# Patient Record
Sex: MALE | Race: WHITE | NOT HISPANIC OR LATINO | Employment: OTHER | URBAN - NONMETROPOLITAN AREA
[De-identification: names, ages, dates, MRNs, and addresses within clinical notes are randomized per-mention and may not be internally consistent; named-entity substitution may affect disease eponyms.]

---

## 2020-02-26 ENCOUNTER — CONSULT (OUTPATIENT)
Dept: ONCOLOGY | Facility: CLINIC | Age: 65
End: 2020-02-26

## 2020-02-26 ENCOUNTER — LAB (OUTPATIENT)
Dept: ONCOLOGY | Facility: HOSPITAL | Age: 65
End: 2020-02-26

## 2020-02-26 VITALS
SYSTOLIC BLOOD PRESSURE: 190 MMHG | TEMPERATURE: 98.8 F | HEIGHT: 72 IN | BODY MASS INDEX: 26.84 KG/M2 | HEART RATE: 105 BPM | WEIGHT: 198.2 LBS | RESPIRATION RATE: 18 BRPM | DIASTOLIC BLOOD PRESSURE: 88 MMHG

## 2020-02-26 DIAGNOSIS — D72.829 LEUKOCYTOSIS, UNSPECIFIED TYPE: ICD-10-CM

## 2020-02-26 DIAGNOSIS — D75.839 THROMBOCYTOSIS: ICD-10-CM

## 2020-02-26 DIAGNOSIS — R76.8 HEPATITIS C ANTIBODY POSITIVE IN BLOOD: ICD-10-CM

## 2020-02-26 DIAGNOSIS — R53.83 OTHER FATIGUE: ICD-10-CM

## 2020-02-26 DIAGNOSIS — D72.828 OTHER ELEVATED WHITE BLOOD CELL (WBC) COUNT: Primary | ICD-10-CM

## 2020-02-26 LAB
ANISOCYTOSIS BLD QL: ABNORMAL
BASOPHILS # BLD MANUAL: 0.18 10*3/MM3 (ref 0–0.2)
BASOPHILS NFR BLD AUTO: 1 % (ref 0–1.5)
CRP SERPL-MCNC: <0.03 MG/DL (ref 0–0.5)
DEPRECATED RDW RBC AUTO: 91.6 FL (ref 37–54)
EOSINOPHIL # BLD MANUAL: 0.53 10*3/MM3 (ref 0–0.4)
EOSINOPHIL NFR BLD MANUAL: 3 % (ref 0.3–6.2)
ERYTHROCYTE [DISTWIDTH] IN BLOOD BY AUTOMATED COUNT: 29.9 % (ref 12.3–15.4)
ERYTHROCYTE [SEDIMENTATION RATE] IN BLOOD: 3 MM/HR (ref 0–15)
FERRITIN SERPL-MCNC: 776 NG/ML (ref 30–400)
HBV SURFACE AB SER RIA-ACNC: NORMAL
HBV SURFACE AG SERPL QL IA: NORMAL
HCT VFR BLD AUTO: 39.7 % (ref 37.5–51)
HCV AB SER DONR QL: REACTIVE
HGB BLD-MCNC: 13.2 G/DL (ref 13–17.7)
HYPOCHROMIA BLD QL: ABNORMAL
IRON 24H UR-MRATE: 84 MCG/DL (ref 59–158)
IRON SATN MFR SERPL: 22 % (ref 20–50)
LDH SERPL-CCNC: 349 U/L (ref 135–225)
LYMPHOCYTES # BLD MANUAL: 1.07 10*3/MM3 (ref 0.7–3.1)
LYMPHOCYTES NFR BLD MANUAL: 2 % (ref 5–12)
LYMPHOCYTES NFR BLD MANUAL: 6 % (ref 19.6–45.3)
MCH RBC QN AUTO: 28.3 PG (ref 26.6–33)
MCHC RBC AUTO-ENTMCNC: 33.2 G/DL (ref 31.5–35.7)
MCV RBC AUTO: 85 FL (ref 79–97)
MONOCYTES # BLD AUTO: 0.36 10*3/MM3 (ref 0.1–0.9)
NEUTROPHILS # BLD AUTO: 15.29 10*3/MM3 (ref 1.7–7)
NEUTROPHILS NFR BLD MANUAL: 81 % (ref 42.7–76)
NEUTS BAND NFR BLD MANUAL: 5 % (ref 0–5)
OVALOCYTES BLD QL SMEAR: ABNORMAL
PLATELET # BLD AUTO: 1547 10*3/MM3 (ref 140–450)
PMV BLD AUTO: 9.8 FL (ref 6–12)
RBC # BLD AUTO: 4.67 10*6/MM3 (ref 4.14–5.8)
SMALL PLATELETS BLD QL SMEAR: ABNORMAL
TARGETS BLD QL SMEAR: ABNORMAL
TIBC SERPL-MCNC: 375 MCG/DL (ref 298–536)
TRANSFERRIN SERPL-MCNC: 252 MG/DL (ref 200–360)
VARIANT LYMPHS NFR BLD MANUAL: 2 % (ref 0–5)
WBC MORPH BLD: NORMAL
WBC NRBC COR # BLD: 17.78 10*3/MM3 (ref 3.4–10.8)

## 2020-02-26 PROCEDURE — 99204 OFFICE O/P NEW MOD 45 MIN: CPT | Performed by: INTERNAL MEDICINE

## 2020-02-26 PROCEDURE — 83615 LACTATE (LD) (LDH) ENZYME: CPT | Performed by: INTERNAL MEDICINE

## 2020-02-26 PROCEDURE — 84466 ASSAY OF TRANSFERRIN: CPT | Performed by: INTERNAL MEDICINE

## 2020-02-26 PROCEDURE — G0463 HOSPITAL OUTPT CLINIC VISIT: HCPCS | Performed by: INTERNAL MEDICINE

## 2020-02-26 PROCEDURE — 86706 HEP B SURFACE ANTIBODY: CPT | Performed by: INTERNAL MEDICINE

## 2020-02-26 PROCEDURE — 86803 HEPATITIS C AB TEST: CPT | Performed by: INTERNAL MEDICINE

## 2020-02-26 PROCEDURE — 86704 HEP B CORE ANTIBODY TOTAL: CPT | Performed by: INTERNAL MEDICINE

## 2020-02-26 PROCEDURE — 83540 ASSAY OF IRON: CPT | Performed by: INTERNAL MEDICINE

## 2020-02-26 PROCEDURE — 85651 RBC SED RATE NONAUTOMATED: CPT | Performed by: INTERNAL MEDICINE

## 2020-02-26 PROCEDURE — 85007 BL SMEAR W/DIFF WBC COUNT: CPT | Performed by: INTERNAL MEDICINE

## 2020-02-26 PROCEDURE — 85025 COMPLETE CBC W/AUTO DIFF WBC: CPT | Performed by: INTERNAL MEDICINE

## 2020-02-26 PROCEDURE — 87340 HEPATITIS B SURFACE AG IA: CPT | Performed by: INTERNAL MEDICINE

## 2020-02-26 PROCEDURE — 86140 C-REACTIVE PROTEIN: CPT | Performed by: INTERNAL MEDICINE

## 2020-02-26 PROCEDURE — G9903 PT SCRN TBCO ID AS NON USER: HCPCS | Performed by: INTERNAL MEDICINE

## 2020-02-26 PROCEDURE — 1123F ACP DISCUSS/DSCN MKR DOCD: CPT | Performed by: INTERNAL MEDICINE

## 2020-02-26 PROCEDURE — 82728 ASSAY OF FERRITIN: CPT | Performed by: INTERNAL MEDICINE

## 2020-02-26 PROCEDURE — G8731 PAIN NEG NO PLAN: HCPCS | Performed by: INTERNAL MEDICINE

## 2020-02-26 PROCEDURE — 87522 HEPATITIS C REVRS TRNSCRPJ: CPT | Performed by: INTERNAL MEDICINE

## 2020-02-26 RX ORDER — DOCUSATE SODIUM 100 MG/1
100 CAPSULE, LIQUID FILLED ORAL 2 TIMES DAILY PRN
COMMUNITY

## 2020-02-26 NOTE — PROGRESS NOTES
DATE OF CONSULT: 2/26/2020      REQUESTING SOURCE:  Keshav St MD       REASON FOR CONSULTATION: Leukocytosis, thrombocytosis, fatigue, hepatitis C antibody positivity      HISTORY OF PRESENT ILLNESS:    64-year-old male with history of hepatitis probably hepatitis B diagnosed around 2000 after colonoscopy, recurrent sinus infection, recent diagnosis of high blood pressure was seen in urgent care center on February 21, 2020 with complaint of fatigue and not feeling well.  Patient was diagnosed with sinusitis and received intravenous antibiotic with Rocephin.  CBC done on February 21, 2020 showed elevated blood cell count of 20,000 with platelet count of  1292,000.  She had a repeat CBC done on February 24, 2020 that showed persistent thrombocytosis with platelet count of 1317,000 and leukocytosis with white blood cell count of 18,000.  Patient has been referred to [facility] [place] for further evaluation and recommendation regarding abnormal CBC.  Patient states he is having on and off night sweats for last 1 year.  Complains of swelling in hand and feet intermittently.  Denies any association of swelling with hot water.  Complains of skin rash affecting back.  Complains of hives affecting bilateral upper extremity which was helped with Benadryl intermittently.  Denies any major weight loss.  Denies any fevers.  Denies any early satiety.        PAST MEDICAL HISTORY:    Past Medical History:   Diagnosis Date   • Hepatitis B    • Hypertension    • Sinus infection        PAST SURGICAL HISTORY:  Past Surgical History:   Procedure Laterality Date   • COLONOSCOPY     • HEMORRHOID BANDING         ALLERGIES:    Allergies   Allergen Reactions   • Other Rash     Pt was working with walnut wood and broke out in a rash   • Tetracycline Rash       SOCIAL HISTORY:   Social History     Tobacco Use   • Smoking status: Former Smoker   • Smokeless tobacco: Never Used   • Tobacco comment: quit in early 21 years old    Substance Use Topics   • Alcohol use: Yes     Alcohol/week: 1.0 standard drinks     Types: 1 Cans of beer per week     Comment: occasionally   • Drug use: Never       CURRENT MEDICATIONS:    Current Outpatient Medications   Medication Sig Dispense Refill   • docusate sodium (COLACE) 100 MG capsule Take 100 mg by mouth 2 (Two) Times a Day.     • NON FORMULARY Daily. kiolic garlic     • NON FORMULARY Daily. reconostat       No current facility-administered medications for this visit.         HOME MEDICATIONS:   Current Outpatient Medications on File Prior to Visit   Medication Sig Dispense Refill   • docusate sodium (COLACE) 100 MG capsule Take 100 mg by mouth 2 (Two) Times a Day.     • NON FORMULARY Daily. kiolic garlic     • NON FORMULARY Daily. reconostat       No current facility-administered medications on file prior to visit.        FAMILY HISTORY:    Family History   Problem Relation Age of Onset   • Diabetes Father    • Cirrhosis Father    • Cancer Sister    • Cancer Maternal Uncle        REVIEW OF SYSTEMS:      CONSTITUTIONAL:  Complains of fatigue.  Complains of swelling of hand but denies any associated with hot water.  Planes of night sweats intermittently that has started 1 year ago.  Denies any fever, chills or weight loss.     EYES: No visual disturbances. No discharge. No new lesions    ENMT:  No epistaxis, mouth sores or difficulty swallowing.    RESPIRATORY:  No new shortness of breath. No new cough or hemoptysis.    CARDIOVASCULAR:  No chest pain or palpitations.    GASTROINTESTINAL:  No abdominal pain nausea, vomiting or blood in the stool.    GENITOURINARY: No Dysuria or Hematuria.    MUSCULOSKELETAL: Complains of arthritis pain affecting elbow and right shoulder.    LYMPHATICS:  Denies any abnormal swollen glands anywhere in the body.    NEUROLOGICAL : No tingling or numbness. No headache or dizziness. No seizures or balance problems.    SKIN: Complains of skin lesion affecting  "back.    ENDOCRINE : No new hot or cold intolerance. No new polyuria . No polydipsia.        PHYSICAL EXAMINATION:      VITAL SIGNS:  BP (!) 190/88 Comment: manually  Pulse 105   Temp 98.8 °F (37.1 °C)   Resp 18   Ht 182.9 cm (72\")   Wt 89.9 kg (198 lb 3.2 oz)   BMI 26.88 kg/m²       02/26/20  0847   Weight: 89.9 kg (198 lb 3.2 oz)       ECOG performance status: 2    CONSTITUTIONAL:  Not in any distress.  Appears anxious.    EYES: Mild conjunctival Pallor. No Icterus. No Pterygium. Extraocular Movements intact.No ptosis.    ENMT:  Normocephalic, Atraumatic.No Facial Asymmetry noted.    NECK:  No adenopathy.Trachea midline. NO JVD.    RESPIRATORY:  Fair air entry bilateral. No rhonchi or wheezing.Fair respiratory effort.    CARDIOVASCULAR:  S1, S2. Regular rate and rhythm. No murmur or gallop appreciated.    ABDOMEN:  Soft, obese, nontender. Bowel sounds present in all four quadrants.  No Hepatosplenomegaly appreciated.    MUSCULOSKELETAL:  No edema.No Calf Tenderness.Decreased range of motion.    NEUROLOGIC:    No  Motor or sensory deficit appreciated. Cranial Nerves 2-12 grossly intact.    SKIN : Skin area measuring about 4 x 3 cm present in lower right back which appears to be dry skin without any evidence of excessive erythema or discharge. Skin is warm and dry to touch.    LYMPHATICS: No new enlarged lymph nodes in neck or supraclavicular area.    PSYCHIATRY: Alert, awake and oriented ×3.  Appears anxious.  Normal judgment.  Makes good eye contact.          DIAGNOSTIC DATA:    No results found for: WBC, RBC, HGB, HCT, MCV, MCH, MCHC, RDW, RDWSD, MPV, PLT, NEUTRORELPCT, LYMPHORELPCT, MONORELPCT, EOSRELPCT, BASORELPCT, AUTOIGPER, NEUTROABS, LYMPHSABS, MONOSABS, EOSABS, BASOSABS, AUTOIGNUM, NRBC  No results found for: GLUCOSE, NA, K, CO2, CL, ANIONGAP, CREATININE, BUN, BCR, CALCIUM, EGFRIFNONA, ALKPHOS, PROTEINTOT, ALT, AST, BILITOT, ALBUMIN, GLOB, LABIL2  No results found for: IRON, TIBC, LABIRON, FERRITIN, " [id number], FOLATE  No results found for: , LABCA2, AFPTM, HCGQUANT, , CHROMGRNA, [id number], CEA, REFLABREPO]      Care Everywhere Result Report  CBC With DifferentialResulted: 2/21/2020 7:56 PM  [facility]  Component Name Value Ref Range   White Blood Cells 20.4 (H) 4.2 - 10.2    Red Blood Cells 4.58 4.1 - 5.7    Hemoglobin 13.1 12.9 - 17.4 g/dL   Hematocrit 40.3 37.6 - 49.5 %   MCV 88.0 80 - 97 fL   MCH 28.6 27 - 33.2 pg   MCHC 32.5 (L) 32.7 - 35.6 g/dL   RDW 30.7 (H) 12.7 - 15.7 %   Platelet Count 1,292 (HH)   Comment: CONFIRMED BY REPEAT GAVE TO MARCIE @ 1940 BY  - 375    Percent Neutrophils 72.7 (H) 51.2 - 72 %   Percent Lymphs 14.7 (L) 21.7 - 43.3 %   Percent Monocytes 5.5 0 - 12 %   Percent Eosinophils 2.4 0 - 5 %   Percent basophil 3.4 (H) 0 - 2 %   IG% - mch 1.3 (H) 0 - 0.2 %   Absolute Neutrophils 14.8 (H) 2 - 7.8    Absolute Lymphocytes 3.0 0.9 - 4.4    Absolute Monocytes 1.1 (H) 0 - 1    Absolute Eosiniphils 0.5 0 - 0.5    Absolute Basophils 0.7 (H) 0 - 0.2    ABS IG 0.27 (H) 0 - 0.2 10*9/L   Segmented Neutrophils 73 (H) 36 - 66 %   Lymphocytes 10 (L) 24 - 44 %   Monocytes 6 2 - 10 %   Eosinophils 2 2 - 4 %   Basophils 2 0 - 2 %   Atypical Lymphocytes 7 0 - 10 %   Nucleated Red Blood Cells 3     RBC MORPH. NORM     Platelet Estimate MOD   Comment:  INCREASED  W/ LARGE AND GIANT PLTS     Specimen Collected on   Whole Blood - Whole blood sample (specimen) 2/21/2020 7:28 PM           Radiology Data :  Chest x-ray PA/lateral done at [facility] on February 22, 2020 showed:  Weakness .    PA and lateral chest: The bones are intact . There is a granuloma in the upper right midlung field . No infiltrate or mass . The heart is upper limits of normal . No heart failure or pleural fluid . No hilar or mediastinal adenopathy          ASSESSMENT AND PLAN:      1.  Thrombocytosis:  - Patient is found to have thrombocytosis with platelet count of 1292,000 on February 21, 2020.  Repeat CBC done  on February 24, 2020 shows platelet count is 1318,000  -Patient did have a sinus infection at that point without any fevers.  -Differential diagnosis for extreme thrombocytosis includes primary bone marrow pathology like myeloproliferative neoplasm or leukemia/lymphoma or CML versus reactive causes like infection or severe iron deficiency.  - We will do blood work today consisting of CBC with differential, iron studies, ferritin, ESR, C-reactive protein, LDH, Kye 2 mutation including reflex to exon 12-15, DEEDEE R and MPL, peripheral blood flow cytometry as well as BCR/ABL.  -We will get an ultrasound of abdomen to look at liver and spleen size and architecture.  - We will ask patient to return to clinic in 1 week after blood work and ultrasound to go over the results and further recommendation.  -Recommend starting aspirin 81 mg p.o. daily to prevent any thrombotic complication in the interim.    2.  Leukocytosis:  - Patient was found to have leukocytosis with white blood cell count staying between 18,000 and 20,000 on February 21, 2020 and February 24, 2020.  -Differential is predominantly neutrophil.  - Again differential remains reactive like infection versus bone marrow pathology.  -We will follow-up on result of above-mentioned blood work.    3.  Fatigue and night sweats:  -Worrisome for underlying bone marrow pathology.    4.  Hypertension:  -Patient's blood pressure is elevated at 190/88.  Patient is very anxious and nervous about appointment today.  Recommend rechecking blood pressure later today and if it remains persistently high to be started on any antihypertensive medication.  Recommend following up with primary medical provider.    5.  Hepatitis C antibody positivity:  -Patient had questionable history of hepatitis B in early 2000.  Blood work done today for hepatitis profile shows positivity for hepatitis C antibody.  -We will send out hepatitis C quantitative RNA PCR for further evaluation of hep C  antibody being positive.    6.  Health maintenance: Patient does not smoke.  Had a colonoscopy around 2001 at [place].    7. Advance Care Planning: For now patient remains full code and is able to make decisions.  Patient has health care surrogate mentioned on chart.      PHQ-9 Total Score:         Marv Messina reports a pain score of 0.  Given his pain assessment as noted, treatment options were discussed and the following options were decided upon as a follow-up plan to address the patient's pain: No intervention required.             Thank you for this consultation.    Vadim Song MD  2/26/2020  10:21 AM          EMR Dragon/Transcription disclaimer:   Much of this encounter note is an electronic transcription/translation of spoken language to printed text. The electronic translation of spoken language may permit erroneous, or at times, nonsensical words or phrases to be inadvertently transcribed; Although I have reviewed the note for such errors, some may still exist.

## 2020-02-27 LAB — HBV CORE AB SER DONR QL IA: NEGATIVE

## 2020-02-28 LAB
HCV RNA SERPL NAA+PROBE-ACNC: NORMAL IU/ML
TEST INFORMATION: NORMAL

## 2020-03-02 LAB
REF LAB TEST METHOD: NORMAL
WHOLE BLOOD SORT: NORMAL

## 2020-03-03 ENCOUNTER — HOSPITAL ENCOUNTER (OUTPATIENT)
Dept: ULTRASOUND IMAGING | Facility: HOSPITAL | Age: 65
Discharge: HOME | End: 2020-03-03
Admitting: INTERNAL MEDICINE

## 2020-03-03 DIAGNOSIS — D72.828 OTHER ELEVATED WHITE BLOOD CELL (WBC) COUNT: ICD-10-CM

## 2020-03-03 DIAGNOSIS — D75.839 THROMBOCYTOSIS: ICD-10-CM

## 2020-03-03 PROCEDURE — 76700 US EXAM ABDOM COMPLETE: CPT

## 2020-03-06 ENCOUNTER — OFFICE VISIT (OUTPATIENT)
Dept: ONCOLOGY | Facility: CLINIC | Age: 65
End: 2020-03-06

## 2020-03-06 VITALS
OXYGEN SATURATION: 97 % | SYSTOLIC BLOOD PRESSURE: 149 MMHG | WEIGHT: 197.2 LBS | HEART RATE: 96 BPM | DIASTOLIC BLOOD PRESSURE: 89 MMHG | BODY MASS INDEX: 26.75 KG/M2 | TEMPERATURE: 98.5 F

## 2020-03-06 DIAGNOSIS — D72.829 LEUKOCYTOSIS, UNSPECIFIED TYPE: ICD-10-CM

## 2020-03-06 DIAGNOSIS — R76.8 HEPATITIS C ANTIBODY POSITIVE IN BLOOD: ICD-10-CM

## 2020-03-06 DIAGNOSIS — D75.839 THROMBOCYTOSIS: Primary | ICD-10-CM

## 2020-03-06 DIAGNOSIS — D47.1 MPN (MYELOPROLIFERATIVE NEOPLASM) (HCC): ICD-10-CM

## 2020-03-06 PROCEDURE — G0463 HOSPITAL OUTPT CLINIC VISIT: HCPCS | Performed by: INTERNAL MEDICINE

## 2020-03-06 PROCEDURE — G8731 PAIN NEG NO PLAN: HCPCS | Performed by: INTERNAL MEDICINE

## 2020-03-06 PROCEDURE — G9903 PT SCRN TBCO ID AS NON USER: HCPCS | Performed by: INTERNAL MEDICINE

## 2020-03-06 PROCEDURE — 99214 OFFICE O/P EST MOD 30 MIN: CPT | Performed by: INTERNAL MEDICINE

## 2020-03-06 PROCEDURE — 1123F ACP DISCUSS/DSCN MKR DOCD: CPT | Performed by: INTERNAL MEDICINE

## 2020-03-06 NOTE — PROGRESS NOTES
DATE OF VISIT: 3/6/2020      REASON FOR VISIT: Kye 2+ myeloproliferative neoplasm, hep C antibody positive, thrombocytosis, leukocytosis      HISTORY OF PRESENT ILLNESS:    64-year-old male with history of hepatitis probably hepatitis B diagnosed around 2000 after colonoscopy, recurrent sinus infection, recent diagnosis of high blood pressure was initially seen in consultation on February 26, 2020 for evaluation of abnormal CBC showing leukocytosis and thrombocytosis.  Patient had a blood work done as well as ultrasound of abdomen.  Patient is here to discuss the results and further recommendation.  States he feels better.  Denies any bleeding.  Denies any new lymph node enlargement.    PAST MEDICAL HISTORY:    Past Medical History:   Diagnosis Date   • Hepatitis B    • Hypertension    • Sinus infection        SOCIAL HISTORY:    Social History     Tobacco Use   • Smoking status: Former Smoker   • Smokeless tobacco: Never Used   • Tobacco comment: quit in early 21 years old   Substance Use Topics   • Alcohol use: Yes     Alcohol/week: 1.0 standard drinks     Types: 1 Cans of beer per week     Comment: occasionally   • Drug use: Never       Surgical History :  Past Surgical History:   Procedure Laterality Date   • COLONOSCOPY     • HEMORRHOID BANDING         ALLERGIES:    Allergies   Allergen Reactions   • Other Rash     Pt was working with walnut wood and broke out in a rash   • Tetracycline Rash         FAMILY HISTORY:  Family History   Problem Relation Age of Onset   • Diabetes Father    • Cirrhosis Father    • Cancer Sister    • Cancer Maternal Uncle            REVIEW OF SYSTEMS:      CONSTITUTIONAL:  Complains of fatigue.  Complains of swelling of hand but denies any associated with hot water.  Complains of night sweats intermittently that has started 1 year ago.  Denies any fever, chills or weight loss.      EYES: No visual disturbances. No discharge. No new lesions     ENMT:  No epistaxis, mouth sores or  difficulty swallowing.     RESPIRATORY:  No new shortness of breath. No new cough or hemoptysis.     CARDIOVASCULAR:  No chest pain or palpitations.     GASTROINTESTINAL:  No abdominal pain nausea, vomiting or blood in the stool.     GENITOURINARY: No Dysuria or Hematuria.     MUSCULOSKELETAL: Complains of arthritis pain affecting elbow and right shoulder.     LYMPHATICS:  Denies any abnormal swollen glands anywhere in the body.     NEUROLOGICAL : No tingling or numbness. No headache or dizziness. No seizures or balance problems.     SKIN: Complains of skin lesion affecting back.     ENDOCRINE : No new hot or cold intolerance. No new polyuria . No polydipsia.          PHYSICAL EXAMINATION:      VITAL SIGNS:  /89   Pulse 96   Temp 98.5 °F (36.9 °C) (Temporal)   Wt 89.4 kg (197 lb 3.2 oz)   SpO2 97%   BMI 26.75 kg/m²       03/06/20  1122   Weight: 89.4 kg (197 lb 3.2 oz)           ECOG performance status: 2     CONSTITUTIONAL:  Not in any distress.  Appears anxious.     EYES: Mild conjunctival Pallor. No Icterus. No Pterygium. Extraocular Movements intact.No ptosis.     ENMT:  Normocephalic, Atraumatic.No Facial Asymmetry noted.     NECK:  No adenopathy.Trachea midline. NO JVD.     RESPIRATORY:  Fair air entry bilateral. No rhonchi or wheezing.Fair respiratory effort.     CARDIOVASCULAR:  S1, S2. Regular rate and rhythm. No murmur or gallop appreciated.     ABDOMEN:  Soft, obese, nontender. Bowel sounds present in all four quadrants.  No Hepatosplenomegaly appreciated.     MUSCULOSKELETAL:  No edema.No Calf Tenderness.Decreased range of motion.     NEUROLOGIC:    No  Motor or sensory deficit appreciated. Cranial Nerves 2-12 grossly intact.     SKIN : Skin area measuring about 4 x 3 cm present in lower right back which appears to be dry skin without any evidence of excessive erythema or discharge. Skin is warm and dry to touch.     LYMPHATICS: No new enlarged lymph nodes in neck or supraclavicular  area.     PSYCHIATRY: Alert, awake and oriented ×3.  Appears anxious.  Normal judgment.  Makes good eye contact.             DIAGNOSTIC DATA:    No results found for: GLUCOSE, NA, K, CO2, CL, ANIONGAP, CREATININE, BUN, BCR, CALCIUM, EGFRIFNONA, ALKPHOS, PROTEINTOT, ALT, AST, BILITOT, ALBUMIN, GLOB, LABIL2  Lab Results   Component Value Date    WBC 17.78 (H) 02/26/2020    HGB 13.2 02/26/2020    HCT 39.7 02/26/2020    MCV 85.0 02/26/2020    PLT 1,547 (C) 02/26/2020     Lab Results   Component Value Date    NEUTROABS 15.29 (H) 02/26/2020    IRON 84 02/26/2020    TIBC 375 02/26/2020    LABIRON 22 02/26/2020    FERRITIN 776.00 (H) 02/26/2020     No results found for: , LABCA2, AFPTM, HCGQUANT, , CHROMGRNA, [id number], CEA, REFLABREPO        Hepatitis B testing was negative on February 26, 2020.     Hepatitis C Antibody   Specimen Information: Blood        Component  Ref Range & Units 10d ago   Hepatitis C Ab  Non-Reactive ReactiveAbnormal     Resulting Agency [facility]      Narrative   Performed by: [facility]   Results may be falsely decreased if patient taking Biotin.      Specimen Collected: 02/26/20 10:01 Last Resulted: 02/26/20 11:05               Hepatitis C RNA, Quantitative, PCR (graph)   Specimen Information: Blood        Component  Ref Range & Units 10d ago   Hepatitis C Quantitation  IU/mL HCV Not Detected    Test Information Comment    Comment: The quantitative range of this assay is 15 IU/mL to 100 million IU/mL.   Resulting Agency [facility]      Narrative   Performed by: [facility]   Performed at:  [facility]  [id number]  : Arik Mo MD, Phone:  [phone]      Specimen Collected: 02/26/20 09:57 Last Resulted: 02/28/20 10:08                 Kye 2 testing done on February 26, 2020 showed:            Peripheral blood flow cytometry done on February 26, 2020 showed:                BCR/ABL on peripheral blood flow cytometry done on February  26, 2020 showed:              RADIOLOGY DATA :  Us Abdomen Complete    Result Date: 3/3/2020  CONCLUSION: Mild hepatosplenomegaly. Nonspecific mild heterogeneity of the liver. Multiple tiny gallbladder polyps. Electronically signed by:  Sanjiv Freeman MD  3/3/2020 2:02 PM [facility] Workstation: [id number]          ASSESSMENT AND PLAN:      1.  Leukocytosis and thrombocytosis due to myeloproliferative neoplasm:  - CBC done on February 26, 2020 showed white blood cell count is elevated 17,000 with platelet count of 1547,000.   -Work-up showed Kye 2 mutation was positive consistent with myeloproliferative neoplasm.  - BCR/ABL and peripheral blood flow cytometry is negative.  - Ultrasound of abdomen done on March 3, 2020 shows mild hepatosplenomegaly.  -Result of blood work and ultrasound of abdomen were discussed with patient and his wife.  - Due to Kye 2 mutation being positive, recommend doing bone marrow biopsy to differentiate between polycythemia vera, essential thrombocytosis and myelofibrosis.  - It was also discussed with patient due to extreme thrombocytosis, he will be at high risk of thrombotic complication.  - Patient was recommended to have a bone marrow biopsy done next week.  - Patient and his family wants to get a second opinion from [place] before doing any procedure or starting chemo pill consisting of hydroxyurea.  - Referral for [place] clinic has been placed today on March 6, 2020.  -We will ask patient to return to clinic in 4 weeks after his appointment at [place] for further treatment recommendation.  -In the interim recommend continue with aspirin 81 mg p.o. daily.    2.  Hepatitis C antibody positivity:  - Hepatitis testing done on February 26, 2020 shows hepatitis C antibody positivity in the blood.  -Quantitative RNA for hepatitis C is undetectable.    3.  Hypertension    4.  Health maintenance: Patient does not smoke.  Had a colonoscopy around 2001 at [place].     5. Advance Care Planning:  For now patient remains full code and is able to make decisions.  Patient has health care surrogate mentioned on chart.           PHQ-9 Total Score:       Marv Messina reports a pain score of 0.  Given his pain assessment as noted, treatment options were discussed and the following options were decided upon as a follow-up plan to address the patient's pain: No acute intervention required.         Vadim Song MD  3/6/2020  11:47 AM        EMR Dragon/Transcription disclaimer:   Much of this encounter note is an electronic transcription/translation of spoken language to printed text. The electronic translation of spoken language may permit erroneous, or at times, nonsensical words or phrases to be inadvertently transcribed; Although I have reviewed the note for such errors, some may still exist.

## 2020-04-02 ENCOUNTER — TELEPHONE (OUTPATIENT)
Dept: ONCOLOGY | Facility: HOSPITAL | Age: 65
End: 2020-04-02

## 2020-04-02 NOTE — TELEPHONE ENCOUNTER
----- Message from Vadim Song MD sent at 4/2/2020  8:53 AM CDT -----  Regarding: RE: Pt Question  He needs to come and see me after getting recommendation and starting chemotherapy from [facility].  I have not been contacted by [facility] so far.  Thank you  ----- Message -----  From: Moncho Sanchez RN  Sent: 4/2/2020   8:36 AM CDT  To: Vadim Song MD  Subject: Pt Question                                      Pt wants to know if he can just get blood test done at clinic tomorrow and you give him results over the phone - he does states he does not want to come into hospital.

## 2020-04-03 ENCOUNTER — APPOINTMENT (OUTPATIENT)
Dept: ONCOLOGY | Facility: HOSPITAL | Age: 65
End: 2020-04-03

## 2020-04-03 ENCOUNTER — OFFICE VISIT (OUTPATIENT)
Dept: ONCOLOGY | Facility: CLINIC | Age: 65
End: 2020-04-03

## 2020-04-03 ENCOUNTER — APPOINTMENT (OUTPATIENT)
Dept: ONCOLOGY | Facility: CLINIC | Age: 65
End: 2020-04-03

## 2020-04-03 ENCOUNTER — LAB (OUTPATIENT)
Dept: ONCOLOGY | Facility: HOSPITAL | Age: 65
End: 2020-04-03

## 2020-04-03 VITALS
TEMPERATURE: 98.6 F | BODY MASS INDEX: 27.19 KG/M2 | SYSTOLIC BLOOD PRESSURE: 166 MMHG | HEART RATE: 93 BPM | WEIGHT: 200.7 LBS | DIASTOLIC BLOOD PRESSURE: 86 MMHG | RESPIRATION RATE: 18 BRPM | HEIGHT: 72 IN

## 2020-04-03 DIAGNOSIS — D47.1 MPN (MYELOPROLIFERATIVE NEOPLASM) (HCC): Primary | ICD-10-CM

## 2020-04-03 DIAGNOSIS — D75.839 THROMBOCYTOSIS: Primary | ICD-10-CM

## 2020-04-03 DIAGNOSIS — R76.8 HEPATITIS C ANTIBODY POSITIVE IN BLOOD: ICD-10-CM

## 2020-04-03 DIAGNOSIS — D75.839 THROMBOCYTOSIS: ICD-10-CM

## 2020-04-03 DIAGNOSIS — R53.83 OTHER FATIGUE: ICD-10-CM

## 2020-04-03 DIAGNOSIS — D47.1 MPN (MYELOPROLIFERATIVE NEOPLASM) (HCC): ICD-10-CM

## 2020-04-03 DIAGNOSIS — D72.829 LEUKOCYTOSIS, UNSPECIFIED TYPE: ICD-10-CM

## 2020-04-03 LAB
ALBUMIN SERPL-MCNC: 4.8 G/DL (ref 3.5–5.2)
ALBUMIN/GLOB SERPL: 1.8 G/DL
ALP SERPL-CCNC: 70 U/L (ref 39–117)
ALT SERPL W P-5'-P-CCNC: 30 U/L (ref 1–41)
ANION GAP SERPL CALCULATED.3IONS-SCNC: 12 MMOL/L (ref 5–15)
AST SERPL-CCNC: 27 U/L (ref 1–40)
BASOPHILS # BLD MANUAL: 0.18 10*3/MM3 (ref 0–0.2)
BASOPHILS NFR BLD AUTO: 1 % (ref 0–1.5)
BILIRUB SERPL-MCNC: 0.6 MG/DL (ref 0.2–1.2)
BUN BLD-MCNC: 11 MG/DL (ref 8–23)
BUN/CREAT SERPL: 12.6 (ref 7–25)
CALCIUM SPEC-SCNC: 9.6 MG/DL (ref 8.6–10.5)
CHLORIDE SERPL-SCNC: 104 MMOL/L (ref 98–107)
CO2 SERPL-SCNC: 26 MMOL/L (ref 22–29)
CREAT BLD-MCNC: 0.87 MG/DL (ref 0.76–1.27)
DEPRECATED RDW RBC AUTO: 91.8 FL (ref 37–54)
EOSINOPHIL # BLD MANUAL: 0.18 10*3/MM3 (ref 0–0.4)
EOSINOPHIL NFR BLD MANUAL: 1 % (ref 0.3–6.2)
ERYTHROCYTE [DISTWIDTH] IN BLOOD BY AUTOMATED COUNT: 29.6 % (ref 12.3–15.4)
GFR SERPL CREATININE-BSD FRML MDRD: 89 ML/MIN/1.73
GLOBULIN UR ELPH-MCNC: 2.6 GM/DL
GLUCOSE BLD-MCNC: 106 MG/DL (ref 65–99)
HCT VFR BLD AUTO: 39.8 % (ref 37.5–51)
HGB BLD-MCNC: 13 G/DL (ref 13–17.7)
LDH SERPL-CCNC: 332 U/L (ref 135–225)
LYMPHOCYTES # BLD MANUAL: 3.64 10*3/MM3 (ref 0.7–3.1)
LYMPHOCYTES NFR BLD MANUAL: 20 % (ref 19.6–45.3)
LYMPHOCYTES NFR BLD MANUAL: 9 % (ref 5–12)
MCH RBC QN AUTO: 28.5 PG (ref 26.6–33)
MCHC RBC AUTO-ENTMCNC: 32.7 G/DL (ref 31.5–35.7)
MCV RBC AUTO: 87.3 FL (ref 79–97)
MONOCYTES # BLD AUTO: 1.64 10*3/MM3 (ref 0.1–0.9)
MYELOCYTES NFR BLD MANUAL: 1 % (ref 0–0)
NEUTROPHILS # BLD AUTO: 12.02 10*3/MM3 (ref 1.7–7)
NEUTROPHILS NFR BLD MANUAL: 66 % (ref 42.7–76)
PLATELET # BLD AUTO: 1831 10*3/MM3 (ref 140–450)
PMV BLD AUTO: 9.5 FL (ref 6–12)
POTASSIUM BLD-SCNC: 4.8 MMOL/L (ref 3.5–5.2)
PROT SERPL-MCNC: 7.4 G/DL (ref 6–8.5)
RBC # BLD AUTO: 4.56 10*6/MM3 (ref 4.14–5.8)
RBC MORPH BLD: NORMAL
SMALL PLATELETS BLD QL SMEAR: ABNORMAL
SODIUM BLD-SCNC: 142 MMOL/L (ref 136–145)
VARIANT LYMPHS NFR BLD MANUAL: 2 % (ref 0–5)
WBC MORPH BLD: NORMAL
WBC NRBC COR # BLD: 18.21 10*3/MM3 (ref 3.4–10.8)

## 2020-04-03 PROCEDURE — G0463 HOSPITAL OUTPT CLINIC VISIT: HCPCS | Performed by: INTERNAL MEDICINE

## 2020-04-03 PROCEDURE — 85007 BL SMEAR W/DIFF WBC COUNT: CPT

## 2020-04-03 PROCEDURE — 83615 LACTATE (LD) (LDH) ENZYME: CPT

## 2020-04-03 PROCEDURE — G9903 PT SCRN TBCO ID AS NON USER: HCPCS | Performed by: INTERNAL MEDICINE

## 2020-04-03 PROCEDURE — 99214 OFFICE O/P EST MOD 30 MIN: CPT | Performed by: INTERNAL MEDICINE

## 2020-04-03 PROCEDURE — 1123F ACP DISCUSS/DSCN MKR DOCD: CPT | Performed by: INTERNAL MEDICINE

## 2020-04-03 PROCEDURE — 85025 COMPLETE CBC W/AUTO DIFF WBC: CPT

## 2020-04-03 PROCEDURE — G8731 PAIN NEG NO PLAN: HCPCS | Performed by: INTERNAL MEDICINE

## 2020-04-03 PROCEDURE — 80053 COMPREHEN METABOLIC PANEL: CPT

## 2020-04-03 RX ORDER — HYDROXYUREA 500 MG/1
CAPSULE ORAL
COMMUNITY
Start: 2020-03-23 | End: 2020-04-03 | Stop reason: DRUGHIGH

## 2020-04-03 RX ORDER — HYDROXYUREA 500 MG/1
500 CAPSULE ORAL 2 TIMES DAILY
Qty: 60 CAPSULE | Refills: 1 | Status: SHIPPED | OUTPATIENT
Start: 2020-04-03 | End: 2020-06-02 | Stop reason: SDUPTHER

## 2020-04-03 NOTE — PROGRESS NOTES
DATE OF VISIT: 4/3/2020      REASON FOR VISIT: Kye 2+ myeloproliferative neoplasm, hep C antibody positive, thrombocytosis, leukocytosis      HISTORY OF PRESENT ILLNESS:    64-year-old male with history of hepatitis probably hepatitis B diagnosed around 2000 after colonoscopy, recurrent sinus infection, recent diagnosis of high blood pressure was initially seen in consultation on February 26, 2020 for evaluation of abnormal CBC showing leukocytosis and thrombocytosis.  Patient was last seen here at clinic on March 6, 2020 and upon patient request he was referred to [facility].  Patient underwent bone marrow biopsy on March 23, 2020 at [facility] and was subsequently started on hydroxyurea 500 mg p.o. daily on March 28, 2020.  Patient is here to discuss further recommendation.  Denies any nausea or vomiting or diarrhea since starting hydroxyurea.  Denies any bleeding.  Denies any new lymph node enlargement.      PAST MEDICAL HISTORY:    Past Medical History:   Diagnosis Date   • Hepatitis B    • Hypertension    • Sinus infection        SOCIAL HISTORY:    Social History     Tobacco Use   • Smoking status: Former Smoker   • Smokeless tobacco: Never Used   • Tobacco comment: quit in early 21 years old   Substance Use Topics   • Alcohol use: Yes     Alcohol/week: 1.0 standard drinks     Types: 1 Cans of beer per week     Comment: occasionally   • Drug use: Never       Surgical History :  Past Surgical History:   Procedure Laterality Date   • COLONOSCOPY     • HEMORRHOID BANDING         ALLERGIES:    Allergies   Allergen Reactions   • Other Rash     Pt was working with walnut wood and broke out in a rash   • Tetracycline Rash         FAMILY HISTORY:  Family History   Problem Relation Age of Onset   • Diabetes Father    • Cirrhosis Father    • Cancer Sister    • Cancer Maternal Uncle            REVIEW OF SYSTEMS:      CONSTITUTIONAL:  Complains of fatigue.   Complains of night sweats intermittently that has  "started 1 year ago.    Has gained 3 pounds since last clinic visit.  Denies any fever, chills or weight loss.      EYES: No visual disturbances. No discharge. No new lesions     ENMT:  No epistaxis, mouth sores or difficulty swallowing.     RESPIRATORY:  No new shortness of breath. No new cough or hemoptysis.     CARDIOVASCULAR:  No chest pain or palpitations.     GASTROINTESTINAL:  No abdominal pain nausea, vomiting or blood in the stool.     GENITOURINARY: No Dysuria or Hematuria.     MUSCULOSKELETAL: Complains of arthritis pain affecting elbow and right shoulder.     LYMPHATICS:  Denies any abnormal swollen glands anywhere in the body.     NEUROLOGICAL : No tingling or numbness. No headache or dizziness. No seizures or balance problems.     SKIN: Complains of skin lesion affecting back.     ENDOCRINE : No new hot or cold intolerance. No new polyuria . No polydipsia.          PHYSICAL EXAMINATION:      VITAL SIGNS:  /86   Pulse 93   Temp 98.6 °F (37 °C) (Temporal)   Resp 18   Ht 182.9 cm (72.01\")   Wt 91 kg (200 lb 11.2 oz)   BMI 27.21 kg/m²       04/03/20  1045   Weight: 91 kg (200 lb 11.2 oz)           ECOG performance status: 2     CONSTITUTIONAL:  Not in any distress.  Appears anxious.     EYES: Mild conjunctival Pallor. No Icterus. No Pterygium. Extraocular Movements intact.No ptosis.     ENMT:  Normocephalic, Atraumatic.No Facial Asymmetry noted.     NECK:  No adenopathy.Trachea midline. NO JVD.     RESPIRATORY:  Fair air entry bilateral. No rhonchi or wheezing.Fair respiratory effort.     CARDIOVASCULAR:  S1, S2. Regular rate and rhythm. No murmur or gallop appreciated.     ABDOMEN:  Soft, obese, nontender. Bowel sounds present in all four quadrants.  No Hepatosplenomegaly appreciated.     MUSCULOSKELETAL:  No edema.No Calf Tenderness.Decreased range of motion.     NEUROLOGIC:    No  Motor or sensory deficit appreciated. Cranial Nerves 2-12 grossly intact.     SKIN : Skin area measuring about 4 " x 3 cm present in lower right back which appears to be dry skin without any evidence of excessive erythema or discharge. Skin is warm and dry to touch.     LYMPHATICS: No new enlarged lymph nodes in neck or supraclavicular area.     PSYCHIATRY: Alert, awake and oriented ×3.  Appears anxious.  Normal judgment.  Makes good eye contact.             DIAGNOSTIC DATA:    Glucose   Date Value Ref Range Status   04/03/2020 106 (H) 65 - 99 mg/dL Final     Sodium   Date Value Ref Range Status   04/03/2020 142 136 - 145 mmol/L Final     Potassium   Date Value Ref Range Status   04/03/2020 4.8 3.5 - 5.2 mmol/L Final     CO2   Date Value Ref Range Status   04/03/2020 26.0 22.0 - 29.0 mmol/L Final     Chloride   Date Value Ref Range Status   04/03/2020 104 98 - 107 mmol/L Final     Anion Gap   Date Value Ref Range Status   04/03/2020 12.0 5.0 - 15.0 mmol/L Final     Creatinine   Date Value Ref Range Status   04/03/2020 0.87 0.76 - 1.27 mg/dL Final     BUN   Date Value Ref Range Status   04/03/2020 11 8 - 23 mg/dL Final     BUN/Creatinine Ratio   Date Value Ref Range Status   04/03/2020 12.6 7.0 - 25.0 Final     Calcium   Date Value Ref Range Status   04/03/2020 9.6 8.6 - 10.5 mg/dL Final     eGFR Non  Amer   Date Value Ref Range Status   04/03/2020 89 >60 mL/min/1.73 Final     Alkaline Phosphatase   Date Value Ref Range Status   04/03/2020 70 39 - 117 U/L Final     Total Protein   Date Value Ref Range Status   04/03/2020 7.4 6.0 - 8.5 g/dL Final     ALT (SGPT)   Date Value Ref Range Status   04/03/2020 30 1 - 41 U/L Final     AST (SGOT)   Date Value Ref Range Status   04/03/2020 27 1 - 40 U/L Final     Total Bilirubin   Date Value Ref Range Status   04/03/2020 0.6 0.2 - 1.2 mg/dL Final     Albumin   Date Value Ref Range Status   04/03/2020 4.80 3.50 - 5.20 g/dL Final     Globulin   Date Value Ref Range Status   04/03/2020 2.6 gm/dL Final     Lab Results   Component Value Date    WBC 18.21 (H) 04/03/2020    HGB 13.0  04/03/2020    HCT 39.8 04/03/2020    MCV 87.3 04/03/2020    PLT 1,831 (C) 04/03/2020     Lab Results   Component Value Date    NEUTROABS 15.29 (H) 02/26/2020    IRON 84 02/26/2020    TIBC 375 02/26/2020    LABIRON 22 02/26/2020    FERRITIN 684 (H) 03/23/2020     No results found for: , LABCA2, AFPTM, HCGQUANT, , CHROMGRNA, [id number], CEA, REFLABREPO        Hepatitis B testing was negative on February 26, 2020.     Hepatitis C Antibody   Specimen Information: Blood        Component  Ref Range & Units 10d ago   Hepatitis C Ab  Non-Reactive ReactiveAbnormal     Resulting Agency [facility]      Narrative   Performed by: [facility]   Results may be falsely decreased if patient taking Biotin.      Specimen Collected: 02/26/20 10:01 Last Resulted: 02/26/20 11:05               Hepatitis C RNA, Quantitative, PCR (graph)   Specimen Information: Blood        Component  Ref Range & Units 10d ago   Hepatitis C Quantitation  IU/mL HCV Not Detected    Test Information Comment    Comment: The quantitative range of this assay is 15 IU/mL to 100 million IU/mL.   Resulting Agency [facility]      Narrative   Performed by: [facility]   Performed at:  [facility]  [id number]  : Arik Mo MD, Phone:  [phone]      Specimen Collected: 02/26/20 09:57 Last Resulted: 02/28/20 10:08                 Kye 2 testing done on February 26, 2020 showed:            Peripheral blood flow cytometry done on February 26, 2020 showed:                BCR/ABL on peripheral blood flow cytometry done on February 26, 2020 showed:              RADIOLOGY DATA :  Ultrasound of abdomen done on March 3, 2020 was reviewed, discussed with patient, it showed:    FINDINGS: Realtime grayscale and color-flow imaging is obtained  of the abdomen.     The gallbladder contains multiple tiny echogenic nodular foci in  the walls with the largest measuring approximately 2 mm, probably  representing polyps..No  shadowing stones, pericholecystic fluid  or wall thickening.   The liver is slightly enlarged measuring 17.7 cm. The liver  parenchyma is slightly heterogeneous. This is nonspecific. No  focal lesions or intrahepatic biliary ductal dilatation.   The visualized common duct is within normal limits, measuring 4  mm in diameter.   The visualized kidneys are normal in echogenicity, without  hydronephrosis.  The visualized pancreatic head and body  are within normal  limits, the tail is obscured by bowel gas and cannot be  evaluated.    Spleen is mildly enlarged measuring 15 cm in length.  The aorta and inferior vena cava are grossly unremarkable.        IMPRESSION:  CONCLUSION:  Mild hepatosplenomegaly.  Nonspecific mild heterogeneity of the liver.  Multiple tiny gallbladder polyps.          PATHOLOGY DATA:    Pathology report from March 23, 2020 at [place] showed:    Diagnosis:  BONE MARROW - PERIPHERAL BLOOD SMEAR, ASPIRATE SMEAR, PARTICLE PREPARATION, BIOPSY AND FLOW CYTOMETRY: MYELOID NEOPLASM WITH ABNORMAL MEGAKARYOCYTES AND RING SIDEROBLASTS; NO INCREASE IN BLASTS; SEE IMPRESSION     IMPRESSION: This is a markedly hypercellular marrow (>90%) with hyperplastic trilineage hematopoiesis and increased abnormal megakaryocytes with clustering. There is mild erythroid atypia and increased ring sideroblasts (15-20%) although this number may not be representative given the non-particulate and hemodiluted aspirate. There is no increase in blasts by flow cytometry on the hemodiluted aspirate and by immunohistochemistry performed on the biopsy. The marked peripheral thrombocytosis and mild neutrophilia is noted. There is no peripheral eosinophilia but there is mild increase in marrow eosinophils based on the biopsy sections. Overall, this constellation of findings is consistent with myeloid neoplasm with ring sideroblasts but no increase in myeloblasts. The features raise a possibility of myelodysplastic/myeloproliferative  neoplasm with ring sideroblasts and thrombocytosis (MDS/MPN-RS-T). However the features may also be consistent with one of the specific subtypes of MPN if   the presence of ring sideroblasts can be attributed to a secondary cause such as toxic or certain drug exposures. Additional studies may help further characterize this process therefore final diagnosis depends on correlation with pending additional testing, as documented below, which will be included in the comprehensive hematopathology report.          ASSESSMENT AND PLAN:      1.  Leukocytosis and thrombocytosis due to myeloproliferative neoplasm:  - CBC done on February 26, 2020 showed white blood cell count is elevated 17,000 with platelet count of 1547,000.   -Work-up showed Kye 2 mutation was positive consistent with myeloproliferative neoplasm.  - BCR/ABL and peripheral blood flow cytometry is negative.  - Ultrasound of abdomen done on March 3, 2020 shows mild hepatosplenomegaly.  -Result of blood work and ultrasound of abdomen were discussed with patient and his wife.  - Due to Kye 2 mutation being positive, recommend doing bone marrow biopsy to differentiate between polycythemia vera, essential thrombocytosis and myelofibrosis.  - It was also discussed with patient due to extreme thrombocytosis, he will be at high risk of thrombotic complication.  - Patient and his family wants to get a second opinion from [place] before doing any procedure or starting chemo pill consisting of hydroxyurea.  - Referral for [facility] has been placed today on March 6, 2020.  He was seen at [facility] on March 23, 2020 by Dr. Arredondo and had a bone marrow biopsy done.  -Records were obtained and reviewed from [facility].  -Patient has been started on hydroxyurea 500 mg p.o. daily on March 28, 2020.  Patient is tolerating hydroxyurea well without any side effects.  -CBC done today on April 3, 2020 shows platelet count is 1831,000, hemoglobin is  13, white blood cell count is 18,000.  -We will increase hydroxyurea to 500 mg twice daily from today on April 3, 2020.  -We will ask patient to return to clinic in 3 weeks with repeat CBC and CMP on that day.  We will continue titrating hydroxyurea dose based on his CBC next clinic visit.  -In the interim recommend continue with aspirin 81 mg p.o. daily.    2.  Hepatitis C antibody positivity:  - Hepatitis testing done on February 26, 2020 shows hepatitis C antibody positivity in the blood.  -Quantitative RNA for hepatitis C is undetectable.    3.  Hypertension    4.  Health maintenance: Patient does not smoke.  Had a colonoscopy around 2001 at [facility].     5. Advance Care Planning: For now patient remains full code and is able to make decisions.  Patient has health care surrogate mentioned on chart.    6.  Prescriptions:  -Prescription for hydroxyurea 500 mg twice daily 1 month supply with 1 refill has been sent to his pharmacy today on April 3, 2020.           PHQ-9 Total Score: 0   -Patient is not homicidal or suicidal.  No acute intervention required.    Marv Messina reports a pain score of 0.  Given his pain assessment as noted, treatment options were discussed and the following options were decided upon as a follow-up plan to address the patient's pain: No acute intervention required.         Vadim Song MD  4/3/2020  11:07        EMR Dragon/Transcription disclaimer:   Much of this encounter note is an electronic transcription/translation of spoken language to printed text. The electronic translation of spoken language may permit erroneous, or at times, nonsensical words or phrases to be inadvertently transcribed; Although I have reviewed the note for such errors, some may still exist.

## 2020-04-22 ENCOUNTER — TELEPHONE (OUTPATIENT)
Dept: ONCOLOGY | Facility: CLINIC | Age: 65
End: 2020-04-22

## 2020-04-22 NOTE — TELEPHONE ENCOUNTER
Pt called requesting to update Dr. Song.     Pt stated he had some documentation to give Dr. Song and information on why he rescheduled 4/24/20 appt.    Pt also stated he had a blood test on 4/17/20 if Dr. Song would like to look at that.    Call pt at [phone] in regards to this.

## 2020-04-22 NOTE — TELEPHONE ENCOUNTER
Platelets are improving.  Recommend same dose of hydroxyurea for now.  Will recheck labs upon next clinic visit and adjust hydroxyurea dose.  Thank you

## 2020-04-24 ENCOUNTER — APPOINTMENT (OUTPATIENT)
Dept: ONCOLOGY | Facility: HOSPITAL | Age: 65
End: 2020-04-24

## 2020-04-30 ENCOUNTER — LAB (OUTPATIENT)
Dept: ONCOLOGY | Facility: HOSPITAL | Age: 65
End: 2020-04-30

## 2020-04-30 DIAGNOSIS — D75.839 THROMBOCYTOSIS: ICD-10-CM

## 2020-04-30 DIAGNOSIS — D72.829 LEUKOCYTOSIS, UNSPECIFIED TYPE: ICD-10-CM

## 2020-04-30 DIAGNOSIS — D47.1 MPN (MYELOPROLIFERATIVE NEOPLASM) (HCC): ICD-10-CM

## 2020-04-30 LAB
ALBUMIN SERPL-MCNC: 4 G/DL (ref 3.5–5.2)
ALBUMIN/GLOB SERPL: 1.4 G/DL
ALP SERPL-CCNC: 350 U/L (ref 39–117)
ALT SERPL W P-5'-P-CCNC: 90 U/L (ref 1–41)
ANION GAP SERPL CALCULATED.3IONS-SCNC: 13 MMOL/L (ref 5–15)
ANISOCYTOSIS BLD QL: ABNORMAL
AST SERPL-CCNC: 39 U/L (ref 1–40)
BASOPHILS # BLD MANUAL: 0.22 10*3/MM3 (ref 0–0.2)
BASOPHILS NFR BLD AUTO: 2 % (ref 0–1.5)
BILIRUB SERPL-MCNC: 0.7 MG/DL (ref 0.2–1.2)
BUN BLD-MCNC: 15 MG/DL (ref 8–23)
BUN/CREAT SERPL: 17.9 (ref 7–25)
CALCIUM SPEC-SCNC: 9.5 MG/DL (ref 8.6–10.5)
CHLORIDE SERPL-SCNC: 100 MMOL/L (ref 98–107)
CO2 SERPL-SCNC: 25 MMOL/L (ref 22–29)
CREAT BLD-MCNC: 0.84 MG/DL (ref 0.76–1.27)
DEPRECATED RDW RBC AUTO: 91 FL (ref 37–54)
EOSINOPHIL # BLD MANUAL: 0.22 10*3/MM3 (ref 0–0.4)
EOSINOPHIL NFR BLD MANUAL: 2 % (ref 0.3–6.2)
ERYTHROCYTE [DISTWIDTH] IN BLOOD BY AUTOMATED COUNT: 29.5 % (ref 12.3–15.4)
GFR SERPL CREATININE-BSD FRML MDRD: 92 ML/MIN/1.73
GLOBULIN UR ELPH-MCNC: 2.9 GM/DL
GLUCOSE BLD-MCNC: 94 MG/DL (ref 65–99)
HCT VFR BLD AUTO: 32.3 % (ref 37.5–51)
HGB BLD-MCNC: 10.6 G/DL (ref 13–17.7)
HYPOCHROMIA BLD QL: ABNORMAL
LYMPHOCYTES # BLD MANUAL: 2.78 10*3/MM3 (ref 0.7–3.1)
LYMPHOCYTES NFR BLD MANUAL: 11 % (ref 5–12)
LYMPHOCYTES NFR BLD MANUAL: 25 % (ref 19.6–45.3)
MCH RBC QN AUTO: 28.6 PG (ref 26.6–33)
MCHC RBC AUTO-ENTMCNC: 32.8 G/DL (ref 31.5–35.7)
MCV RBC AUTO: 87.1 FL (ref 79–97)
MONOCYTES # BLD AUTO: 1.22 10*3/MM3 (ref 0.1–0.9)
MYELOCYTES NFR BLD MANUAL: 1 % (ref 0–0)
NEUTROPHILS # BLD AUTO: 6.55 10*3/MM3 (ref 1.7–7)
NEUTROPHILS NFR BLD MANUAL: 58 % (ref 42.7–76)
NEUTS BAND NFR BLD MANUAL: 1 % (ref 0–5)
PLATELET # BLD AUTO: 900 10*3/MM3 (ref 140–450)
PMV BLD AUTO: 10.3 FL (ref 6–12)
POIKILOCYTOSIS BLD QL SMEAR: ABNORMAL
POTASSIUM BLD-SCNC: 4.2 MMOL/L (ref 3.5–5.2)
PROT SERPL-MCNC: 6.9 G/DL (ref 6–8.5)
RBC # BLD AUTO: 3.71 10*6/MM3 (ref 4.14–5.8)
SMALL PLATELETS BLD QL SMEAR: ABNORMAL
SODIUM BLD-SCNC: 138 MMOL/L (ref 136–145)
WBC MORPH BLD: NORMAL
WBC NRBC COR # BLD: 11.11 10*3/MM3 (ref 3.4–10.8)

## 2020-04-30 PROCEDURE — 80053 COMPREHEN METABOLIC PANEL: CPT

## 2020-04-30 PROCEDURE — 36415 COLL VENOUS BLD VENIPUNCTURE: CPT | Performed by: INTERNAL MEDICINE

## 2020-04-30 PROCEDURE — 85007 BL SMEAR W/DIFF WBC COUNT: CPT

## 2020-04-30 PROCEDURE — 85025 COMPLETE CBC W/AUTO DIFF WBC: CPT

## 2020-05-04 ENCOUNTER — APPOINTMENT (OUTPATIENT)
Dept: ONCOLOGY | Facility: HOSPITAL | Age: 65
End: 2020-05-04

## 2020-05-04 ENCOUNTER — TELEMEDICINE (OUTPATIENT)
Dept: ONCOLOGY | Facility: CLINIC | Age: 65
End: 2020-05-04

## 2020-05-04 DIAGNOSIS — R79.89 ELEVATED LFTS: ICD-10-CM

## 2020-05-04 DIAGNOSIS — D47.1 MPN (MYELOPROLIFERATIVE NEOPLASM) (HCC): Primary | ICD-10-CM

## 2020-05-04 DIAGNOSIS — R53.83 OTHER FATIGUE: ICD-10-CM

## 2020-05-04 DIAGNOSIS — D72.829 LEUKOCYTOSIS, UNSPECIFIED TYPE: ICD-10-CM

## 2020-05-04 DIAGNOSIS — D75.839 THROMBOCYTOSIS: ICD-10-CM

## 2020-05-04 DIAGNOSIS — D64.9 ANEMIA, UNSPECIFIED TYPE: ICD-10-CM

## 2020-05-04 PROCEDURE — 99214 OFFICE O/P EST MOD 30 MIN: CPT | Performed by: INTERNAL MEDICINE

## 2020-05-04 NOTE — PROGRESS NOTES
DATE OF VISIT: 5/4/2020    You have chosen to receive care through a telehealth visit.  Do you consent to use a video/audio connection for your medical care today? Yes       REASON FOR VISIT: Myeloproliferative neoplasm on hydroxyurea, anemia, thrombocytosis, leukocytosis      HISTORY OF PRESENT ILLNESS:    64-year-old male with history of hepatitis  diagnosed around 2000 after colonoscopy, recurrent sinus infection, recent diagnosis of high blood pressure was initially seen in consultation on February 26, 2020 for evaluation of abnormal CBC showing leukocytosis and thrombocytosis.Patient underwent bone marrow biopsy on March 23, 2020 at [facility] and was subsequently started on hydroxyurea 500 mg p.o. daily on March 28, 2020.  Dose of hydroxyurea was increased to 500 mg twice daily on April 3, 2020.  Patient had fever and dark urine on April 17, 2020 and was seen at urgent care at [facility].  Patient is here for follow-up with your visit today to discuss recently done blood work and further recommendation.  Denies any more fever.  Denies any bleeding.  Denies any new lymph node enlargement.  Complains of fatigue.        PAST MEDICAL HISTORY:    Past Medical History:   Diagnosis Date   • Hepatitis B    • Hypertension    • Sinus infection        SOCIAL HISTORY:    Social History     Tobacco Use   • Smoking status: Former Smoker   • Smokeless tobacco: Never Used   • Tobacco comment: quit in early 21 years old   Substance Use Topics   • Alcohol use: Yes     Alcohol/week: 1.0 standard drinks     Types: 1 Cans of beer per week     Comment: occasionally   • Drug use: Never       Surgical History :  Past Surgical History:   Procedure Laterality Date   • COLONOSCOPY     • HEMORRHOID BANDING         ALLERGIES:    Allergies   Allergen Reactions   • Other Rash     Pt was working with walnut wood and broke out in a rash   • Tetracycline Rash         FAMILY HISTORY:  Family History   Problem Relation Age of Onset    • Diabetes Father    • Cirrhosis Father    • Cancer Sister    • Cancer Maternal Uncle            REVIEW OF SYSTEMS:      CONSTITUTIONAL:  Complains of fatigue.   Complains of night sweats intermittently that has started 1 year ago.    Denies any fever, chills or weight loss.      EYES: No visual disturbances. No discharge. No new lesions     ENMT:  No epistaxis, mouth sores or difficulty swallowing.     RESPIRATORY:  No new shortness of breath. No new cough or hemoptysis.     CARDIOVASCULAR:  No chest pain or palpitations.     GASTROINTESTINAL:  No abdominal pain nausea, vomiting or blood in the stool.     GENITOURINARY: No Dysuria or Hematuria.     MUSCULOSKELETAL: Complains of arthritis pain affecting elbow and right shoulder.     LYMPHATICS:  Denies any abnormal swollen glands anywhere in the body.     NEUROLOGICAL : No tingling or numbness. No headache or dizziness. No seizures or balance problems.     SKIN: Complains of skin lesion affecting back.     ENDOCRINE : No new hot or cold intolerance. No new polyuria . No polydipsia.          PHYSICAL EXAMINATION:      VITAL SIGNS:  Afebrile(per patient), RR-16      ECOG performance status:     CONSTITUTIONAL:  Not in any distress.    EYES:  Extraocular Movements intact.No ptosis.    ENMT:  Normocephalic, Atraumatic.No Facial Asymmetry noted.    NECK:  No  visible adenopathy.    RESPIRATORY:  Fair respiratory effort.    MUSCULOSKELETAL:  No edema..Normal range of motion.    NEUROLOGIC:    No  Motor  deficit appreciated.  Moving all 4 extremities appropriately.    SKIN : No new skin lesion lesions.    LYMPHATICS: No visible enlarged lymph nodes in neck or supraclavicular area.    PSYCHIATRY: Alert, awake and oriented ×3.        DIAGNOSTIC DATA:    Glucose   Date Value Ref Range Status   04/30/2020 94 65 - 99 mg/dL Final     Sodium   Date Value Ref Range Status   04/30/2020 138 136 - 145 mmol/L Final     Potassium   Date Value Ref Range Status   04/30/2020 4.2 3.5 -  5.2 mmol/L Final     CO2   Date Value Ref Range Status   04/30/2020 25.0 22.0 - 29.0 mmol/L Final     Chloride   Date Value Ref Range Status   04/30/2020 100 98 - 107 mmol/L Final     Anion Gap   Date Value Ref Range Status   04/30/2020 13.0 5.0 - 15.0 mmol/L Final     Creatinine   Date Value Ref Range Status   04/30/2020 0.84 0.76 - 1.27 mg/dL Final     BUN   Date Value Ref Range Status   04/30/2020 15 8 - 23 mg/dL Final     BUN/Creatinine Ratio   Date Value Ref Range Status   04/30/2020 17.9 7.0 - 25.0 Final     Calcium   Date Value Ref Range Status   04/30/2020 9.5 8.6 - 10.5 mg/dL Final     eGFR Non  Amer   Date Value Ref Range Status   04/30/2020 92 >60 mL/min/1.73 Final     Alkaline Phosphatase   Date Value Ref Range Status   04/30/2020 350 (H) 39 - 117 U/L Final     Total Protein   Date Value Ref Range Status   04/30/2020 6.9 6.0 - 8.5 g/dL Final     ALT (SGPT)   Date Value Ref Range Status   04/30/2020 90 (H) 1 - 41 U/L Final     AST (SGOT)   Date Value Ref Range Status   04/30/2020 39 1 - 40 U/L Final     Total Bilirubin   Date Value Ref Range Status   04/30/2020 0.7 0.2 - 1.2 mg/dL Final     Albumin   Date Value Ref Range Status   04/30/2020 4.00 3.50 - 5.20 g/dL Final     Globulin   Date Value Ref Range Status   04/30/2020 2.9 gm/dL Final     Lab Results   Component Value Date    WBC 11.11 (H) 04/30/2020    HGB 10.6 (L) 04/30/2020    HCT 32.3 (L) 04/30/2020    MCV 87.1 04/30/2020     (H) 04/30/2020     Lab Results   Component Value Date    NEUTROABS 6.55 04/30/2020    IRON 84 02/26/2020    TIBC 375 02/26/2020    LABIRON 22 02/26/2020    FERRITIN 684 (H) 03/23/2020     No results found for: , LABCA2, AFPTM, HCGQUANT, , CHROMGRNA, [id number], CEA, REFLABREPO        PATHOLOGY DATA:     Pathology report from March 23, 2020 at [place] showed:     Diagnosis:  BONE MARROW - PERIPHERAL BLOOD SMEAR, ASPIRATE SMEAR, PARTICLE PREPARATION, BIOPSY AND FLOW CYTOMETRY: MYELOID NEOPLASM WITH  ABNORMAL MEGAKARYOCYTES AND RING SIDEROBLASTS; NO INCREASE IN BLASTS; SEE IMPRESSION     IMPRESSION: This is a markedly hypercellular marrow (>90%) with hyperplastic trilineage hematopoiesis and increased abnormal megakaryocytes with clustering. There is mild erythroid atypia and increased ring sideroblasts (15-20%) although this number may not be representative given the non-particulate and hemodiluted aspirate. There is no increase in blasts by flow cytometry on the hemodiluted aspirate and by immunohistochemistry performed on the biopsy. The marked peripheral thrombocytosis and mild neutrophilia is noted. There is no peripheral eosinophilia but there is mild increase in marrow eosinophils based on the biopsy sections. Overall, this constellation of findings is consistent with myeloid neoplasm with ring sideroblasts but no increase in myeloblasts. The features raise a possibility of myelodysplastic/myeloproliferative neoplasm with ring sideroblasts and thrombocytosis (MDS/MPN-RS-T). However the features may also be consistent with one of the specific subtypes of MPN if   the presence of ring sideroblasts can be attributed to a secondary cause such as toxic or certain drug exposures. Additional studies may help further characterize this process therefore final diagnosis depends on correlation with pending additional testing, as documented below, which will be included in the comprehensive hematopathology report.              ASSESSMENT AND PLAN:       1.  Leukocytosis and thrombocytosis due to myeloproliferative neoplasm:  - CBC done on February 26, 2020 showed white blood cell count is elevated 17,000 with platelet count of 1547,000.           -Work-up showed Kye 2 mutation was positive consistent with myeloproliferative neoplasm.  - BCR/ABL and peripheral blood flow cytometry is negative.  - Ultrasound of abdomen done on March 3, 2020 shows mild hepatosplenomegaly.  -Result of blood work and ultrasound of abdomen  were discussed with patient and his wife.  - Due to Kye 2 mutation being positive, recommend doing bone marrow biopsy to differentiate between polycythemia vera, essential thrombocytosis and myelofibrosis.  - It was also discussed with patient due to extreme thrombocytosis, he will be at high risk of thrombotic complication.  - Patient and his family wants to get a second opinion from [place] before doing any procedure or starting chemo pill consisting of hydroxyurea.  - Referral for [facility] has been placed today on March 6, 2020.  He was seen at [facility] on March 23, 2020 by Dr. Arredondo and had a bone marrow biopsy done.  -Records were obtained and reviewed from [facility].  -Patient was started on hydroxyurea 500 mg p.o. daily on March 28, 2020.   -Dose of hydroxyurea was increased to 500 mg twice daily on April 3, 2020.  - CBC done at [facility] on April 17, 2020 showed white blood cell count is 8.5, hemoglobin is 13.4, platelet count was 670,000.  -CBC done on April 30, 2020 showed white blood cell count is 11,000, hemoglobin is decreased to 10.6, platelet count is 900,000.  Recommend continuing hydroxyurea dose at 500 mg twice daily.  We will recheck CBC in about 10 days from now on May 14, 2020 and adjust her dose of hydroxyurea based on the next CBC results.  -We will do video visit on May 15, 2020 for further adjustment of hydroxyurea dose.  -In the interim recommend continue with aspirin 81 mg p.o. Daily.    2.  Anemia:  - Secondary to hydroxyurea plus or minus bone marrow suppression from recent antibiotic use.  Will recheck CBC next week.    3.  Elevated liver function test:  -Secondary to recent use of Tylenol plus antibiotics plus or minus Hydrea.  -We will recheck CMP next clinic visit.     4.  Hepatitis C antibody positivity:  - Hepatitis testing done on February 26, 2020 shows hepatitis C antibody positivity in the blood.  -Quantitative RNA for hepatitis C is  undetectable.     5.  Hypertension     6.  Health maintenance: Patient does not smoke.  Had a colonoscopy around 2001 at [place].     7. Advance Care Planning: For now patient remains full code and is able to make decisions.  Patient has health care surrogate mentioned on chart.      8.  Prescriptions: Patient has enough prescription for hydroxyurea      PHQ-9 Total Score: 0   -Patient is not homicidal or suicidal.  No acute intervention required.    Marv Messina reports a pain score of 0.  Given his pain assessment as noted, treatment options were discussed and the following options were decided upon as a follow-up plan to address the patient's pain: No acute intervention required.               This visit was completed as a video visit due to ongoing pandemic with coronavirus.    This video visit was completed through my chart using Zoom application.      Vadim Song MD  5/4/2020  16:13          Part of this note may be an electronic transcription/translation of spoken language to printed text using the Dragon Dictation System.

## 2020-05-14 ENCOUNTER — LAB (OUTPATIENT)
Dept: ONCOLOGY | Facility: HOSPITAL | Age: 65
End: 2020-05-14

## 2020-05-14 DIAGNOSIS — D75.839 THROMBOCYTOSIS: ICD-10-CM

## 2020-05-14 DIAGNOSIS — R79.89 ELEVATED LFTS: ICD-10-CM

## 2020-05-14 DIAGNOSIS — D47.1 MPN (MYELOPROLIFERATIVE NEOPLASM) (HCC): ICD-10-CM

## 2020-05-14 DIAGNOSIS — D64.9 ANEMIA, UNSPECIFIED TYPE: ICD-10-CM

## 2020-05-14 DIAGNOSIS — D72.829 LEUKOCYTOSIS, UNSPECIFIED TYPE: ICD-10-CM

## 2020-05-14 DIAGNOSIS — R53.83 OTHER FATIGUE: ICD-10-CM

## 2020-05-14 LAB
ALBUMIN SERPL-MCNC: 4.6 G/DL (ref 3.5–5.2)
ALBUMIN/GLOB SERPL: 1.8 G/DL
ALP SERPL-CCNC: 134 U/L (ref 39–117)
ALT SERPL W P-5'-P-CCNC: 36 U/L (ref 1–41)
ANION GAP SERPL CALCULATED.3IONS-SCNC: 13 MMOL/L (ref 5–15)
ANISOCYTOSIS BLD QL: ABNORMAL
AST SERPL-CCNC: 31 U/L (ref 1–40)
BASO STIPL COARSE BLD QL SMEAR: ABNORMAL
BASOPHILS # BLD AUTO: 0.15 10*3/MM3 (ref 0–0.2)
BASOPHILS # BLD MANUAL: 0.18 10*3/MM3 (ref 0–0.2)
BASOPHILS NFR BLD AUTO: 1.7 % (ref 0–1.5)
BASOPHILS NFR BLD AUTO: 2 % (ref 0–1.5)
BILIRUB SERPL-MCNC: 0.7 MG/DL (ref 0.2–1.2)
BUN BLD-MCNC: 14 MG/DL (ref 8–23)
BUN/CREAT SERPL: 13.1 (ref 7–25)
CALCIUM SPEC-SCNC: 9.8 MG/DL (ref 8.6–10.5)
CHLORIDE SERPL-SCNC: 101 MMOL/L (ref 98–107)
CO2 SERPL-SCNC: 26 MMOL/L (ref 22–29)
CREAT BLD-MCNC: 1.07 MG/DL (ref 0.76–1.27)
DEPRECATED RDW RBC AUTO: 99.5 FL (ref 37–54)
ELLIPTOCYTES BLD QL SMEAR: ABNORMAL
EOSINOPHIL # BLD AUTO: 0.26 10*3/MM3 (ref 0–0.4)
EOSINOPHIL # BLD MANUAL: 0.27 10*3/MM3 (ref 0–0.4)
EOSINOPHIL NFR BLD AUTO: 2.9 % (ref 0.3–6.2)
EOSINOPHIL NFR BLD MANUAL: 3 % (ref 0.3–6.2)
ERYTHROCYTE [DISTWIDTH] IN BLOOD BY AUTOMATED COUNT: 30 % (ref 12.3–15.4)
GFR SERPL CREATININE-BSD FRML MDRD: 70 ML/MIN/1.73
GLOBULIN UR ELPH-MCNC: 2.5 GM/DL
GLUCOSE BLD-MCNC: 112 MG/DL (ref 65–99)
HCT VFR BLD AUTO: 35.1 % (ref 37.5–51)
HGB BLD-MCNC: 11.4 G/DL (ref 13–17.7)
HOWELL-JOLLY BOD BLD QL SMEAR: PRESENT
HYPOCHROMIA BLD QL: ABNORMAL
IMM GRANULOCYTES # BLD AUTO: 0.07 10*3/MM3 (ref 0–0.05)
IMM GRANULOCYTES NFR BLD AUTO: 0.8 % (ref 0–0.5)
LYMPHOCYTES # BLD AUTO: 2.45 10*3/MM3 (ref 0.7–3.1)
LYMPHOCYTES # BLD MANUAL: 2.16 10*3/MM3 (ref 0.7–3.1)
LYMPHOCYTES NFR BLD AUTO: 27.2 % (ref 19.6–45.3)
LYMPHOCYTES NFR BLD MANUAL: 24 % (ref 19.6–45.3)
LYMPHOCYTES NFR BLD MANUAL: 4 % (ref 5–12)
MCH RBC QN AUTO: 29.4 PG (ref 26.6–33)
MCHC RBC AUTO-ENTMCNC: 32.5 G/DL (ref 31.5–35.7)
MCV RBC AUTO: 90.5 FL (ref 79–97)
MONOCYTES # BLD AUTO: 0.36 10*3/MM3 (ref 0.1–0.9)
MONOCYTES # BLD AUTO: 0.53 10*3/MM3 (ref 0.1–0.9)
MONOCYTES NFR BLD AUTO: 5.9 % (ref 5–12)
MYELOCYTES NFR BLD MANUAL: 1 % (ref 0–0)
NEUTROPHILS # BLD AUTO: 5.55 10*3/MM3 (ref 1.7–7)
NEUTROPHILS # BLD AUTO: 5.95 10*3/MM3 (ref 1.7–7)
NEUTROPHILS NFR BLD AUTO: 61.5 % (ref 42.7–76)
NEUTROPHILS NFR BLD MANUAL: 65 % (ref 42.7–76)
NEUTS BAND NFR BLD MANUAL: 1 % (ref 0–5)
NRBC BLD AUTO-RTO: 0.8 /100 WBC (ref 0–0.2)
PLATELET # BLD AUTO: 1215 10*3/MM3 (ref 140–450)
PMV BLD AUTO: 10.2 FL (ref 6–12)
POIKILOCYTOSIS BLD QL SMEAR: ABNORMAL
POTASSIUM BLD-SCNC: 4.1 MMOL/L (ref 3.5–5.2)
PROT SERPL-MCNC: 7.1 G/DL (ref 6–8.5)
RBC # BLD AUTO: 3.88 10*6/MM3 (ref 4.14–5.8)
SMALL PLATELETS BLD QL SMEAR: ABNORMAL
SODIUM BLD-SCNC: 140 MMOL/L (ref 136–145)
WBC MORPH BLD: NORMAL
WBC NRBC COR # BLD: 9.01 10*3/MM3 (ref 3.4–10.8)

## 2020-05-14 PROCEDURE — 85007 BL SMEAR W/DIFF WBC COUNT: CPT | Performed by: INTERNAL MEDICINE

## 2020-05-14 PROCEDURE — 85025 COMPLETE CBC W/AUTO DIFF WBC: CPT | Performed by: INTERNAL MEDICINE

## 2020-05-14 PROCEDURE — 36415 COLL VENOUS BLD VENIPUNCTURE: CPT | Performed by: INTERNAL MEDICINE

## 2020-05-14 PROCEDURE — 80053 COMPREHEN METABOLIC PANEL: CPT | Performed by: INTERNAL MEDICINE

## 2020-05-15 ENCOUNTER — TELEMEDICINE (OUTPATIENT)
Dept: ONCOLOGY | Facility: CLINIC | Age: 65
End: 2020-05-15

## 2020-05-15 DIAGNOSIS — D47.1 MPN (MYELOPROLIFERATIVE NEOPLASM) (HCC): Primary | ICD-10-CM

## 2020-05-15 DIAGNOSIS — D75.839 THROMBOCYTOSIS: ICD-10-CM

## 2020-05-15 DIAGNOSIS — R79.89 ELEVATED LFTS: ICD-10-CM

## 2020-05-15 DIAGNOSIS — D72.829 LEUKOCYTOSIS, UNSPECIFIED TYPE: ICD-10-CM

## 2020-05-15 DIAGNOSIS — D64.9 ANEMIA, UNSPECIFIED TYPE: ICD-10-CM

## 2020-05-15 PROCEDURE — 99214 OFFICE O/P EST MOD 30 MIN: CPT | Performed by: INTERNAL MEDICINE

## 2020-05-15 NOTE — PROGRESS NOTES
DATE OF VISIT: 5/15/2020    You have chosen to receive care through a telehealth visit.  Do you consent to use a video/audio connection for your medical care today? Yes       REASON FOR VISIT: Myeloproliferative neoplasm on hydroxyurea, anemia, thrombocytosis, leukocytosis      HISTORY OF PRESENT ILLNESS:    65-year-old male with history of hepatitis  diagnosed around 2000 after colonoscopy, recurrent sinus infection, recent diagnosis of high blood pressure was initially seen in consultation on February 26, 2020 for evaluation of abnormal CBC showing leukocytosis and thrombocytosis.Patient underwent bone marrow biopsy on March 23, 2020 at [facility] and was subsequently started on hydroxyurea 500 mg p.o. daily on March 28, 2020.  Dose of hydroxyurea was increased to 500 mg twice daily on April 3, 2020.  Patient had a blood work done on May 14, 2020, patient is here to discuss the results and further recommendation.  Denies any more fever.  Denies any bleeding.  Denies any new lymph node enlargement.  Complains of fatigue.        PAST MEDICAL HISTORY:    Past Medical History:   Diagnosis Date   • Hepatitis B    • Hypertension    • Sinus infection        SOCIAL HISTORY:    Social History     Tobacco Use   • Smoking status: Former Smoker   • Smokeless tobacco: Never Used   • Tobacco comment: quit in early 21 years old   Substance Use Topics   • Alcohol use: Yes     Alcohol/week: 1.0 standard drinks     Types: 1 Cans of beer per week     Comment: occasionally   • Drug use: Never       Surgical History :  Past Surgical History:   Procedure Laterality Date   • COLONOSCOPY     • HEMORRHOID BANDING         ALLERGIES:    Allergies   Allergen Reactions   • Other Rash     Pt was working with walnut wood and broke out in a rash   • Tetracycline Rash         FAMILY HISTORY:  Family History   Problem Relation Age of Onset   • Diabetes Father    • Cirrhosis Father    • Cancer Sister    • Cancer Maternal Uncle             REVIEW OF SYSTEMS:      CONSTITUTIONAL:  Complains of fatigue.   Complains of night sweats intermittently that has started 1 year ago.    Denies any fever, chills or weight loss.      EYES: No visual disturbances. No discharge. No new lesions     ENMT:  No epistaxis, mouth sores or difficulty swallowing.     RESPIRATORY:  No new shortness of breath. No new cough or hemoptysis.     CARDIOVASCULAR:  No chest pain or palpitations.     GASTROINTESTINAL:  No abdominal pain nausea, vomiting or blood in the stool.     GENITOURINARY: No Dysuria or Hematuria.     MUSCULOSKELETAL: Complains of arthritis pain affecting elbow and right shoulder.     LYMPHATICS:  Denies any abnormal swollen glands anywhere in the body.     NEUROLOGICAL : No tingling or numbness. No headache or dizziness. No seizures or balance problems.     SKIN: Complains of skin lesion affecting back.     ENDOCRINE : No new hot or cold intolerance. No new polyuria . No polydipsia.          PHYSICAL EXAMINATION:      VITAL SIGNS:  Afebrile(per patient), RR-16      ECOG performance status:   1  CONSTITUTIONAL:  Not in any distress.    EYES:  Extraocular Movements intact.No ptosis.    ENMT:  Normocephalic, Atraumatic.No Facial Asymmetry noted.    NECK:  No  visible adenopathy.    RESPIRATORY:  Fair respiratory effort.    MUSCULOSKELETAL:  No edema..Normal range of motion.    NEUROLOGIC:    No  Motor  deficit appreciated.  Moving all 4 extremities appropriately.    SKIN : No new skin lesion lesions.    LYMPHATICS: No visible enlarged lymph nodes in neck or supraclavicular area.    PSYCHIATRY: Alert, awake and oriented ×3.        DIAGNOSTIC DATA:    Glucose   Date Value Ref Range Status   05/14/2020 112 (H) 65 - 99 mg/dL Final     Sodium   Date Value Ref Range Status   05/14/2020 140 136 - 145 mmol/L Final     Potassium   Date Value Ref Range Status   05/14/2020 4.1 3.5 - 5.2 mmol/L Final     CO2   Date Value Ref Range Status   05/14/2020 26.0 22.0 - 29.0  mmol/L Final     Chloride   Date Value Ref Range Status   05/14/2020 101 98 - 107 mmol/L Final     Anion Gap   Date Value Ref Range Status   05/14/2020 13.0 5.0 - 15.0 mmol/L Final     Creatinine   Date Value Ref Range Status   05/14/2020 1.07 0.76 - 1.27 mg/dL Final     BUN   Date Value Ref Range Status   05/14/2020 14 8 - 23 mg/dL Final     BUN/Creatinine Ratio   Date Value Ref Range Status   05/14/2020 13.1 7.0 - 25.0 Final     Calcium   Date Value Ref Range Status   05/14/2020 9.8 8.6 - 10.5 mg/dL Final     eGFR Non  Amer   Date Value Ref Range Status   05/14/2020 70 >60 mL/min/1.73 Final     Alkaline Phosphatase   Date Value Ref Range Status   05/14/2020 134 (H) 39 - 117 U/L Final     Total Protein   Date Value Ref Range Status   05/14/2020 7.1 6.0 - 8.5 g/dL Final     ALT (SGPT)   Date Value Ref Range Status   05/14/2020 36 1 - 41 U/L Final     AST (SGOT)   Date Value Ref Range Status   05/14/2020 31 1 - 40 U/L Final     Total Bilirubin   Date Value Ref Range Status   05/14/2020 0.7 0.2 - 1.2 mg/dL Final     Albumin   Date Value Ref Range Status   05/14/2020 4.60 3.50 - 5.20 g/dL Final     Globulin   Date Value Ref Range Status   05/14/2020 2.5 gm/dL Final     Lab Results   Component Value Date    WBC 9.01 05/14/2020    HGB 11.4 (L) 05/14/2020    HCT 35.1 (L) 05/14/2020    MCV 90.5 05/14/2020    PLT 1,215 (C) 05/14/2020     Lab Results   Component Value Date    NEUTROABS 5.55 05/14/2020    NEUTROABS 5.95 05/14/2020    IRON 84 02/26/2020    TIBC 375 02/26/2020    LABIRON 22 02/26/2020    FERRITIN 684 (H) 03/23/2020     No results found for: , LABCA2, AFPTM, HCGQUANT, , CHROMGRNA, [id number], CEA, REFLABREPO        PATHOLOGY DATA:     Pathology report from March 23, 2020 at [place] showed:     Diagnosis:  BONE MARROW - PERIPHERAL BLOOD SMEAR, ASPIRATE SMEAR, PARTICLE PREPARATION, BIOPSY AND FLOW CYTOMETRY: MYELOID NEOPLASM WITH ABNORMAL MEGAKARYOCYTES AND RING SIDEROBLASTS; NO INCREASE IN  BLASTS; SEE IMPRESSION     IMPRESSION: This is a markedly hypercellular marrow (>90%) with hyperplastic trilineage hematopoiesis and increased abnormal megakaryocytes with clustering. There is mild erythroid atypia and increased ring sideroblasts (15-20%) although this number may not be representative given the non-particulate and hemodiluted aspirate. There is no increase in blasts by flow cytometry on the hemodiluted aspirate and by immunohistochemistry performed on the biopsy. The marked peripheral thrombocytosis and mild neutrophilia is noted. There is no peripheral eosinophilia but there is mild increase in marrow eosinophils based on the biopsy sections. Overall, this constellation of findings is consistent with myeloid neoplasm with ring sideroblasts but no increase in myeloblasts. The features raise a possibility of myelodysplastic/myeloproliferative neoplasm with ring sideroblasts and thrombocytosis (MDS/MPN-RS-T). However the features may also be consistent with one of the specific subtypes of MPN if   the presence of ring sideroblasts can be attributed to a secondary cause such as toxic or certain drug exposures. Additional studies may help further characterize this process therefore final diagnosis depends on correlation with pending additional testing, as documented below, which will be included in the comprehensive hematopathology report.              ASSESSMENT AND PLAN:       1.  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           -Work-up showed Kye 2 mutation was positive consistent with myeloproliferative neoplasm.  - BCR/ABL and peripheral blood flow cytometry is negative.  - Ultrasound of abdomen done on March 3, 2020 shows mild hepatosplenomegaly.  -Result of blood work and ultrasound of abdomen were discussed with patient and his  wife.  - Due to Kye 2 mutation being positive, recommend doing bone marrow biopsy to differentiate between polycythemia vera, essential thrombocytosis and myelofibrosis.  - It was also discussed with patient due to extreme thrombocytosis, he will be at high risk of thrombotic complication.  - Patient and his family wants to get a second opinion from [place] before doing any procedure or starting chemo pill consisting of hydroxyurea.  - Referral for [facility] has been placed today on March 6, 2020.  He was seen at [facility] on March 23, 2020 by Dr. Arredondo and had a bone marrow biopsy done.  -Records were obtained and reviewed from [facility].  -Patient was started on hydroxyurea 500 mg p.o. daily on March 28, 2020.   -Dose of hydroxyurea was increased to 500 mg twice daily on April 3, 2020.  - CBC done at [facility] on April 17, 2020 showed white blood cell count is 8.5, hemoglobin is 13.4, platelet count was 670,000.  -CBC done on April 30, 2020 showed white blood cell count is 11,000, hemoglobin is decreased to 10.6, platelet count is 900,000.  Recommend continuing hydroxyurea dose at 500 mg twice daily.    -CBC done on May 14, 2020 shows white blood cell count is normal at 9000, hemoglobin is 11.4, platelet count is increased to 1215,000.  Due to rising platelets, recommend increasing hydroxyurea to 500 mg in a.m. and 1000 mg in the evening.  We will recheck CBC in about 2 weeks from now to follow-up on blood counts.  If platelet count continues to rise, patient may need some other form of myeloproliferative neoplasm treatment like Jakafi or anagrelide.  Case was also discussed with Dr. Arredondo at [place] who is in agreement for current treatment plan.  --In the interim recommend continue with aspirin 81 mg p.o. Daily.    2.  Anemia:  - Secondary to hydroxyurea plus or minus bone marrow suppression   -Hemoglobin is 11.5.  Will check anemia work-up upon next clinic visit in  2 weeks    3.  Elevated liver function test:  -Secondary to recent use of Tylenol plus antibiotics plus or minus Hydrea.  -Liver Function test is showing improvement.     4.  Hepatitis C antibody positivity:  - Hepatitis testing done on February 26, 2020 shows hepatitis C antibody positivity in the blood.  -Quantitative RNA for hepatitis C is undetectable.     5.  Hypertension     6.  Health maintenance: Patient does not smoke.  Had a colonoscopy around 2001 at [place].     7. Advance Care Planning: For now patient remains full code and is able to make decisions.  Patient has health care surrogate mentioned on chart.      8.  Prescriptions: Patient has enough prescription for hydroxyurea      PHQ-9 Total Score:     -Patient is not homicidal or suicidal.  No acute intervention required.    Marv Messina reports a pain score of 0.  Given his pain assessment as noted, treatment options were discussed and the following options were decided upon as a follow-up plan to address the patient's pain: No acute intervention required.               This visit was completed as a video visit due to ongoing pandemic with coronavirus.    This video visit was completed through my chart using Zoom application.      Vadim Song MD  5/15/2020  14:04          Part of this note may be an electronic transcription/translation of spoken language to printed text using the Dragon Dictation System.

## 2020-05-22 ENCOUNTER — TELEPHONE (OUTPATIENT)
Dept: ONCOLOGY | Facility: CLINIC | Age: 65
End: 2020-05-22

## 2020-05-22 NOTE — TELEPHONE ENCOUNTER
Pt called requesting to know if he can have his cholesterol checked on 5/28/20 lab appt.    Call pt at [phone] in regards to this.

## 2020-05-27 ENCOUNTER — TELEPHONE (OUTPATIENT)
Dept: ONCOLOGY | Facility: CLINIC | Age: 65
End: 2020-05-27

## 2020-05-28 ENCOUNTER — LAB (OUTPATIENT)
Dept: ONCOLOGY | Facility: HOSPITAL | Age: 65
End: 2020-05-28

## 2020-05-28 DIAGNOSIS — R79.89 ELEVATED LFTS: ICD-10-CM

## 2020-05-28 DIAGNOSIS — D72.829 LEUKOCYTOSIS, UNSPECIFIED TYPE: ICD-10-CM

## 2020-05-28 DIAGNOSIS — D75.839 THROMBOCYTOSIS: ICD-10-CM

## 2020-05-28 DIAGNOSIS — D64.9 ANEMIA, UNSPECIFIED TYPE: ICD-10-CM

## 2020-05-28 DIAGNOSIS — D47.1 MPN (MYELOPROLIFERATIVE NEOPLASM) (HCC): ICD-10-CM

## 2020-05-28 LAB
ALBUMIN SERPL-MCNC: 4.6 G/DL (ref 3.5–5.2)
ALBUMIN/GLOB SERPL: 1.9 G/DL
ALP SERPL-CCNC: 76 U/L (ref 39–117)
ALT SERPL W P-5'-P-CCNC: 34 U/L (ref 1–41)
ANION GAP SERPL CALCULATED.3IONS-SCNC: 10 MMOL/L (ref 5–15)
ANISOCYTOSIS BLD QL: ABNORMAL
AST SERPL-CCNC: 31 U/L (ref 1–40)
BASOPHILS # BLD AUTO: 0.3 10*3/MM3 (ref 0–0.2)
BASOPHILS # BLD MANUAL: 0.12 10*3/MM3 (ref 0–0.2)
BASOPHILS NFR BLD AUTO: 1 % (ref 0–1.5)
BASOPHILS NFR BLD AUTO: 2.5 % (ref 0–1.5)
BILIRUB SERPL-MCNC: 0.8 MG/DL (ref 0.2–1.2)
BUN BLD-MCNC: 14 MG/DL (ref 8–23)
BUN/CREAT SERPL: 16.5 (ref 7–25)
CALCIUM SPEC-SCNC: 9.3 MG/DL (ref 8.6–10.5)
CHLORIDE SERPL-SCNC: 104 MMOL/L (ref 98–107)
CO2 SERPL-SCNC: 26 MMOL/L (ref 22–29)
CREAT BLD-MCNC: 0.85 MG/DL (ref 0.76–1.27)
DEPRECATED RDW RBC AUTO: 101.4 FL (ref 37–54)
EOSINOPHIL # BLD AUTO: 0.23 10*3/MM3 (ref 0–0.4)
EOSINOPHIL # BLD MANUAL: 0.24 10*3/MM3 (ref 0–0.4)
EOSINOPHIL NFR BLD AUTO: 1.9 % (ref 0.3–6.2)
EOSINOPHIL NFR BLD MANUAL: 2 % (ref 0.3–6.2)
ERYTHROCYTE [DISTWIDTH] IN BLOOD BY AUTOMATED COUNT: 30.5 % (ref 12.3–15.4)
FERRITIN SERPL-MCNC: 687 NG/ML (ref 30–400)
FOLATE SERPL-MCNC: 11.7 NG/ML (ref 4.78–24.2)
GFR SERPL CREATININE-BSD FRML MDRD: 91 ML/MIN/1.73
GLOBULIN UR ELPH-MCNC: 2.4 GM/DL
GLUCOSE BLD-MCNC: 102 MG/DL (ref 65–99)
HCT VFR BLD AUTO: 36.9 % (ref 37.5–51)
HGB BLD-MCNC: 12.2 G/DL (ref 13–17.7)
HYPOCHROMIA BLD QL: ABNORMAL
IMM GRANULOCYTES # BLD AUTO: 0.06 10*3/MM3 (ref 0–0.05)
IMM GRANULOCYTES NFR BLD AUTO: 0.5 % (ref 0–0.5)
IRON 24H UR-MRATE: 109 MCG/DL (ref 59–158)
IRON SATN MFR SERPL: 36 % (ref 20–50)
LDH SERPL-CCNC: 253 U/L (ref 135–225)
LYMPHOCYTES # BLD AUTO: 3.8 10*3/MM3 (ref 0.7–3.1)
LYMPHOCYTES # BLD MANUAL: 3.42 10*3/MM3 (ref 0.7–3.1)
LYMPHOCYTES NFR BLD AUTO: 31.1 % (ref 19.6–45.3)
LYMPHOCYTES NFR BLD MANUAL: 28 % (ref 19.6–45.3)
LYMPHOCYTES NFR BLD MANUAL: 6 % (ref 5–12)
MACROCYTES BLD QL SMEAR: ABNORMAL
MCH RBC QN AUTO: 30.5 PG (ref 26.6–33)
MCHC RBC AUTO-ENTMCNC: 33.1 G/DL (ref 31.5–35.7)
MCV RBC AUTO: 92.3 FL (ref 79–97)
METAMYELOCYTES NFR BLD MANUAL: 1 % (ref 0–0)
MONOCYTES # BLD AUTO: 0.73 10*3/MM3 (ref 0.1–0.9)
MONOCYTES # BLD AUTO: 0.86 10*3/MM3 (ref 0.1–0.9)
MONOCYTES NFR BLD AUTO: 7 % (ref 5–12)
NEUTROPHILS # BLD AUTO: 6.98 10*3/MM3 (ref 1.7–7)
NEUTROPHILS # BLD AUTO: 7.22 10*3/MM3 (ref 1.7–7)
NEUTROPHILS NFR BLD AUTO: 57 % (ref 42.7–76)
NEUTROPHILS NFR BLD MANUAL: 58 % (ref 42.7–76)
NEUTS BAND NFR BLD MANUAL: 1 % (ref 0–5)
NRBC BLD AUTO-RTO: 0.2 /100 WBC (ref 0–0.2)
NRBC SPEC MANUAL: 1 /100 WBC (ref 0–0.2)
PLATELET # BLD AUTO: 1025 10*3/MM3 (ref 140–450)
PMV BLD AUTO: 10.3 FL (ref 6–12)
POLYCHROMASIA BLD QL SMEAR: ABNORMAL
POTASSIUM BLD-SCNC: 4.2 MMOL/L (ref 3.5–5.2)
PROT SERPL-MCNC: 7 G/DL (ref 6–8.5)
RBC # BLD AUTO: 4 10*6/MM3 (ref 4.14–5.8)
SMALL PLATELETS BLD QL SMEAR: ABNORMAL
SODIUM BLD-SCNC: 140 MMOL/L (ref 136–145)
TARGETS BLD QL SMEAR: ABNORMAL
TIBC SERPL-MCNC: 301 MCG/DL (ref 298–536)
TRANSFERRIN SERPL-MCNC: 202 MG/DL (ref 200–360)
VARIANT LYMPHS NFR BLD MANUAL: 3 % (ref 0–5)
VIT B12 BLD-MCNC: >2000 PG/ML (ref 211–946)
WBC MORPH BLD: NORMAL
WBC NRBC COR # BLD: 12.23 10*3/MM3 (ref 3.4–10.8)

## 2020-05-28 PROCEDURE — 84466 ASSAY OF TRANSFERRIN: CPT | Performed by: INTERNAL MEDICINE

## 2020-05-28 PROCEDURE — 82746 ASSAY OF FOLIC ACID SERUM: CPT | Performed by: INTERNAL MEDICINE

## 2020-05-28 PROCEDURE — 83615 LACTATE (LD) (LDH) ENZYME: CPT | Performed by: INTERNAL MEDICINE

## 2020-05-28 PROCEDURE — 82728 ASSAY OF FERRITIN: CPT | Performed by: INTERNAL MEDICINE

## 2020-05-28 PROCEDURE — 36415 COLL VENOUS BLD VENIPUNCTURE: CPT | Performed by: INTERNAL MEDICINE

## 2020-05-28 PROCEDURE — 82607 VITAMIN B-12: CPT | Performed by: INTERNAL MEDICINE

## 2020-05-28 PROCEDURE — 85025 COMPLETE CBC W/AUTO DIFF WBC: CPT | Performed by: INTERNAL MEDICINE

## 2020-05-28 PROCEDURE — 83540 ASSAY OF IRON: CPT | Performed by: INTERNAL MEDICINE

## 2020-05-28 PROCEDURE — 85007 BL SMEAR W/DIFF WBC COUNT: CPT | Performed by: INTERNAL MEDICINE

## 2020-05-28 PROCEDURE — 80053 COMPREHEN METABOLIC PANEL: CPT | Performed by: INTERNAL MEDICINE

## 2020-05-29 ENCOUNTER — TELEMEDICINE (OUTPATIENT)
Dept: ONCOLOGY | Facility: CLINIC | Age: 65
End: 2020-05-29

## 2020-05-29 DIAGNOSIS — D72.829 LEUKOCYTOSIS, UNSPECIFIED TYPE: ICD-10-CM

## 2020-05-29 PROCEDURE — 99214 OFFICE O/P EST MOD 30 MIN: CPT | Performed by: INTERNAL MEDICINE

## 2020-05-29 RX ORDER — FOLIC ACID 1 MG/1
TABLET ORAL
Qty: 36 TABLET | Refills: 1 | Status: SHIPPED | OUTPATIENT
Start: 2020-05-29 | End: 2020-08-28 | Stop reason: SDUPTHER

## 2020-05-29 NOTE — PROGRESS NOTES
DATE OF VISIT: 5/29/2020    You have chosen to receive care through a telehealth visit.  Do you consent to use a video/audio connection for your medical care today? Yes       REASON FOR VISIT: Myeloproliferative neoplasm on hydroxyurea, anemia, thrombocytosis, leukocytosis      HISTORY OF PRESENT ILLNESS:    65-year-old male with history of hepatitis  diagnosed around 2000 after colonoscopy, recurrent sinus infection, recent diagnosis of high blood pressure was initially seen in consultation on February 26, 2020 for evaluation of abnormal CBC showing leukocytosis and thrombocytosis.Patient underwent bone marrow biopsy on March 23, 2020 at [facility] and was subsequently started on hydroxyurea 500 mg p.o. daily on March 28, 2020.  Dose of hydroxyurea was increased to 500 mg twice daily on April 3, 2020.  Dose of hydroxyurea was increased to 1500 mg daily on May 15, 2020.  Patient is here for 2-week follow-up appointment today to discuss recently done blood work.  States he feels better.  Denies any nausea or vomiting or diarrhea since increasing dose of hydroxyurea.  Denies any new chest pain or shortness of breath.    Denies any more fever.  Denies any bleeding.  Denies any new lymph node enlargement.          PAST MEDICAL HISTORY:    Past Medical History:   Diagnosis Date   • Hepatitis B    • Hypertension    • Sinus infection        SOCIAL HISTORY:    Social History     Tobacco Use   • Smoking status: Former Smoker   • Smokeless tobacco: Never Used   • Tobacco comment: quit in early 21 years old   Substance Use Topics   • Alcohol use: Yes     Alcohol/week: 1.0 standard drinks     Types: 1 Cans of beer per week     Comment: occasionally   • Drug use: Never       Surgical History :  Past Surgical History:   Procedure Laterality Date   • COLONOSCOPY     • HEMORRHOID BANDING         ALLERGIES:    Allergies   Allergen Reactions   • Other Rash     Pt was working with walnut wood and broke out in a rash   •  Tetracycline Rash         FAMILY HISTORY:  Family History   Problem Relation Age of Onset   • Diabetes Father    • Cirrhosis Father    • Cancer Sister    • Cancer Maternal Uncle            REVIEW OF SYSTEMS:      CONSTITUTIONAL:  Complains of fatigue.   Complains of night sweats intermittently that has started 1 year ago.    Denies any fever, chills or weight loss.      EYES: No visual disturbances. No discharge. No new lesions     ENMT:  No epistaxis, mouth sores or difficulty swallowing.     RESPIRATORY:  No new shortness of breath. No new cough or hemoptysis.     CARDIOVASCULAR:  No chest pain or palpitations.     GASTROINTESTINAL:  No abdominal pain nausea, vomiting or blood in the stool.     GENITOURINARY: No Dysuria or Hematuria.     MUSCULOSKELETAL: Complains of arthritis pain affecting elbow and right shoulder.     LYMPHATICS:  Denies any abnormal swollen glands anywhere in the body.     NEUROLOGICAL : No tingling or numbness. No headache or dizziness. No seizures or balance problems.     SKIN: Complains of skin lesion affecting back.     ENDOCRINE : No new hot or cold intolerance. No new polyuria . No polydipsia.          PHYSICAL EXAMINATION:      VITAL SIGNS:  Afebrile(per patient), RR-16      ECOG performance status: 1      CONSTITUTIONAL:  Not in any distress.    EYES:  Extraocular Movements intact.No ptosis.    ENMT:  Normocephalic, Atraumatic.No Facial Asymmetry noted.    NECK:  No  visible adenopathy.    RESPIRATORY:  Fair respiratory effort.    MUSCULOSKELETAL:  No edema..Normal range of motion.    NEUROLOGIC:    No  Motor  deficit appreciated.  Moving all 4 extremities appropriately.    SKIN : No new skin lesion lesions.    LYMPHATICS: No visible enlarged lymph nodes in neck or supraclavicular area.    PSYCHIATRY: Alert, awake and oriented ×3.        DIAGNOSTIC DATA:    Glucose   Date Value Ref Range Status   05/28/2020 102 (H) 65 - 99 mg/dL Final     Sodium   Date Value Ref Range Status    05/28/2020 140 136 - 145 mmol/L Final     Potassium   Date Value Ref Range Status   05/28/2020 4.2 3.5 - 5.2 mmol/L Final     CO2   Date Value Ref Range Status   05/28/2020 26.0 22.0 - 29.0 mmol/L Final     Chloride   Date Value Ref Range Status   05/28/2020 104 98 - 107 mmol/L Final     Anion Gap   Date Value Ref Range Status   05/28/2020 10.0 5.0 - 15.0 mmol/L Final     Creatinine   Date Value Ref Range Status   05/28/2020 0.85 0.76 - 1.27 mg/dL Final     BUN   Date Value Ref Range Status   05/28/2020 14 8 - 23 mg/dL Final     BUN/Creatinine Ratio   Date Value Ref Range Status   05/28/2020 16.5 7.0 - 25.0 Final     Calcium   Date Value Ref Range Status   05/28/2020 9.3 8.6 - 10.5 mg/dL Final     eGFR Non  Amer   Date Value Ref Range Status   05/28/2020 91 >60 mL/min/1.73 Final     Alkaline Phosphatase   Date Value Ref Range Status   05/28/2020 76 39 - 117 U/L Final     Total Protein   Date Value Ref Range Status   05/28/2020 7.0 6.0 - 8.5 g/dL Final     ALT (SGPT)   Date Value Ref Range Status   05/28/2020 34 1 - 41 U/L Final     AST (SGOT)   Date Value Ref Range Status   05/28/2020 31 1 - 40 U/L Final     Total Bilirubin   Date Value Ref Range Status   05/28/2020 0.8 0.2 - 1.2 mg/dL Final     Albumin   Date Value Ref Range Status   05/28/2020 4.60 3.50 - 5.20 g/dL Final     Globulin   Date Value Ref Range Status   05/28/2020 2.4 gm/dL Final     Lab Results   Component Value Date    WBC 12.23 (H) 05/28/2020    HGB 12.2 (L) 05/28/2020    HCT 36.9 (L) 05/28/2020    MCV 92.3 05/28/2020    PLT 1,025 (C) 05/28/2020     Lab Results   Component Value Date    NEUTROABS 6.98 05/28/2020    NEUTROABS 7.22 (H) 05/28/2020    IRON 109 05/28/2020    TIBC 301 05/28/2020    LABIRON 36 05/28/2020    FERRITIN 687.00 (H) 05/28/2020    NTATGJCV54 >2,000 (H) 05/28/2020    FOLATE 11.70 05/28/2020     No results found for: , LABCA2, AFPTM, HCGQUANT, , CHROMGRNA, [id number], CEA, REFLABREPO        PATHOLOGY  DATA:     Pathology report from March 23, 2020 at [place] showed:     Diagnosis:  BONE MARROW - PERIPHERAL BLOOD SMEAR, ASPIRATE SMEAR, PARTICLE PREPARATION, BIOPSY AND FLOW CYTOMETRY: MYELOID NEOPLASM WITH ABNORMAL MEGAKARYOCYTES AND RING SIDEROBLASTS; NO INCREASE IN BLASTS; SEE IMPRESSION     IMPRESSION: This is a markedly hypercellular marrow (>90%) with hyperplastic trilineage hematopoiesis and increased abnormal megakaryocytes with clustering. There is mild erythroid atypia and increased ring sideroblasts (15-20%) although this number may not be representative given the non-particulate and hemodiluted aspirate. There is no increase in blasts by flow cytometry on the hemodiluted aspirate and by immunohistochemistry performed on the biopsy. The marked peripheral thrombocytosis and mild neutrophilia is noted. There is no peripheral eosinophilia but there is mild increase in marrow eosinophils based on the biopsy sections. Overall, this constellation of findings is consistent with myeloid neoplasm with ring sideroblasts but no increase in myeloblasts. The features raise a possibility of myelodysplastic/myeloproliferative neoplasm with ring sideroblasts and thrombocytosis (MDS/MPN-RS-T). However the features may also be consistent with one of the specific subtypes of MPN if   the presence of ring sideroblasts can be attributed to a secondary cause such as toxic or certain drug exposures. Additional studies may help further characterize this process therefore final diagnosis depends on correlation with pending additional testing, as documented below, which will be included in the comprehensive hematopathology report.              ASSESSMENT AND PLAN:       1.  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           -Work-up showed Kye 2 mutation was positive  consistent with myeloproliferative neoplasm.  - BCR/ABL and peripheral blood flow cytometry is negative.  - Ultrasound of abdomen done on March 3, 2020 shows mild hepatosplenomegaly.  -Result of blood work and ultrasound of abdomen were discussed with patient and his wife.  - Due to Kye 2 mutation being positive, recommend doing bone marrow biopsy to differentiate between polycythemia vera, essential thrombocytosis and myelofibrosis.  - It was also discussed with patient due to extreme thrombocytosis, he will be at high risk of thrombotic complication.  - Patient and his family wants to get a second opinion from [facility] before doing any procedure or starting chemo pill consisting of hydroxyurea.  - Referral for [facility] has been placed today on March 6, 2020.  He was seen at [facility] on March 23, 2020 by Dr. Arredondo and had a bone marrow biopsy done.  -Records were obtained and reviewed from [facility].  -Patient was started on hydroxyurea 500 mg p.o. daily on March 28, 2020.   -Dose of hydroxyurea was increased to 500 mg twice daily on April 3, 2020.  - CBC done at [facility] on April 17, 2020 showed white blood cell count is 8.5, hemoglobin is 13.4, platelet count was 670,000.  -CBC done on April 30, 2020 showed white blood cell count is 11,000, hemoglobin is decreased to 10.6, platelet count is 900,000.  Recommend continuing hydroxyurea dose at 500 mg twice daily.    -Due to rising platelets, recommend increasing hydroxyurea to 500 mg in a.m. and 1000 mg in the evening on May 15, 2020.  -CBC done on May 28, 2020 shows white blood cell count is 12,000, hemoglobin is improved to 12.2, platelet count is 1025,000.  Due to significantly elevated platelet count, option of increasing hydroxyurea to 2000 mg daily was discussed with patient.  He is reluctant to increase dose of hydroxyurea for now.  -We will continue with hydroxyurea 1500 mg p.o. daily.  In 2 weeks we will recheck CBC  and have a follow-up video visit regarding further management.  If patient starts having worsening thrombocytosis, he will need to be started on anagrelide.  --In the interim recommend continue with aspirin 81 mg p.o. Daily.    2.  Anemia:  - Secondary to hydroxyurea plus or minus bone marrow suppression   -Hemoglobin is 12.2.    -Anemia work-up done on May 20, 2020 shows adequate amount of iron and B12.  Folate level is 11, will start patient on folic acid 1 mg 3 times a week.    3.  Elevated liver function test:  -Liver function test is normal on May 28, 2020     4.  Hepatitis C antibody positivity:  - Hepatitis testing done on February 26, 2020 shows hepatitis C antibody positivity in the blood.  -Quantitative RNA for hepatitis C is undetectable.     5.  Hypertension     6.  Health maintenance: Patient does not smoke.  Had a colonoscopy around 2001 at [place].     7. Advance Care Planning: For now patient remains full code and is able to make decisions.  Patient has health care surrogate mentioned on chart.      8.  Prescriptions: Patient has enough prescription for hydroxyurea      PHQ-9 Total Score:     -Patient is not homicidal or suicidal.  No acute intervention required.    Marv Messina reports a pain score of 0.  Given his pain assessment as noted, treatment options were discussed and the following options were decided upon as a follow-up plan to address the patient's pain: No acute intervention required.               This visit was completed as a video visit due to ongoing pandemic with coronavirus.    This video visit was completed through my chart using Zoom application.      Vadim Song MD  5/29/2020  09:50          Part of this note may be an electronic transcription/translation of spoken language to printed text using the Dragon Dictation System.

## 2020-06-02 ENCOUNTER — TELEPHONE (OUTPATIENT)
Dept: ONCOLOGY | Facility: CLINIC | Age: 65
End: 2020-06-02

## 2020-06-02 RX ORDER — HYDROXYUREA 500 MG/1
CAPSULE ORAL
Qty: 90 CAPSULE | Refills: 1 | Status: SHIPPED | OUTPATIENT
Start: 2020-06-02 | End: 2020-12-18 | Stop reason: SDUPTHER

## 2020-06-02 NOTE — TELEPHONE ENCOUNTER
Pt called to inquire on status of refill request he sent thru MyChart.    Pt may only have 1 day supply left.    hydroxyurea (HYDREA) 500 MG capsule [Vadim Song MD]     Preferred pharmacy: [address] [phone] [facility] [phone] [facility]   Delivery method: Pickup     Pt's phone #:  [phone]

## 2020-06-09 DIAGNOSIS — D47.1 MPN (MYELOPROLIFERATIVE NEOPLASM) (HCC): Primary | ICD-10-CM

## 2020-06-11 ENCOUNTER — LAB (OUTPATIENT)
Dept: ONCOLOGY | Facility: HOSPITAL | Age: 65
End: 2020-06-11

## 2020-06-11 DIAGNOSIS — D47.1 MPN (MYELOPROLIFERATIVE NEOPLASM) (HCC): ICD-10-CM

## 2020-06-11 LAB
ALBUMIN SERPL-MCNC: 4.8 G/DL (ref 3.5–5.2)
ALBUMIN/GLOB SERPL: 1.9 G/DL
ALP SERPL-CCNC: 69 U/L (ref 39–117)
ALT SERPL W P-5'-P-CCNC: 35 U/L (ref 1–41)
ANION GAP SERPL CALCULATED.3IONS-SCNC: 11 MMOL/L (ref 5–15)
AST SERPL-CCNC: 26 U/L (ref 1–40)
BASOPHILS # BLD AUTO: 0.2 10*3/MM3 (ref 0–0.2)
BASOPHILS NFR BLD AUTO: 2 % (ref 0–1.5)
BILIRUB SERPL-MCNC: 0.9 MG/DL (ref 0.2–1.2)
BUN BLD-MCNC: 17 MG/DL (ref 8–23)
BUN/CREAT SERPL: 21.5 (ref 7–25)
CALCIUM SPEC-SCNC: 9.3 MG/DL (ref 8.6–10.5)
CHLORIDE SERPL-SCNC: 103 MMOL/L (ref 98–107)
CO2 SERPL-SCNC: 25 MMOL/L (ref 22–29)
CREAT BLD-MCNC: 0.79 MG/DL (ref 0.76–1.27)
DEPRECATED RDW RBC AUTO: 95.8 FL (ref 37–54)
EOSINOPHIL # BLD AUTO: 0.32 10*3/MM3 (ref 0–0.4)
EOSINOPHIL NFR BLD AUTO: 3.3 % (ref 0.3–6.2)
ERYTHROCYTE [DISTWIDTH] IN BLOOD BY AUTOMATED COUNT: 29.2 % (ref 12.3–15.4)
GFR SERPL CREATININE-BSD FRML MDRD: 99 ML/MIN/1.73
GLOBULIN UR ELPH-MCNC: 2.5 GM/DL
GLUCOSE BLD-MCNC: 113 MG/DL (ref 65–99)
HCT VFR BLD AUTO: 35.3 % (ref 37.5–51)
HGB BLD-MCNC: 11.8 G/DL (ref 13–17.7)
IMM GRANULOCYTES # BLD AUTO: 0.03 10*3/MM3 (ref 0–0.05)
IMM GRANULOCYTES NFR BLD AUTO: 0.3 % (ref 0–0.5)
LDH SERPL-CCNC: 218 U/L (ref 135–225)
LYMPHOCYTES # BLD AUTO: 3.19 10*3/MM3 (ref 0.7–3.1)
LYMPHOCYTES NFR BLD AUTO: 32.6 % (ref 19.6–45.3)
MCH RBC QN AUTO: 30.6 PG (ref 26.6–33)
MCHC RBC AUTO-ENTMCNC: 33.4 G/DL (ref 31.5–35.7)
MCV RBC AUTO: 91.5 FL (ref 79–97)
MONOCYTES # BLD AUTO: 0.5 10*3/MM3 (ref 0.1–0.9)
MONOCYTES NFR BLD AUTO: 5.1 % (ref 5–12)
NEUTROPHILS # BLD AUTO: 5.56 10*3/MM3 (ref 1.7–7)
NEUTROPHILS NFR BLD AUTO: 56.7 % (ref 42.7–76)
NRBC BLD AUTO-RTO: 0 /100 WBC (ref 0–0.2)
PLATELET # BLD AUTO: 530 10*3/MM3 (ref 140–450)
PMV BLD AUTO: 10.6 FL (ref 6–12)
POTASSIUM BLD-SCNC: 4.3 MMOL/L (ref 3.5–5.2)
PROT SERPL-MCNC: 7.3 G/DL (ref 6–8.5)
RBC # BLD AUTO: 3.86 10*6/MM3 (ref 4.14–5.8)
SODIUM BLD-SCNC: 139 MMOL/L (ref 136–145)
WBC NRBC COR # BLD: 9.8 10*3/MM3 (ref 3.4–10.8)

## 2020-06-11 PROCEDURE — 83615 LACTATE (LD) (LDH) ENZYME: CPT

## 2020-06-11 PROCEDURE — 36415 COLL VENOUS BLD VENIPUNCTURE: CPT | Performed by: INTERNAL MEDICINE

## 2020-06-11 PROCEDURE — 80053 COMPREHEN METABOLIC PANEL: CPT

## 2020-06-11 PROCEDURE — 85025 COMPLETE CBC W/AUTO DIFF WBC: CPT

## 2020-06-12 ENCOUNTER — TELEMEDICINE (OUTPATIENT)
Dept: ONCOLOGY | Facility: CLINIC | Age: 65
End: 2020-06-12

## 2020-06-12 DIAGNOSIS — D47.1 MPN (MYELOPROLIFERATIVE NEOPLASM) (HCC): Primary | ICD-10-CM

## 2020-06-12 DIAGNOSIS — D64.9 ANEMIA, UNSPECIFIED TYPE: ICD-10-CM

## 2020-06-12 DIAGNOSIS — D72.829 LEUKOCYTOSIS, UNSPECIFIED TYPE: ICD-10-CM

## 2020-06-12 DIAGNOSIS — D75.839 THROMBOCYTOSIS: ICD-10-CM

## 2020-06-12 PROCEDURE — 99214 OFFICE O/P EST MOD 30 MIN: CPT | Performed by: INTERNAL MEDICINE

## 2020-06-12 NOTE — PROGRESS NOTES
DATE OF VISIT: 6/12/2020    You have chosen to receive care through a telehealth visit.  Do you consent to use a video/audio connection for your medical care today? Yes       REASON FOR VISIT: Myeloproliferative neoplasm on hydroxyurea, anemia, thrombocytosis      HISTORY OF PRESENT ILLNESS:    65-year-old male with a medical problem consisting of recurrent sinus infection, hypertension who has been following up with [facility] since February 26, 2020 for evaluation of leukocytosis and thrombocytosis.  During work-up at [facility], patient was found to have myelodysplastic syndrome/ MPN overlap syndrome.  Patient has been started on hydroxyurea since March 28, 2020.  Due to worsening thrombocytosis, dose of hydroxyurea has been increased to 1500 mg daily on May 15, 2020.  Patient had blood work done recently, is here to discuss the results and further recommendation.  States his fatigue is improved.  Denies any bleeding.  Denies any recent infection.  Denies any new chest pain or shortness of breath.      PAST MEDICAL HISTORY:    Past Medical History:   Diagnosis Date   • Hepatitis B    • Hypertension    • Sinus infection        SOCIAL HISTORY:    Social History     Tobacco Use   • Smoking status: Former Smoker   • Smokeless tobacco: Never Used   • Tobacco comment: quit in early 21 years old   Substance Use Topics   • Alcohol use: Yes     Alcohol/week: 1.0 standard drinks     Types: 1 Cans of beer per week     Comment: occasionally   • Drug use: Never       Surgical History :  Past Surgical History:   Procedure Laterality Date   • COLONOSCOPY     • HEMORRHOID BANDING         ALLERGIES:    Allergies   Allergen Reactions   • Other Rash     Pt was working with walnut wood and broke out in a rash   • Tetracycline Rash         FAMILY HISTORY:  Family History   Problem Relation Age of Onset   • Diabetes Father    • Cirrhosis Father    • Cancer Sister    • Cancer Maternal Uncle            REVIEW OF SYSTEMS:       CONSTITUTIONAL:  Complains of fatigue.  Denies any worsening of night sweats.  Denies any fever, chills or weight loss.     EYES: No visual disturbances. No discharge. No new lesions    ENMT:  No epistaxis, mouth sores or difficulty swallowing.    RESPIRATORY:  No new shortness of breath. No new cough or hemoptysis.    CARDIOVASCULAR:  No chest pain or palpitations.    GASTROINTESTINAL:  No abdominal pain nausea, vomiting or blood in the stool.    GENITOURINARY: No Dysuria or Hematuria.    MUSCULOSKELETAL: Positive for arthritis pain.    LYMPHATICS:  Denies any abnormal swollen glands anywhere in the body.    NEUROLOGICAL : No tingling or numbness. No headache or dizziness. No seizures or balance problems.    SKIN: No new skin lesions.    ENDOCRINE : No new hot or cold intolerance. No new polyuria . No polydipsia.        PHYSICAL EXAMINATION:      VITAL SIGNS:  Afebrile(per patient), RR-16      ECOG performance status: 1    CONSTITUTIONAL:  Not in any distress.    EYES:  Extraocular Movements intact.No ptosis.    ENMT:  Normocephalic, Atraumatic.No Facial Asymmetry noted.    NECK:  No  visible adenopathy.    RESPIRATORY:  Fair respiratory effort.    MUSCULOSKELETAL:  Decreased range of motion.    NEUROLOGIC:    No  Motor  deficit appreciated.  Moving all 4 extremities appropriately.    SKIN : No new skin lesion lesions.    LYMPHATICS: No visible enlarged lymph nodes in neck or supraclavicular area.    PSYCHIATRY: Alert, awake and oriented ×3.        DIAGNOSTIC DATA:    Glucose   Date Value Ref Range Status   06/11/2020 113 (H) 65 - 99 mg/dL Final     Sodium   Date Value Ref Range Status   06/11/2020 139 136 - 145 mmol/L Final     Potassium   Date Value Ref Range Status   06/11/2020 4.3 3.5 - 5.2 mmol/L Final     CO2   Date Value Ref Range Status   06/11/2020 25.0 22.0 - 29.0 mmol/L Final     Chloride   Date Value Ref Range Status   06/11/2020 103 98 - 107 mmol/L Final     Anion Gap   Date Value Ref Range Status    06/11/2020 11.0 5.0 - 15.0 mmol/L Final     Creatinine   Date Value Ref Range Status   06/11/2020 0.79 0.76 - 1.27 mg/dL Final     BUN   Date Value Ref Range Status   06/11/2020 17 8 - 23 mg/dL Final     BUN/Creatinine Ratio   Date Value Ref Range Status   06/11/2020 21.5 7.0 - 25.0 Final     Calcium   Date Value Ref Range Status   06/11/2020 9.3 8.6 - 10.5 mg/dL Final     eGFR Non  Amer   Date Value Ref Range Status   06/11/2020 99 >60 mL/min/1.73 Final     Alkaline Phosphatase   Date Value Ref Range Status   06/11/2020 69 39 - 117 U/L Final     Total Protein   Date Value Ref Range Status   06/11/2020 7.3 6.0 - 8.5 g/dL Final     ALT (SGPT)   Date Value Ref Range Status   06/11/2020 35 1 - 41 U/L Final     AST (SGOT)   Date Value Ref Range Status   06/11/2020 26 1 - 40 U/L Final     Total Bilirubin   Date Value Ref Range Status   06/11/2020 0.9 0.2 - 1.2 mg/dL Final     Albumin   Date Value Ref Range Status   06/11/2020 4.80 3.50 - 5.20 g/dL Final     Globulin   Date Value Ref Range Status   06/11/2020 2.5 gm/dL Final     Lab Results   Component Value Date    WBC 9.80 06/11/2020    HGB 11.8 (L) 06/11/2020    HCT 35.3 (L) 06/11/2020    MCV 91.5 06/11/2020     (H) 06/11/2020     Lab Results   Component Value Date    NEUTROABS 5.56 06/11/2020    IRON 109 05/28/2020    TIBC 301 05/28/2020    LABIRON 36 05/28/2020    FERRITIN 687.00 (H) 05/28/2020    [id number] >2,000 (H) 05/28/2020    FOLATE 11.70 05/28/2020     No results found for: , LABCA2, AFPTM, HCGQUANT, , CHROMGRNA, [id number], CEA, REFLABREPO        PATHOLOGY DATA:     Pathology report from March 23, 2020 at [place] showed:     Diagnosis:  BONE MARROW - PERIPHERAL BLOOD SMEAR, ASPIRATE SMEAR, PARTICLE PREPARATION, BIOPSY AND FLOW CYTOMETRY: MYELOID NEOPLASM WITH ABNORMAL MEGAKARYOCYTES AND RING SIDEROBLASTS; NO INCREASE IN BLASTS; SEE IMPRESSION     IMPRESSION: This is a markedly hypercellular marrow (>90%) with hyperplastic  trilineage hematopoiesis and increased abnormal megakaryocytes with clustering. There is mild erythroid atypia and increased ring sideroblasts (15-20%) although this number may not be representative given the non-particulate and hemodiluted aspirate. There is no increase in blasts by flow cytometry on the hemodiluted aspirate and by immunohistochemistry performed on the biopsy. The marked peripheral thrombocytosis and mild neutrophilia is noted. There is no peripheral eosinophilia but there is mild increase in marrow eosinophils based on the biopsy sections. Overall, this constellation of findings is consistent with myeloid neoplasm with ring sideroblasts but no increase in myeloblasts. The features raise a possibility of myelodysplastic/myeloproliferative neoplasm with ring sideroblasts and thrombocytosis (MDS/MPN-RS-T). However the features may also be consistent with one of the specific subtypes of MPN if   the presence of ring sideroblasts can be attributed to a secondary cause such as toxic or certain drug exposures. Additional studies may help further characterize this process therefore final diagnosis depends on correlation with pending additional testing, as documented below, which will be included in the comprehensive hematopathology report.          ASSESSMENT AND PLAN:      1. Leukocytosis and thrombocytosis and anemia due to myeloproliferative neoplasm/MDS overlap syndrome:  - Patient has been started on hydroxyurea since March 28, 2020.  - Due to worsening thrombocytosis, dose of hydroxyurea was increased to 1500 mg daily on May 15, 2020.  - CBC done on June 11, 2020 shows white blood cell count is 9.8, hemoglobin is 11.8, platelet count is improved to 530,000.  -At this point will continue with hydroxyurea 1500 mg p.o. daily.  - We will ask patient to return to clinic in 2 weeks with a repeat CBC to be done prior to that day.    2.  Hypertension    3.  Health maintenance: Patient does not smoke.    4.   Prescription:  -Patient has enough prescription for eyedrops.    5. Advance Care Planning: For now patient remains full code and is able to make decisions.  Patient has health care surrogate mentioned on chart.         PHQ-9 Total Score:       Marv Messina reports a pain score of 0.  Given his pain assessment as noted, treatment options were discussed and the following options were decided upon as a follow-up plan to address the patient's pain: No intervention required.               This visit was completed as a video visit due to ongoing pandemic with coronavirus.    I did not complete this video visit with the patient using watAgame.  Video visit was completed through third-party video application.      Vadim Song MD  6/12/2020  14:17          Part of this note may be an electronic transcription/translation of spoken language to printed text using the Dragon Dictation System.

## 2020-06-23 ENCOUNTER — LAB (OUTPATIENT)
Dept: ONCOLOGY | Facility: HOSPITAL | Age: 65
End: 2020-06-23

## 2020-06-23 DIAGNOSIS — D47.1 MPN (MYELOPROLIFERATIVE NEOPLASM) (HCC): ICD-10-CM

## 2020-06-23 DIAGNOSIS — D75.839 THROMBOCYTOSIS: ICD-10-CM

## 2020-06-23 DIAGNOSIS — D72.829 LEUKOCYTOSIS, UNSPECIFIED TYPE: ICD-10-CM

## 2020-06-23 DIAGNOSIS — D64.9 ANEMIA, UNSPECIFIED TYPE: ICD-10-CM

## 2020-06-23 LAB
ANISOCYTOSIS BLD QL: ABNORMAL
DEPRECATED RDW RBC AUTO: 94 FL (ref 37–54)
EOSINOPHIL # BLD MANUAL: 0.07 10*3/MM3 (ref 0–0.4)
EOSINOPHIL NFR BLD MANUAL: 1 % (ref 0.3–6.2)
ERYTHROCYTE [DISTWIDTH] IN BLOOD BY AUTOMATED COUNT: 28.1 % (ref 12.3–15.4)
HCT VFR BLD AUTO: 31 % (ref 37.5–51)
HGB BLD-MCNC: 10.4 G/DL (ref 13–17.7)
HYPOCHROMIA BLD QL: ABNORMAL
LYMPHOCYTES # BLD MANUAL: 1.77 10*3/MM3 (ref 0.7–3.1)
LYMPHOCYTES NFR BLD MANUAL: 27 % (ref 19.6–45.3)
LYMPHOCYTES NFR BLD MANUAL: 4 % (ref 5–12)
MCH RBC QN AUTO: 31.1 PG (ref 26.6–33)
MCHC RBC AUTO-ENTMCNC: 33.5 G/DL (ref 31.5–35.7)
MCV RBC AUTO: 92.8 FL (ref 79–97)
MONOCYTES # BLD AUTO: 0.26 10*3/MM3 (ref 0.1–0.9)
NEUTROPHILS # BLD AUTO: 4.32 10*3/MM3 (ref 1.7–7)
NEUTROPHILS NFR BLD MANUAL: 66 % (ref 42.7–76)
OVALOCYTES BLD QL SMEAR: ABNORMAL
PLATELET # BLD AUTO: 794 10*3/MM3 (ref 140–450)
PMV BLD AUTO: 10.3 FL (ref 6–12)
RBC # BLD AUTO: 3.34 10*6/MM3 (ref 4.14–5.8)
SMALL PLATELETS BLD QL SMEAR: ABNORMAL
VARIANT LYMPHS NFR BLD MANUAL: 2 % (ref 0–5)
WBC MORPH BLD: NORMAL
WBC NRBC COR # BLD: 6.55 10*3/MM3 (ref 3.4–10.8)

## 2020-06-23 PROCEDURE — 36415 COLL VENOUS BLD VENIPUNCTURE: CPT | Performed by: INTERNAL MEDICINE

## 2020-06-23 PROCEDURE — 85025 COMPLETE CBC W/AUTO DIFF WBC: CPT

## 2020-06-23 PROCEDURE — 85007 BL SMEAR W/DIFF WBC COUNT: CPT

## 2020-06-26 ENCOUNTER — TELEMEDICINE (OUTPATIENT)
Dept: ONCOLOGY | Facility: CLINIC | Age: 65
End: 2020-06-26

## 2020-06-26 DIAGNOSIS — D47.1 MPN (MYELOPROLIFERATIVE NEOPLASM) (HCC): ICD-10-CM

## 2020-06-26 DIAGNOSIS — Z51.11 ENCOUNTER FOR ANTINEOPLASTIC CHEMOTHERAPY: Primary | ICD-10-CM

## 2020-06-26 DIAGNOSIS — D64.9 ANEMIA, UNSPECIFIED TYPE: ICD-10-CM

## 2020-06-26 DIAGNOSIS — D75.839 THROMBOCYTOSIS: ICD-10-CM

## 2020-06-26 PROCEDURE — 99214 OFFICE O/P EST MOD 30 MIN: CPT | Performed by: INTERNAL MEDICINE

## 2020-06-26 RX ORDER — ANAGRELIDE 0.5 MG/1
1 CAPSULE ORAL 2 TIMES DAILY
Qty: 120 CAPSULE | Refills: 1 | Status: SHIPPED | OUTPATIENT
Start: 2020-06-26 | End: 2020-07-11

## 2020-06-28 NOTE — PROGRESS NOTES
DATE OF VISIT: 6/28/2020    You have chosen to receive care through a telehealth visit.  Do you consent to use a video/audio connection for your medical care today? 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  Currently patient is on hydroxyurea 1500 mg p.o. daily since May 15, 2020.  He had a blood work done recently, is here to discuss the results and further recommendation.  Complains of worsening fatigue.  Denies any skin ulceration.  Denies any nausea or vomiting or diarrhea.  Denies any new chest pain or shortness of breath.          PAST MEDICAL HISTORY:    Past Medical History:   Diagnosis Date   • Hepatitis B    • Hypertension    • Sinus infection        SOCIAL HISTORY:    Social History     Tobacco Use   • Smoking status: Former Smoker   • Smokeless tobacco: Never Used   • Tobacco comment: quit in early 21 years old   Substance Use Topics   • Alcohol use: Yes     Alcohol/week: 1.0 standard drinks     Types: 1 Cans of beer per week     Comment: occasionally   • Drug use: Never       Surgical History :  Past Surgical History:   Procedure Laterality Date   • COLONOSCOPY     • HEMORRHOID BANDING         ALLERGIES:    Allergies   Allergen Reactions   • Other Rash     Pt was working with walnut wood and broke out in a rash   • Tetracycline Rash         FAMILY HISTORY:  Family History   Problem Relation Age of Onset   • Diabetes Father    • Cirrhosis Father    • Cancer Sister    • Cancer Maternal Uncle            REVIEW OF SYSTEMS:      CONSTITUTIONAL: Positive for recent worsening of fatigue. Denies any fever,  chills or weight loss.     EYES: No visual disturbances. No discharge. No new lesions    ENMT:  No epistaxis, mouth sores or difficulty swallowing.    RESPIRATORY:  No new shortness of breath. No new cough or hemoptysis.    CARDIOVASCULAR:  No chest pain or palpitations.    GASTROINTESTINAL:  No abdominal pain nausea, vomiting or blood in the stool.    GENITOURINARY: No Dysuria or Hematuria.    MUSCULOSKELETAL: Positive for chronic arthritis pain affecting elbow and right shoulder    LYMPHATICS:  Denies any abnormal swollen glands anywhere in the body.    NEUROLOGICAL : No tingling or numbness. No headache or dizziness. No seizures or balance problems.    SKIN: No new skin lesions.    ENDOCRINE : No new hot or cold intolerance. No new polyuria . No polydipsia.        PHYSICAL EXAMINATION:      Unable to perform due to video connection issue      DIAGNOSTIC DATA:    Glucose   Date Value Ref Range Status   06/11/2020 113 (H) 65 - 99 mg/dL Final     Sodium   Date Value Ref Range Status   06/11/2020 139 136 - 145 mmol/L Final     Potassium   Date Value Ref Range Status   06/11/2020 4.3 3.5 - 5.2 mmol/L Final     CO2   Date Value Ref Range Status   06/11/2020 25.0 22.0 - 29.0 mmol/L Final     Chloride   Date Value Ref Range Status   06/11/2020 103 98 - 107 mmol/L Final     Anion Gap   Date Value Ref Range Status   06/11/2020 11.0 5.0 - 15.0 mmol/L Final     Creatinine   Date Value Ref Range Status   06/11/2020 0.79 0.76 - 1.27 mg/dL Final     BUN   Date Value Ref Range Status   06/11/2020 17 8 - 23 mg/dL Final     BUN/Creatinine Ratio   Date Value Ref Range Status   06/11/2020 21.5 7.0 - 25.0 Final     Calcium   Date Value Ref Range Status   06/11/2020 9.3 8.6 - 10.5 mg/dL Final     eGFR Non  Amer   Date Value Ref Range Status   06/11/2020 99 >60 mL/min/1.73 Final     Alkaline Phosphatase   Date Value Ref Range Status   06/11/2020 69 39 - 117 U/L Final     Total Protein   Date Value Ref Range Status   06/11/2020  7.3 6.0 - 8.5 g/dL Final     ALT (SGPT)   Date Value Ref Range Status   06/11/2020 35 1 - 41 U/L Final     AST (SGOT)   Date Value Ref Range Status   06/11/2020 26 1 - 40 U/L Final     Total Bilirubin   Date Value Ref Range Status   06/11/2020 0.9 0.2 - 1.2 mg/dL Final     Albumin   Date Value Ref Range Status   06/11/2020 4.80 3.50 - 5.20 g/dL Final     Globulin   Date Value Ref Range Status   06/11/2020 2.5 gm/dL Final     Lab Results   Component Value Date    WBC 6.55 06/23/2020    HGB 10.4 (L) 06/23/2020    HCT 31.0 (L) 06/23/2020    MCV 92.8 06/23/2020     (H) 06/23/2020     Lab Results   Component Value Date    NEUTROABS 4.32 06/23/2020    IRON 109 05/28/2020    TIBC 301 05/28/2020    LABIRON 36 05/28/2020    FERRITIN 687.00 (H) 05/28/2020    VPIMRJVY43 >2,000 (H) 05/28/2020    FOLATE 11.70 05/28/2020     No results found for: , LABCA2, AFPTM, HCGQUANT, , CHROMGRNA, [id number], CEA, REFLABREPO        PATHOLOGY:  Pathology report from March 23, 2020 at [facility] showed:     Diagnosis:  BONE MARROW - PERIPHERAL BLOOD SMEAR, ASPIRATE SMEAR, PARTICLE PREPARATION, BIOPSY AND FLOW CYTOMETRY: MYELOID NEOPLASM WITH ABNORMAL MEGAKARYOCYTES AND RING SIDEROBLASTS; NO INCREASE IN BLASTS; SEE IMPRESSION     IMPRESSION: This is a markedly hypercellular marrow (>90%) with hyperplastic trilineage hematopoiesis and increased abnormal megakaryocytes with clustering. There is mild erythroid atypia and increased ring sideroblasts (15-20%) although this number may not be representative given the non-particulate and hemodiluted aspirate. There is no increase in blasts by flow cytometry on the hemodiluted aspirate and by immunohistochemistry performed on the biopsy. The marked peripheral thrombocytosis and mild neutrophilia is noted. There is no peripheral eosinophilia but there is mild increase in marrow eosinophils based on the biopsy sections. Overall, this constellation of findings is consistent with myeloid  neoplasm with ring sideroblasts but no increase in myeloblasts. The features raise a possibility of myelodysplastic/myeloproliferative neoplasm with ring sideroblasts and thrombocytosis (MDS/MPN-RS-T). However the features may also be consistent with one of the specific subtypes of MPN if   the presence of ring sideroblasts can be attributed to a secondary cause such as toxic or certain drug exposures. Additional studies may help further characterize this process therefore final diagnosis depends on correlation with pending additional testing, as documented below, which will be included in the comprehensive hematopathology report.            ASSESSMENT AND PLAN:      1.  Myeloproliferative neoplasm/MDS overlap syndrome:  - Bone marrow biopsy done at [facility] on March 23, 2020 was consistent with MPN/MDS overlap syndrome.  - Patient was started on Hydrea 500 mg p.o. daily on March 20, 2020.  -Dose of hydroxyurea was increased to 5 mg twice daily on April 3, 2020.  - Due to worsening thrombocytosis: Patient's dose of hydroxyurea was increased to 1500 mg p.o. daily on May 15, 2020.  -Recent CBC done on June 23, 2020 shows white blood cell count is 6.55, hemoglobin is decreased to 10.4, platelet count is increased to 794,000.  - Due to worsening anemia and rising thrombocyte count, recommend changing treatment to anagrelide.  - Side effect of anagrelide including but not limited to risk of bleeding, risk of pulmonary fibrosis, risk of cardiac arrhythmia, worsening heart function, headaches, myalgias, GI side effect, worsening liver enzymes were discussed with patient today.  We will provide him information to read about anagrelide.  -We will get baseline chest x-ray, baseline ECG as well as 2D echo next week.  - Plan is to start him on anagrelide 1 mg twice daily after getting baseline ECG, chest x-ray and 2D echo.  -Patient was evaluated by Dr. Arredondo at [facility] via telemedicine on June 22, 2020.  Patient is  going to discuss with Dr. Arredondo regarding further treatment recommendation as well.  - We will ask patient to return to clinic in 3 weeks with repeat CBC CMP on that day.  - For now we will ask him to reduce hydroxyurea to 500 mg twice daily.  Will discontinue hydroxyurea prior to starting anagrelide.      2.  Anemia and thrombocytosis:  -Secondary to overlap MDS/MPN syndrome.  - We will decrease dose of hydroxyurea and continue with clinical monitoring.    3.  Health maintenance: Patient does not smoke.  Had a colonoscopy in 2001 in [place].    4. Advance Care Planning: For now patient remains full code and is able to make decisions.  Patient has health care surrogate mentioned on chart.      PHQ-9 Total Score:       Marv Messina reports a pain score of 0.  Given his pain assessment as noted, treatment options were discussed and the following options were decided upon as a follow-up plan to address the patient's pain: No acute intervention required.               This visit was completed as a video visit due to ongoing pandemic with coronavirus.    Unable to complete visit using a video connection to the patient. A phone visit was used to complete this visits. Total time of discussion was  20  minutes.      Vadim Song MD  6/28/2020  12:55          Part of this note may be an electronic transcription/translation of spoken language to printed text using the Dragon Dictation System.

## 2020-07-01 ENCOUNTER — HOSPITAL ENCOUNTER (OUTPATIENT)
Dept: GENERAL RADIOLOGY | Facility: HOSPITAL | Age: 65
Discharge: HOME | End: 2020-07-01
Admitting: INTERNAL MEDICINE

## 2020-07-01 DIAGNOSIS — Z51.11 ENCOUNTER FOR ANTINEOPLASTIC CHEMOTHERAPY: ICD-10-CM

## 2020-07-01 PROCEDURE — 71046 X-RAY EXAM CHEST 2 VIEWS: CPT

## 2020-07-06 ENCOUNTER — TELEPHONE (OUTPATIENT)
Dept: ONCOLOGY | Facility: CLINIC | Age: 65
End: 2020-07-06

## 2020-07-06 NOTE — TELEPHONE ENCOUNTER
----- Message from Vadim Song MD sent at 7/6/2020  8:59 AM CDT -----  Regarding: FW: Prescription Question  Contact: [phone]  Anagrelide can cause some cardiac arrhythmia.  Recommend stopping chemotherapy with anagrelide for now.  Recommend following up with Dr. Arredondo at [facility] for further recommendation.  Thank you    ----- Message -----  From: Nova Herrera RN  Sent: 7/6/2020   8:44 AM CDT  To: Vadim Song MD  Subject: FW: Prescription Question                        FYI from pt    ----- Message -----  From: Marv Messina  Sent: 7/5/2020   9:03 AM CDT  To: [facility]  Subject: Prescription Question                            Dr. Song,  I took my 4th dose of pills at about 5:45pm Saturday July 4.  About an hour after taking them my heart started beating hard and fast.  I did become somewhat weak and I laid down to try and calm this effect which was quit frightening.  This did subside in about an hour and a half and became normal once again.    Up to that point I did not feel any effects from the chemo pills.  At this time I do not know if I should continue taking these pills.  I thought I would reduce the dosage to one today to see if this reoccurs again or until I hear from you about what happened and if I should continue the pills.  Marv Messina

## 2020-07-11 ENCOUNTER — APPOINTMENT (OUTPATIENT)
Dept: GENERAL RADIOLOGY | Facility: HOSPITAL | Age: 65
End: 2020-07-11

## 2020-07-11 ENCOUNTER — HOSPITAL ENCOUNTER (OUTPATIENT)
Facility: HOSPITAL | Age: 65
Setting detail: OBSERVATION
Discharge: HOME | End: 2020-07-12
Attending: STUDENT IN AN ORGANIZED HEALTH CARE EDUCATION/TRAINING PROGRAM | Admitting: FAMILY MEDICINE

## 2020-07-11 DIAGNOSIS — D46.9 MYELODYSPLASTIC SYNDROME (HCC): ICD-10-CM

## 2020-07-11 DIAGNOSIS — R00.2 HEART PALPITATIONS: ICD-10-CM

## 2020-07-11 DIAGNOSIS — R07.9 CHEST PAIN, UNSPECIFIED TYPE: Primary | ICD-10-CM

## 2020-07-11 LAB
ALBUMIN SERPL-MCNC: 4.8 G/DL (ref 3.5–5.2)
ALBUMIN/GLOB SERPL: 2.4 G/DL
ALP SERPL-CCNC: 51 U/L (ref 39–117)
ALT SERPL W P-5'-P-CCNC: 23 U/L (ref 1–41)
ANION GAP SERPL CALCULATED.3IONS-SCNC: 10 MMOL/L (ref 5–15)
ANISOCYTOSIS BLD QL: ABNORMAL
APTT PPP: 35.4 SECONDS (ref 20–40.3)
AST SERPL-CCNC: 23 U/L (ref 1–40)
BASO STIPL COARSE BLD QL SMEAR: ABNORMAL
BASOPHILS # BLD MANUAL: 0.23 10*3/MM3 (ref 0–0.2)
BASOPHILS NFR BLD AUTO: 3 % (ref 0–1.5)
BILIRUB SERPL-MCNC: 1.1 MG/DL (ref 0–1.2)
BUN SERPL-MCNC: 12 MG/DL (ref 8–23)
BUN/CREAT SERPL: 13.6 (ref 7–25)
CALCIUM SPEC-SCNC: 9.3 MG/DL (ref 8.6–10.5)
CHLORIDE SERPL-SCNC: 104 MMOL/L (ref 98–107)
CO2 SERPL-SCNC: 25 MMOL/L (ref 22–29)
CREAT SERPL-MCNC: 0.88 MG/DL (ref 0.76–1.27)
D-DIMER, QUANTITATIVE (MAD,POW, STR): <270 NG/ML (FEU) (ref 0–470)
DACRYOCYTES BLD QL SMEAR: ABNORMAL
DEPRECATED RDW RBC AUTO: 106.3 FL (ref 37–54)
EOSINOPHIL # BLD MANUAL: 0.16 10*3/MM3 (ref 0–0.4)
EOSINOPHIL NFR BLD MANUAL: 2 % (ref 0.3–6.2)
ERYTHROCYTE [DISTWIDTH] IN BLOOD BY AUTOMATED COUNT: 29.3 % (ref 12.3–15.4)
GFR SERPL CREATININE-BSD FRML MDRD: 87 ML/MIN/1.73
GLOBULIN UR ELPH-MCNC: 2 GM/DL
GLUCOSE SERPL-MCNC: 101 MG/DL (ref 65–99)
HCT VFR BLD AUTO: 33 % (ref 37.5–51)
HGB BLD-MCNC: 11.1 G/DL (ref 13–17.7)
HOLD SPECIMEN: NORMAL
HOLD SPECIMEN: NORMAL
HYPOCHROMIA BLD QL: ABNORMAL
INR PPP: 1.16 (ref 0.8–1.2)
LARGE PLATELETS: ABNORMAL
LYMPHOCYTES # BLD MANUAL: 2.18 10*3/MM3 (ref 0.7–3.1)
LYMPHOCYTES NFR BLD MANUAL: 12 % (ref 5–12)
LYMPHOCYTES NFR BLD MANUAL: 28 % (ref 19.6–45.3)
MAGNESIUM SERPL-MCNC: 2.2 MG/DL (ref 1.6–2.4)
MCH RBC QN AUTO: 33 PG (ref 26.6–33)
MCHC RBC AUTO-ENTMCNC: 33.6 G/DL (ref 31.5–35.7)
MCV RBC AUTO: 98.2 FL (ref 79–97)
MONOCYTES # BLD AUTO: 0.93 10*3/MM3 (ref 0.1–0.9)
NEUTROPHILS # BLD AUTO: 4.28 10*3/MM3 (ref 1.7–7)
NEUTROPHILS NFR BLD MANUAL: 50 % (ref 42.7–76)
NEUTS BAND NFR BLD MANUAL: 5 % (ref 0–5)
NT-PROBNP SERPL-MCNC: 86 PG/ML (ref 0–900)
PLATELET # BLD AUTO: 692 10*3/MM3 (ref 140–450)
PMV BLD AUTO: 10.3 FL (ref 6–12)
POTASSIUM SERPL-SCNC: 3.8 MMOL/L (ref 3.5–5.2)
PROT SERPL-MCNC: 6.8 G/DL (ref 6–8.5)
PROTHROMBIN TIME: 15.3 SECONDS (ref 11.1–15.3)
RBC # BLD AUTO: 3.36 10*6/MM3 (ref 4.14–5.8)
SCHISTOCYTES BLD QL SMEAR: ABNORMAL
SMALL PLATELETS BLD QL SMEAR: ABNORMAL
SODIUM SERPL-SCNC: 139 MMOL/L (ref 136–145)
T4 FREE SERPL-MCNC: 1.37 NG/DL (ref 0.93–1.7)
TROPONIN T SERPL-MCNC: <0.01 NG/ML (ref 0–0.03)
TSH SERPL DL<=0.05 MIU/L-ACNC: 1.25 UIU/ML (ref 0.27–4.2)
WBC # BLD AUTO: 7.78 10*3/MM3 (ref 3.4–10.8)
WBC MORPH BLD: NORMAL
WHOLE BLOOD HOLD SPECIMEN: NORMAL
WHOLE BLOOD HOLD SPECIMEN: NORMAL

## 2020-07-11 PROCEDURE — 85007 BL SMEAR W/DIFF WBC COUNT: CPT | Performed by: EMERGENCY MEDICINE

## 2020-07-11 PROCEDURE — 85025 COMPLETE CBC W/AUTO DIFF WBC: CPT | Performed by: EMERGENCY MEDICINE

## 2020-07-11 PROCEDURE — 96361 HYDRATE IV INFUSION ADD-ON: CPT

## 2020-07-11 PROCEDURE — 85379 FIBRIN DEGRADATION QUANT: CPT | Performed by: EMERGENCY MEDICINE

## 2020-07-11 PROCEDURE — 84484 ASSAY OF TROPONIN QUANT: CPT | Performed by: FAMILY MEDICINE

## 2020-07-11 PROCEDURE — 85610 PROTHROMBIN TIME: CPT | Performed by: EMERGENCY MEDICINE

## 2020-07-11 PROCEDURE — 84443 ASSAY THYROID STIM HORMONE: CPT | Performed by: EMERGENCY MEDICINE

## 2020-07-11 PROCEDURE — 99284 EMERGENCY DEPT VISIT MOD MDM: CPT

## 2020-07-11 PROCEDURE — 71045 X-RAY EXAM CHEST 1 VIEW: CPT

## 2020-07-11 PROCEDURE — G0378 HOSPITAL OBSERVATION PER HR: HCPCS

## 2020-07-11 PROCEDURE — 85730 THROMBOPLASTIN TIME PARTIAL: CPT | Performed by: EMERGENCY MEDICINE

## 2020-07-11 PROCEDURE — 83735 ASSAY OF MAGNESIUM: CPT | Performed by: EMERGENCY MEDICINE

## 2020-07-11 PROCEDURE — 84484 ASSAY OF TROPONIN QUANT: CPT | Performed by: EMERGENCY MEDICINE

## 2020-07-11 PROCEDURE — 83880 ASSAY OF NATRIURETIC PEPTIDE: CPT | Performed by: EMERGENCY MEDICINE

## 2020-07-11 PROCEDURE — 93005 ELECTROCARDIOGRAM TRACING: CPT | Performed by: STUDENT IN AN ORGANIZED HEALTH CARE EDUCATION/TRAINING PROGRAM

## 2020-07-11 PROCEDURE — 93010 ELECTROCARDIOGRAM REPORT: CPT | Performed by: INTERNAL MEDICINE

## 2020-07-11 PROCEDURE — 99220 PR INITIAL OBSERVATION CARE/DAY 70 MINUTES: CPT | Performed by: STUDENT IN AN ORGANIZED HEALTH CARE EDUCATION/TRAINING PROGRAM

## 2020-07-11 PROCEDURE — 80053 COMPREHEN METABOLIC PANEL: CPT | Performed by: EMERGENCY MEDICINE

## 2020-07-11 PROCEDURE — 84439 ASSAY OF FREE THYROXINE: CPT | Performed by: EMERGENCY MEDICINE

## 2020-07-11 RX ORDER — FOLIC ACID 1 MG/1
1 TABLET ORAL DAILY
Status: DISCONTINUED | OUTPATIENT
Start: 2020-07-11 | End: 2020-07-12 | Stop reason: HOSPADM

## 2020-07-11 RX ORDER — SODIUM CHLORIDE 0.9 % (FLUSH) 0.9 %
10 SYRINGE (ML) INJECTION AS NEEDED
Status: DISCONTINUED | OUTPATIENT
Start: 2020-07-11 | End: 2020-07-12 | Stop reason: HOSPADM

## 2020-07-11 RX ORDER — HYDROXYUREA 500 MG/1
1000 CAPSULE ORAL NIGHTLY
Status: DISCONTINUED | OUTPATIENT
Start: 2020-07-11 | End: 2020-07-11

## 2020-07-11 RX ORDER — ASPIRIN 81 MG/1
324 TABLET, CHEWABLE ORAL ONCE
Status: COMPLETED | OUTPATIENT
Start: 2020-07-11 | End: 2020-07-11

## 2020-07-11 RX ORDER — ONDANSETRON 2 MG/ML
4 INJECTION INTRAMUSCULAR; INTRAVENOUS EVERY 6 HOURS PRN
Status: DISCONTINUED | OUTPATIENT
Start: 2020-07-11 | End: 2020-07-12 | Stop reason: HOSPADM

## 2020-07-11 RX ORDER — ONDANSETRON 4 MG/1
4 TABLET, FILM COATED ORAL EVERY 6 HOURS PRN
Status: DISCONTINUED | OUTPATIENT
Start: 2020-07-11 | End: 2020-07-12 | Stop reason: HOSPADM

## 2020-07-11 RX ORDER — SODIUM CHLORIDE 9 MG/ML
125 INJECTION, SOLUTION INTRAVENOUS CONTINUOUS
Status: DISCONTINUED | OUTPATIENT
Start: 2020-07-11 | End: 2020-07-11

## 2020-07-11 RX ORDER — LANOLIN ALCOHOL/MO/W.PET/CERES
5 CREAM (GRAM) TOPICAL NIGHTLY PRN
Status: DISCONTINUED | OUTPATIENT
Start: 2020-07-11 | End: 2020-07-12 | Stop reason: HOSPADM

## 2020-07-11 RX ORDER — SODIUM CHLORIDE 0.9 % (FLUSH) 0.9 %
10 SYRINGE (ML) INJECTION EVERY 12 HOURS SCHEDULED
Status: DISCONTINUED | OUTPATIENT
Start: 2020-07-11 | End: 2020-07-12 | Stop reason: HOSPADM

## 2020-07-11 RX ORDER — MORPHINE SULFATE 2 MG/ML
1 INJECTION, SOLUTION INTRAMUSCULAR; INTRAVENOUS EVERY 4 HOURS PRN
Status: DISCONTINUED | OUTPATIENT
Start: 2020-07-11 | End: 2020-07-12 | Stop reason: HOSPADM

## 2020-07-11 RX ORDER — SODIUM CHLORIDE 9 MG/ML
100 INJECTION, SOLUTION INTRAVENOUS CONTINUOUS
Status: DISCONTINUED | OUTPATIENT
Start: 2020-07-11 | End: 2020-07-12 | Stop reason: HOSPADM

## 2020-07-11 RX ORDER — HYDROXYUREA 500 MG/1
500 CAPSULE ORAL DAILY
Status: DISCONTINUED | OUTPATIENT
Start: 2020-07-12 | End: 2020-07-11

## 2020-07-11 RX ORDER — ACETAMINOPHEN 325 MG/1
650 TABLET ORAL EVERY 4 HOURS PRN
Status: DISCONTINUED | OUTPATIENT
Start: 2020-07-11 | End: 2020-07-12 | Stop reason: HOSPADM

## 2020-07-11 RX ORDER — NALOXONE HCL 0.4 MG/ML
0.4 VIAL (ML) INJECTION
Status: DISCONTINUED | OUTPATIENT
Start: 2020-07-11 | End: 2020-07-12 | Stop reason: HOSPADM

## 2020-07-11 RX ORDER — HYDROXYUREA 500 MG/1
500 CAPSULE ORAL 2 TIMES DAILY
Status: DISCONTINUED | OUTPATIENT
Start: 2020-07-11 | End: 2020-07-12 | Stop reason: HOSPADM

## 2020-07-11 RX ADMIN — SODIUM CHLORIDE, PRESERVATIVE FREE 10 ML: 5 INJECTION INTRAVENOUS at 21:03

## 2020-07-11 RX ADMIN — HYDROXYUREA 500 MG: 500 CAPSULE ORAL at 21:02

## 2020-07-11 RX ADMIN — SODIUM CHLORIDE 100 ML/HR: 9 INJECTION, SOLUTION INTRAVENOUS at 21:04

## 2020-07-11 RX ADMIN — ASPIRIN 81 MG 324 MG: 81 TABLET ORAL at 16:47

## 2020-07-11 RX ADMIN — SODIUM CHLORIDE 125 ML/HR: 900 INJECTION, SOLUTION INTRAVENOUS at 16:10

## 2020-07-11 RX ADMIN — MELATONIN 5.25 MG: at 21:03

## 2020-07-11 NOTE — H&P
HISTORY AND PHYSICAL  NAME: Marv Messina  : 1955  MRN: [id number]    DATE OF ADMISSION: 20    DATE & TIME SEEN: 20 5:48 PM    PCP: Provider, No Known    CODE STATUS: Full code    CHIEF COMPLAINT Chest Pain     HPI:  Marv Messina is a 65 y.o. male with a CMH of hypertension and myeloproliferative neoplasm who presents complaining of 1 day history of substernal chest pressure without radiation.  Patient rated pressure at a 2-3 out of 10.  Associated with occasional palpitations and increased anxiety.  Patient endorsed that he has had increased anxiety over the past few days.  At time of interview, patient denied chest pain, chest pressure, palpitations, shortness of air, fever, chills, nausea/vomiting or  diarrhea.    Of note: Patient voiced desire to be a   Full code  Patient nominated Smita Messina  to be their Healthcare surrogate,  contact information is [phone]    CONCURRENT MEDICAL HISTORY:  Past Medical History:   Diagnosis Date   • Hepatitis B    • Hypertension    • MPN (myeloproliferative neoplasm) (CMS/HCC)    • Sinus infection        PAST SURGICAL HISTORY:  Past Surgical History:   Procedure Laterality Date   • COLONOSCOPY     • HEMORRHOID BANDING         FAMILY HISTORY:  Family History   Problem Relation Age of Onset   • Diabetes Father    • Cirrhosis Father    • Cancer Sister    • Cancer Maternal Uncle         SOCIAL HISTORY:  Social History     Socioeconomic History   • Marital status:      Spouse name: Not on file   • Number of children: Not on file   • Years of education: Not on file   • Highest education level: Not on file   Tobacco Use   • Smoking status: Former Smoker   • Smokeless tobacco: Never Used   • Tobacco comment: quit in early 21 years old   Substance and Sexual Activity   • Alcohol use: Yes     Alcohol/week: 1.0 standard drinks     Types: 1 Cans of beer per week     Comment: occasionally   • Drug use: Never   • Sexual activity: Defer        HOME MEDICATIONS:  Prior to Admission medications    Medication Sig Start Date End Date Taking? Authorizing Provider   anagrelide (AGRYLIN) 0.5 MG capsule Take 2 capsules by mouth 2 (Two) Times a Day. 6/26/20   Vadim Song MD   docusate sodium (COLACE) 100 MG capsule Take 100 mg by mouth 2 (Two) Times a Day As Needed.    ProviderVicente MD   folic acid (FOLVITE) 1 MG tablet 1 tablet by mouth on Monday, Wednesday and Friday 5/29/20   Vadim Song MD   hydroxyurea (HYDREA) 500 MG capsule Take 1 capsule in the morning and 2 capsules at night 6/2/20   Vadim Song MD   NON FORMULARY Daily. kiolic garlic    Provider, MD Vicente       ALLERGIES:  Other and Tetracycline    REVIEW OF SYSTEMS  Review of Systems   Constitutional: Negative for activity change, appetite change, chills, fatigue and fever.   HENT: Negative for congestion, hearing loss, sinus pressure, sinus pain, sneezing, sore throat and trouble swallowing.    Eyes: Negative for pain, discharge and itching.   Respiratory: Negative for apnea, cough, choking, chest tightness, shortness of breath, wheezing and stridor.    Cardiovascular: Negative for chest pain, palpitations and leg swelling.   Gastrointestinal: Negative for abdominal distention, abdominal pain, anal bleeding, constipation, diarrhea, nausea and vomiting.   Genitourinary: Negative for difficulty urinating, dysuria, enuresis and flank pain.   Musculoskeletal: Negative for arthralgias, back pain and gait problem.   Skin: Negative for color change.   Neurological: Negative for dizziness, seizures, syncope, facial asymmetry, light-headedness, numbness and headaches.   Psychiatric/Behavioral: Negative for agitation and behavioral problems.       PHYSICAL EXAM:  Temp:  [96.7 °F (35.9 °C)-98.2 °F (36.8 °C)] 98.2 °F (36.8 °C)  Heart Rate:  [] 77  Resp:  [16-20] 18  BP: (118-169)/(59-80) 118/59  Body mass index is 27.12 kg/m².  Physical Exam   Constitutional: He is oriented to  person, place, and time. He appears well-developed and well-nourished. No distress.   HENT:   Head: Normocephalic and atraumatic.   Right Ear: External ear normal.   Left Ear: External ear normal.   Nose: Nose normal.   Mouth/Throat: Oropharynx is clear and moist.   Eyes: Pupils are equal, round, and reactive to light. EOM are normal. Right eye exhibits no discharge. Left eye exhibits no discharge.   Neck: Normal range of motion. Neck supple. No tracheal deviation present. No thyromegaly present.   Cardiovascular: Normal rate, regular rhythm, normal heart sounds and intact distal pulses.   Pulmonary/Chest: Effort normal and breath sounds normal. No stridor. No respiratory distress. He has no wheezes. He has no rales.   Abdominal: Soft. Bowel sounds are normal. He exhibits no distension. There is no tenderness.   Musculoskeletal: Normal range of motion. He exhibits no edema, tenderness or deformity.   Lymphadenopathy:     He has no cervical adenopathy.   Neurological: He is alert and oriented to person, place, and time. No cranial nerve deficit.   Skin: Skin is warm and dry. He is not diaphoretic.   Psychiatric: He has a normal mood and affect.       DIAGNOSTIC DATA:   Lab Results (last 24 hours)     Procedure Component Value Units Date/Time    Troponin [[id number]]  (Normal) Collected:  07/11/20 1525    Specimen:  Blood Updated:  07/11/20 1649     Troponin T <0.010 ng/mL     Narrative:       Troponin T Reference Range:  <= 0.03 ng/mL-   Negative for AMI  >0.03 ng/mL-     Abnormal for myocardial necrosis.  Clinicians would have to utilize clinical acumen, EKG, Troponin and serial changes to determine if it is an Acute Myocardial Infarction or myocardial injury due to an underlying chronic condition.       Results may be falsely decreased if patient taking Biotin.      BNP [[id number]]  (Normal) Collected:  07/11/20 1525    Specimen:  Blood Updated:  07/11/20 1649     proBNP 86.0 pg/mL     Narrative:       Among  patients with dyspnea, NT-proBNP is highly sensitive for the detection of acute congestive heart failure. In addition NT-proBNP of <300 pg/ml effectively rules out acute congestive heart failure with 99% negative predictive value.    Results may be falsely decreased if patient taking Biotin.      TSH+Free T4 [[id number]]  (Normal) Collected:  07/11/20 1525    Specimen:  Blood Updated:  07/11/20 1649     TSH 1.250 uIU/mL      Free T4 1.37 ng/dL      Comment: T4 results may be falsely increased if patient taking Biotin.       Comprehensive Metabolic Panel [[id number]]  (Abnormal) Collected:  07/11/20 1525    Specimen:  Blood Updated:  07/11/20 1644     Glucose 101 mg/dL      BUN 12 mg/dL      Creatinine 0.88 mg/dL      Sodium 139 mmol/L      Potassium 3.8 mmol/L      Chloride 104 mmol/L      CO2 25.0 mmol/L      Calcium 9.3 mg/dL      Total Protein 6.8 g/dL      Albumin 4.80 g/dL      ALT (SGPT) 23 U/L      AST (SGOT) 23 U/L      Alkaline Phosphatase 51 U/L      Total Bilirubin 1.1 mg/dL      eGFR Non African Amer 87 mL/min/1.73      Globulin 2.0 gm/dL      A/G Ratio 2.4 g/dL      BUN/Creatinine Ratio 13.6     Anion Gap 10.0 mmol/L     Narrative:       GFR Normal >60  Chronic Kidney Disease <60  Kidney Failure <15      Magnesium [[id number]]  (Normal) Collected:  07/11/20 1525    Specimen:  Blood Updated:  07/11/20 1644     Magnesium 2.2 mg/dL     [place] Draw [[id number]] Collected:  07/11/20 1524    Specimen:  Blood Updated:  07/11/20 1630    Narrative:       The following orders were created for panel order [place] Draw.  Procedure                               Abnormality         Status                     ---------                               -----------         ------                     Light Blue Top[[id number]]                                   Final result               Green Top (Gel)[[id number]]                                  Final result               Lavender Top[[id number]]                                      Final result               Gold Top - SST[[id number]]                                   Final result                 Please view results for these tests on the individual orders.    Light Blue Top [[id number]] Collected:  07/11/20 1524    Specimen:  Blood Updated:  07/11/20 1630     Extra Tube hold for add-on     Comment: Auto resulted       Green Top (Gel) [[id number]] Collected:  07/11/20 1525    Specimen:  Blood Updated:  07/11/20 1630     Extra Tube Hold for add-ons.     Comment: Auto resulted.       Lavender Top [[id number]] Collected:  07/11/20 1525    Specimen:  Blood Updated:  07/11/20 1630     Extra Tube hold for add-on     Comment: Auto resulted       Gold Top - SST [[id number]] Collected:  07/11/20 1525    Specimen:  Blood Updated:  07/11/20 1630     Extra Tube Hold for add-ons.     Comment: Auto resulted.       Manual Differential [[id number]]  (Abnormal) Collected:  07/11/20 1525    Specimen:  Blood Updated:  07/11/20 1629     Neutrophil % 50.0 %      Lymphocyte % 28.0 %      Monocyte % 12.0 %      Eosinophil % 2.0 %      Basophil % 3.0 %      Bands %  5.0 %      Neutrophils Absolute 4.28 10*3/mm3      Lymphocytes Absolute 2.18 10*3/mm3      Monocytes Absolute 0.93 10*3/mm3      Eosinophils Absolute 0.16 10*3/mm3      Basophils Absolute 0.23 10*3/mm3      Anisocytosis Large/3+     Basophilic Stippling Slight/1+     Dacrocytes Large/3+     Hypochromia Mod/2+     Schistocytes Slight/1+     WBC Morphology Normal     Platelet Estimate Increased     Large Platelets Slight/1+    Protime-INR [[id number]]  (Normal) Collected:  07/11/20 1524    Specimen:  Blood Updated:  07/11/20 1608     Protime 15.3 Seconds      INR 1.16    Narrative:       Therapeutic range for most indications is 2.0-3.0 INR,  or 2.5-3.5 for mechanical heart valves.    D-dimer, Quantitative [[id number]]  (Normal) Collected:  07/11/20 1524    Specimen:  Blood Updated:  07/11/20 1608     D-Dimer, Quantitative <270 ng/mL (FEU)     Narrative:        Dimer values <500 ng/ml FEU are FDA approved as aid in diagnosis of deep venous thrombosis and pulmonary embolism.  This test should not be used in an exclusion strategy with pretest probability alone.    A recent guideline regarding diagnosis for pulmonary thromboembolism recommends an adjusted exclusion criterion of age x 10 ng/ml FEU for patients >50 years of age ([facility] 2015; 163: 701-711).      aPTT [[id number]]  (Normal) Collected:  07/11/20 1524    Specimen:  Blood Updated:  07/11/20 1608     PTT 35.4 seconds     Narrative:       The recommended Heparin therapeutic range is 68-97 seconds.    CBC & Differential [[id number]] Collected:  07/11/20 1525    Specimen:  Blood Updated:  07/11/20 1559    Narrative:       The following orders were created for panel order CBC & Differential.  Procedure                               Abnormality         Status                     ---------                               -----------         ------                     CBC Auto Differential[[id number]]        Abnormal            Final result                 Please view results for these tests on the individual orders.    CBC Auto Differential [[id number]]  (Abnormal) Collected:  07/11/20 1525    Specimen:  Blood Updated:  07/11/20 1559     WBC 7.78 10*3/mm3      RBC 3.36 10*6/mm3      Hemoglobin 11.1 g/dL      Hematocrit 33.0 %      MCV 98.2 fL      MCH 33.0 pg      MCHC 33.6 g/dL      RDW 29.3 %      RDW-.3 fl      MPV 10.3 fL      Platelets 692 10*3/mm3            Imaging Results (Last 24 Hours)     Procedure Component Value Units Date/Time    XR Chest 1 View [[id number]] Collected:  07/11/20 1608     Updated:  07/11/20 1622    Narrative:         PORTABLE CHEST    HISTORY: Palpitations    Portable AP upright film of the chest was obtained at 4:02 PM.  COMPARISON: July 1, 2020    FINDINGS:   EKG leads.  The lungs are clear of an acute process.  The heart is not enlarged.  The pulmonary vasculature is not  increased.  No pleural effusion.  No pneumothorax.  No acute osseous abnormality.  Degenerative changes are present in the thoracic spine.      Impression:       CONCLUSION:  No Acute Disease    [id number]    Electronically signed by:  Kehinde Sanchez MD  7/11/2020 4:21 PM CDT  Workstation: [id number]            I reviewed the patient's new clinical results.    ASSESSMENT AND PLAN: This is a 65 y.o. male with:    Active Hospital Problems    Diagnosis POA   • **Chest pain [R07.9] Yes     SOLOMON therapy,  Trend troponins, initial troponins negative  Cardiac telemetry  CTA coronary in the a.m.  Of note, high clinical index of suspicion that chest pain is secondary to anxiety.     • MPN (myeloproliferative neoplasm) (CMS/[facility]) [D47.1] Yes     Continue hydroxyurea          I will continue monitor and adjust patient's care as need   DVT prophylaxis: Lovenox     MONICA #    [id number]   reviewed and consistent with patient reported medications.    Expected Length of Stay: Place of Residence in 1-2 days     I discussed the patients findings and my recommendations with patient.     Marv Messina and I have discussed pain goals for this hospitalization after reviewing their current clinical condition, medical history and prior pain experiences.  The goal is to keep the pain level at a 2-3/10.  To help achieve this, I plan to prescribe Tylenol and consider opioid medications if medically appropriate.     Dr. William is the attending on record at time of admission and is aware of the patient's status and agrees with the above history and physical.      Signed,   Art Wheeler MD  Family Medicine Resident PGY2  [facility]        This document has been electronically signed by Art Wheeler MD on July 12, 2020 00:14    Part of this note may be an electronic transcription/translation of spoken language to printed text using the Dragon Dictation System.

## 2020-07-11 NOTE — ED PROVIDER NOTES
"Subjective   64yo male pmh significant htn/mds presents ED c/o episode substernal chest pain characterized as \"ache\"/nonradiating/associated palpitations, tachycardia, fatigue, diaphoresis.  Pt reports 2 additional episodes palpitations/tachycardia/diaphoresis over past 2 days.  Pt denies chest pain at time of exam.  ROS neg fever/chills/soa/syncope/abd pain/n/v/d.      History provided by:  Patient  Chest Pain   Pain location:  Substernal area  Pain quality: aching    Pain radiates to:  Does not radiate  Pain severity:  Moderate  Onset quality:  Sudden  Progression:  Resolved  Chronicity:  New  Associated symptoms: diaphoresis, fatigue and palpitations        Review of Systems   Constitutional: Positive for diaphoresis and fatigue.   HENT: Negative.    Respiratory: Negative.    Cardiovascular: Positive for chest pain and palpitations. Negative for leg swelling.   Gastrointestinal: Negative.    Genitourinary: Negative.    Musculoskeletal: Negative.    Skin: Negative.    Allergic/Immunologic: Positive for immunocompromised state.   All other systems reviewed and are negative.      Past Medical History:   Diagnosis Date   • Hepatitis B    • Hypertension    • Sinus infection        Allergies   Allergen Reactions   • Other Rash     Pt was working with walnut wood and broke out in a rash   • Tetracycline Rash       Past Surgical History:   Procedure Laterality Date   • COLONOSCOPY     • HEMORRHOID BANDING         Family History   Problem Relation Age of Onset   • Diabetes Father    • Cirrhosis Father    • Cancer Sister    • Cancer Maternal Uncle        Social History     Socioeconomic History   • Marital status:      Spouse name: Not on file   • Number of children: Not on file   • Years of education: Not on file   • Highest education level: Not on file   Tobacco Use   • Smoking status: Former Smoker   • Smokeless tobacco: Never Used   • Tobacco comment: quit in early 21 years old   Substance and Sexual Activity "   • Alcohol use: Yes     Alcohol/week: 1.0 standard drinks     Types: 1 Cans of beer per week     Comment: occasionally   • Drug use: Never   • Sexual activity: Defer           Objective   Physical Exam   Constitutional: He is oriented to person, place, and time. He appears well-developed and well-nourished.   HENT:   Head: Normocephalic and atraumatic.   Mouth/Throat: Oropharynx is clear and moist.   Eyes: Pupils are equal, round, and reactive to light.   Neck: Normal range of motion. Neck supple. No JVD present. No tracheal deviation present.   Cardiovascular: Normal rate, regular rhythm, normal heart sounds and intact distal pulses. Exam reveals no gallop and no friction rub.   No murmur heard.  Pulmonary/Chest: Effort normal and breath sounds normal. He has no wheezes. He has no rales.   Abdominal: Soft. Bowel sounds are normal. He exhibits no distension and no mass. There is no tenderness. There is no rebound and no guarding.   Musculoskeletal: Normal range of motion. He exhibits no edema.   Lymphadenopathy:     He has no cervical adenopathy.   Neurological: He is alert and oriented to person, place, and time.   Skin: Skin is warm and dry.   Nursing note and vitals reviewed.      ECG 12 Lead    Date/Time: 7/11/2020 4:28 PM  Performed by: Mukesh Bass MD  Authorized by: Julio Flores MD   Interpreted by physician  Rhythm: sinus rhythm  Rate: normal  BPM: 89  QRS axis: normal  Conduction: conduction normal  ST Segments: ST segments normal  T Waves: T waves normal  Other findings: PRWP  Clinical impression: non-specific ECG                 ED Course      Labs Reviewed   COMPREHENSIVE METABOLIC PANEL - Abnormal; Notable for the following components:       Result Value    Glucose 101 (*)     All other components within normal limits    Narrative:     GFR Normal >60  Chronic Kidney Disease <60  Kidney Failure <15     CBC WITH AUTO DIFFERENTIAL - Abnormal; Notable for the following components:    RBC 3.36 (*)      Hemoglobin 11.1 (*)     Hematocrit 33.0 (*)     MCV 98.2 (*)     RDW 29.3 (*)     RDW-.3 (*)     Platelets 692 (*)     All other components within normal limits   MANUAL DIFFERENTIAL - Abnormal; Notable for the following components:    Basophil % 3.0 (*)     Monocytes Absolute 0.93 (*)     Basophils Absolute 0.23 (*)     All other components within normal limits   PROTIME-INR - Normal    Narrative:     Therapeutic range for most indications is 2.0-3.0 INR,  or 2.5-3.5 for mechanical heart valves.   APTT - Normal    Narrative:     The recommended Heparin therapeutic range is 68-97 seconds.   TROPONIN (IN-HOUSE) - Normal    Narrative:     Troponin T Reference Range:  <= 0.03 ng/mL-   Negative for AMI  >0.03 ng/mL-     Abnormal for myocardial necrosis.  Clinicians would have to utilize clinical acumen, EKG, Troponin and serial changes to determine if it is an Acute Myocardial Infarction or myocardial injury due to an underlying chronic condition.       Results may be falsely decreased if patient taking Biotin.     BNP (IN-HOUSE) - Normal    Narrative:     Among patients with dyspnea, NT-proBNP is highly sensitive for the detection of acute congestive heart failure. In addition NT-proBNP of <300 pg/ml effectively rules out acute congestive heart failure with 99% negative predictive value.    Results may be falsely decreased if patient taking Biotin.     D-DIMER, QUANTITATIVE - Normal    Narrative:     Dimer values <500 ng/ml FEU are FDA approved as aid in diagnosis of deep venous thrombosis and pulmonary embolism.  This test should not be used in an exclusion strategy with pretest probability alone.    A recent guideline regarding diagnosis for pulmonary thromboembolism recommends an adjusted exclusion criterion of age x 10 ng/ml FEU for patients >50 years of age ([facility] 2015; 163: 701-711).     MAGNESIUM - Normal   TSH+FREE T4 - Normal   RAINBOW DRAW    Narrative:     The following orders were created  for panel order [place] Draw.  Procedure                               Abnormality         Status                     ---------                               -----------         ------                     Light Blue Top[[id number]]                                   Final result               Green Top (Gel)[[id number]]                                  Final result               Lavender Top[[id number]]                                     Final result               Gold Top - SST[[id number]]                                   Final result                 Please view results for these tests on the individual orders.   TROPONIN (IN-HOUSE)   LIGHT BLUE TOP   GREEN TOP   LAVENDER TOP   GOLD TOP - SST   CBC AND DIFFERENTIAL    Narrative:     The following orders were created for panel order CBC & Differential.  Procedure                               Abnormality         Status                     ---------                               -----------         ------                     CBC Auto Differential[[id number]]        Abnormal            Final result                 Please view results for these tests on the individual orders.     Xr Chest 1 View    Result Date: 7/11/2020  Narrative: PORTABLE CHEST HISTORY: Palpitations Portable AP upright film of the chest was obtained at 4:02 PM. COMPARISON: July 1, 2020 FINDINGS: EKG leads. The lungs are clear of an acute process. The heart is not enlarged. The pulmonary vasculature is not increased. No pleural effusion. No pneumothorax. No acute osseous abnormality. Degenerative changes are present in the thoracic spine.     Impression: CONCLUSION: No Acute Disease [id number] Electronically signed by:  Kehinde Sanchez MD  7/11/2020 4:21 PM CDT Workstation: [id number]    Xr Chest Pa & Lateral    Result Date: 7/1/2020  Narrative: EXAM: XR CHEST PA AND LATERAL DATE: 7/1/2020 9:54 AM CDT TECHNIQUE: Two-view-PA and lateral radiographs of the chest CLINICAL INDICATION: Baseline x-ray for starting  Chemotherapy, rule out any underlying interstitial lung changes, Z51.11 Encounter for antineoplastic chemotherapy COMPARISON: 4/8/2020 FINDINGS: The lungs are well-expanded and clear. The cardiac silhouette and pulmonary vasculature are within normal limits. There is mild ectasia of the thoracic aorta. There are no pleural effusions.     Impression: 1. Stable appearance of the chest with no radiographic evidence of acute cardiopulmonary disease. Electronically signed by:  Supriya Ashby MD  7/1/2020 1:31 PM CDT Workstation: [id number]                                       HEART Score (for prediction of 6-week risk of major adverse cardiac event) reviewed and/or performed as part of the patient evaluation and treatment planning process.  The result associated with this review/performance is: 4       MDM    Final diagnoses:   Chest pain, unspecified type   Heart palpitations   Myelodysplastic syndrome (CMS/HCC)            Mukesh Bass MD  07/11/20 2740

## 2020-07-12 ENCOUNTER — APPOINTMENT (OUTPATIENT)
Dept: CT IMAGING | Facility: HOSPITAL | Age: 65
End: 2020-07-12

## 2020-07-12 VITALS
OXYGEN SATURATION: 96 % | WEIGHT: 198.4 LBS | SYSTOLIC BLOOD PRESSURE: 147 MMHG | DIASTOLIC BLOOD PRESSURE: 75 MMHG | HEART RATE: 75 BPM | HEIGHT: 72 IN | RESPIRATION RATE: 18 BRPM | BODY MASS INDEX: 26.87 KG/M2 | TEMPERATURE: 99.3 F

## 2020-07-12 PROBLEM — F41.9 ANXIETY: Status: ACTIVE | Noted: 2020-07-12

## 2020-07-12 PROBLEM — R07.9 CHEST PAIN: Status: RESOLVED | Noted: 2020-07-11 | Resolved: 2020-07-12

## 2020-07-12 LAB
ALBUMIN SERPL-MCNC: 3.9 G/DL (ref 3.5–5.2)
ALBUMIN/GLOB SERPL: 2.4 G/DL
ALP SERPL-CCNC: 41 U/L (ref 39–117)
ALT SERPL W P-5'-P-CCNC: 17 U/L (ref 1–41)
ANION GAP SERPL CALCULATED.3IONS-SCNC: 8 MMOL/L (ref 5–15)
AST SERPL-CCNC: 16 U/L (ref 1–40)
BASOPHILS # BLD AUTO: 0.08 10*3/MM3 (ref 0–0.2)
BASOPHILS NFR BLD AUTO: 1.5 % (ref 0–1.5)
BILIRUB SERPL-MCNC: 0.9 MG/DL (ref 0–1.2)
BUN SERPL-MCNC: 15 MG/DL (ref 8–23)
BUN/CREAT SERPL: 16.9 (ref 7–25)
CALCIUM SPEC-SCNC: 8.6 MG/DL (ref 8.6–10.5)
CHLORIDE SERPL-SCNC: 113 MMOL/L (ref 98–107)
CO2 SERPL-SCNC: 26 MMOL/L (ref 22–29)
CREAT SERPL-MCNC: 0.89 MG/DL (ref 0.76–1.27)
DEPRECATED RDW RBC AUTO: 108.1 FL (ref 37–54)
EOSINOPHIL # BLD AUTO: 0.12 10*3/MM3 (ref 0–0.4)
EOSINOPHIL NFR BLD AUTO: 2.3 % (ref 0.3–6.2)
ERYTHROCYTE [DISTWIDTH] IN BLOOD BY AUTOMATED COUNT: 29.3 % (ref 12.3–15.4)
GFR SERPL CREATININE-BSD FRML MDRD: 86 ML/MIN/1.73
GLOBULIN UR ELPH-MCNC: 1.6 GM/DL
GLUCOSE SERPL-MCNC: 99 MG/DL (ref 65–99)
HCT VFR BLD AUTO: 28.4 % (ref 37.5–51)
HGB BLD-MCNC: 9.4 G/DL (ref 13–17.7)
IMM GRANULOCYTES # BLD AUTO: 0.03 10*3/MM3 (ref 0–0.05)
IMM GRANULOCYTES NFR BLD AUTO: 0.6 % (ref 0–0.5)
LYMPHOCYTES # BLD AUTO: 1.54 10*3/MM3 (ref 0.7–3.1)
LYMPHOCYTES NFR BLD AUTO: 29.3 % (ref 19.6–45.3)
MCH RBC QN AUTO: 33.5 PG (ref 26.6–33)
MCHC RBC AUTO-ENTMCNC: 33.1 G/DL (ref 31.5–35.7)
MCV RBC AUTO: 101.1 FL (ref 79–97)
MONOCYTES # BLD AUTO: 0.54 10*3/MM3 (ref 0.1–0.9)
MONOCYTES NFR BLD AUTO: 10.3 % (ref 5–12)
NEUTROPHILS NFR BLD AUTO: 2.95 10*3/MM3 (ref 1.7–7)
NEUTROPHILS NFR BLD AUTO: 56 % (ref 42.7–76)
NRBC BLD AUTO-RTO: 0.4 /100 WBC (ref 0–0.2)
PLATELET # BLD AUTO: 532 10*3/MM3 (ref 140–450)
PMV BLD AUTO: 10.2 FL (ref 6–12)
POTASSIUM SERPL-SCNC: 4.6 MMOL/L (ref 3.5–5.2)
PROT SERPL-MCNC: 5.5 G/DL (ref 6–8.5)
RBC # BLD AUTO: 2.81 10*6/MM3 (ref 4.14–5.8)
SODIUM SERPL-SCNC: 147 MMOL/L (ref 136–145)
TROPONIN T SERPL-MCNC: <0.01 NG/ML (ref 0–0.03)
TROPONIN T SERPL-MCNC: <0.01 NG/ML (ref 0–0.03)
WBC # BLD AUTO: 5.26 10*3/MM3 (ref 3.4–10.8)

## 2020-07-12 PROCEDURE — 99217 PR OBSERVATION CARE DISCHARGE MANAGEMENT: CPT | Performed by: STUDENT IN AN ORGANIZED HEALTH CARE EDUCATION/TRAINING PROGRAM

## 2020-07-12 PROCEDURE — 96376 TX/PRO/DX INJ SAME DRUG ADON: CPT

## 2020-07-12 PROCEDURE — 85025 COMPLETE CBC W/AUTO DIFF WBC: CPT | Performed by: STUDENT IN AN ORGANIZED HEALTH CARE EDUCATION/TRAINING PROGRAM

## 2020-07-12 PROCEDURE — 96375 TX/PRO/DX INJ NEW DRUG ADDON: CPT

## 2020-07-12 PROCEDURE — 96374 THER/PROPH/DIAG INJ IV PUSH: CPT

## 2020-07-12 PROCEDURE — 84484 ASSAY OF TROPONIN QUANT: CPT | Performed by: FAMILY MEDICINE

## 2020-07-12 PROCEDURE — 0 IOPAMIDOL PER 1 ML: Performed by: STUDENT IN AN ORGANIZED HEALTH CARE EDUCATION/TRAINING PROGRAM

## 2020-07-12 PROCEDURE — G0378 HOSPITAL OBSERVATION PER HR: HCPCS

## 2020-07-12 PROCEDURE — 75574 CT ANGIO HRT W/3D IMAGE: CPT

## 2020-07-12 PROCEDURE — 96361 HYDRATE IV INFUSION ADD-ON: CPT

## 2020-07-12 PROCEDURE — 80053 COMPREHEN METABOLIC PANEL: CPT | Performed by: STUDENT IN AN ORGANIZED HEALTH CARE EDUCATION/TRAINING PROGRAM

## 2020-07-12 RX ORDER — METOPROLOL TARTRATE 5 MG/5ML
2.5 INJECTION INTRAVENOUS
Status: DISCONTINUED | OUTPATIENT
Start: 2020-07-12 | End: 2020-07-12 | Stop reason: HOSPADM

## 2020-07-12 RX ORDER — METOPROLOL TARTRATE 5 MG/5ML
INJECTION INTRAVENOUS
Status: COMPLETED
Start: 2020-07-12 | End: 2020-07-12

## 2020-07-12 RX ORDER — HYDROXYZINE PAMOATE 25 MG/1
25 CAPSULE ORAL 3 TIMES DAILY PRN
Qty: 30 CAPSULE | Refills: 0 | Status: SHIPPED | OUTPATIENT
Start: 2020-07-12 | End: 2021-04-07

## 2020-07-12 RX ORDER — ASPIRIN 81 MG/1
81 TABLET ORAL DAILY
Qty: 30 TABLET | Refills: 0 | Status: SHIPPED | OUTPATIENT
Start: 2020-07-12

## 2020-07-12 RX ORDER — SERTRALINE HYDROCHLORIDE 25 MG/1
25 TABLET, FILM COATED ORAL DAILY
Qty: 30 TABLET | Refills: 0 | Status: SHIPPED | OUTPATIENT
Start: 2020-07-12 | End: 2021-04-07

## 2020-07-12 RX ADMIN — IOPAMIDOL 90 ML: 755 INJECTION, SOLUTION INTRAVENOUS at 12:00

## 2020-07-12 RX ADMIN — HYDROXYUREA 500 MG: 500 CAPSULE ORAL at 09:07

## 2020-07-12 RX ADMIN — METOPROLOL TARTRATE: 5 INJECTION INTRAVENOUS at 12:03

## 2020-07-12 RX ADMIN — METOPROLOL TARTRATE 2.5 MG: 5 INJECTION INTRAVENOUS at 10:26

## 2020-07-12 RX ADMIN — FOLIC ACID 1 MG: 1 TABLET ORAL at 09:07

## 2020-07-12 RX ADMIN — METOPROLOL TARTRATE 2.5 MG: 5 INJECTION INTRAVENOUS at 09:39

## 2020-07-12 NOTE — PROGRESS NOTES
FAMILY MEDICINE DAILY PROGRESS NOTE  NAME: Marv Messina  : 1955  MRN: [id number]     LOS: 0 days     PROVIDER OF SERVICE: Danna Medel MD    Chief Complaint: Chest pain    Subjective:     Interval History:  History taken from: patient chart    No acute events overnight.  Denies chest pain and shortness of air.  Troponins negative x2.  He is refusing to allow lab draw for the third troponin as he is adamant it is not cardiac related and is more associated with anxiety regarding his myeloproliferative neoplasm diagnosis.  He does endorse anxiety and palpitations.  CTA of the coronaries showed a calcified plaque in the RCA and a calcium score of 70 consistent with low risk for coronary disease.    Review of Systems:   Review of Systems   Constitutional: Negative for activity change, appetite change, chills, fatigue and fever.   HENT: Negative for congestion, rhinorrhea, sinus pain and sore throat.    Eyes: Negative for pain, redness and visual disturbance.   Respiratory: Negative for cough, shortness of breath and wheezing.    Cardiovascular: Positive for palpitations. Negative for chest pain and leg swelling.   Gastrointestinal: Negative for abdominal pain, constipation, diarrhea, nausea and vomiting.   Genitourinary: Negative for dysuria and flank pain.   Musculoskeletal: Negative for arthralgias, joint swelling and myalgias.   Skin: Negative for color change, pallor and rash.   Neurological: Negative for dizziness, numbness and headaches.   Psychiatric/Behavioral: Negative for dysphoric mood and sleep disturbance. The patient is nervous/anxious.        Objective:     Vital Signs  Temp:  [96.7 °F (35.9 °C)-99.3 °F (37.4 °C)] 99.3 °F (37.4 °C)  Heart Rate:  [] 75  Resp:  [16-20] 18  BP: (118-169)/(59-80) 147/75    Physical Exam  Physical Exam   Constitutional: He is oriented to person, place, and time. Vital signs are normal. He appears well-developed and well-nourished. No distress.    Becomes anxious and somewhat distressed when discussing neoplasm diagnosis and uncertainty of treatment choices.   HENT:   Head: Normocephalic and atraumatic.   Right Ear: Hearing normal.   Left Ear: Hearing normal.   Eyes: Pupils are equal, round, and reactive to light. Conjunctivae, EOM and lids are normal.   Neck: Normal range of motion. Neck supple. No JVD present.   Cardiovascular: Normal rate, regular rhythm, S1 normal, S2 normal and normal heart sounds.   No murmur heard.  Pulmonary/Chest: Effort normal and breath sounds normal. No respiratory distress. He has no wheezes. He has no rales.   Abdominal: Soft. Normal appearance and bowel sounds are normal. He exhibits no distension. There is no tenderness. There is no guarding.   Musculoskeletal: Normal range of motion. He exhibits no edema, tenderness or deformity.   Lymphadenopathy:     He has no cervical adenopathy.   Neurological: He is alert and oriented to person, place, and time. He has normal strength. He is not disoriented.   Skin: Skin is warm, dry and intact. No rash noted. He is not diaphoretic. No erythema. No pallor.   Psychiatric: He has a normal mood and affect. His speech is normal and behavior is normal. Cognition and memory are normal.       Medication Review    Current Facility-Administered Medications:   •  acetaminophen (TYLENOL) tablet 650 mg, 650 mg, Oral, Q4H PRN, Art Wheeler MD  •  enoxaparin (LOVENOX) syringe 40 mg, 40 mg, Subcutaneous, Q24H, Art Wheeler MD  •  folic acid (FOLVITE) tablet 1 mg, 1 mg, Oral, Daily, Art Wheeler MD, 1 mg at 07/12/20 0907  •  hydroxyurea (HYDREA) capsule 500 mg, 500 mg, Oral, BID, Art Wheeler MD, 500 mg at 07/12/20 0907  •  melatonin tablet 5.25 mg, 5.25 mg, Oral, Nightly PRN, Art Wheeler MD, 5.25 mg at 07/11/20 2103  •  metoprolol tartrate (LOPRESSOR) injection 2.5 mg, 2.5 mg, Intravenous, Q5 Min PRN, Danna Medel MD, 2.5 mg at 07/12/20 1026  •  morphine injection 1 mg, 1 mg,  Intravenous, Q4H PRN **AND** naloxone (NARCAN) injection 0.4 mg, 0.4 mg, Intravenous, Q5 Min PRN, Art Wheeler MD  •  ondansetron (ZOFRAN) tablet 4 mg, 4 mg, Oral, Q6H PRN **OR** ondansetron (ZOFRAN) injection 4 mg, 4 mg, Intravenous, Q6H PRN, Art Wheeler MD  •  Pharmacy to Dose enoxaparin (LOVENOX), , Does not apply, Continuous PRN, Art Wheeler MD  •  sodium chloride 0.9 % flush 10 mL, 10 mL, Intravenous, PRN, Art Wheeler MD  •  [COMPLETED] Insert peripheral IV, , , Once **AND** sodium chloride 0.9 % flush 10 mL, 10 mL, Intravenous, PRN, Art Wheeler MD  •  sodium chloride 0.9 % flush 10 mL, 10 mL, Intravenous, Q12H, Art Wheeler MD, 10 mL at 07/11/20 2103  •  sodium chloride 0.9 % flush 10 mL, 10 mL, Intravenous, PRN, Art Wheeler MD  •  sodium chloride 0.9 % infusion, 100 mL/hr, Intravenous, Continuous, Art Wheeler MD, Last Rate: 100 mL/hr at 07/12/20 1203, 100 mL/hr at 07/12/20 1203    Current Outpatient Medications:   •  BLACK ELDERBERRY PO, Take  by mouth. 1 tablespoon daily, Disp: , Rfl:   •  Cyanocobalamin (VITAMIN B 12 PO), Take 1 tablet by mouth Daily., Disp: , Rfl:   •  VITAMIN C, CALCIUM ASCORBATE, PO, Take  by mouth. 2 g of powder daily, Disp: , Rfl:   •  aspirin (aspirin) 81 MG EC tablet, Take 1 tablet by mouth Daily., Disp: 30 tablet, Rfl: 0  •  docusate sodium (COLACE) 100 MG capsule, Take 100 mg by mouth 2 (Two) Times a Day As Needed., Disp: , Rfl:   •  folic acid (FOLVITE) 1 MG tablet, 1 tablet by mouth on Monday, Wednesday and Friday, Disp: 36 tablet, Rfl: 1  •  hydroxyurea (HYDREA) 500 MG capsule, Take 1 capsule in the morning and 2 capsules at night, Disp: 90 capsule, Rfl: 1  •  hydrOXYzine pamoate (Vistaril) 25 MG capsule, Take 1 capsule by mouth 3 (Three) Times a Day As Needed for Anxiety., Disp: 30 capsule, Rfl: 0  •  NON FORMULARY, Daily. kiolic garlic, Disp: , Rfl:   •  sertraline (ZOLOFT) 25 MG tablet, Take 1 tablet by mouth Daily., Disp: 30 tablet, Rfl:  0     Diagnostic Data    Lab Results (last 24 hours)     Procedure Component Value Units Date/Time    Troponin [[id number]]  (Normal) Collected:  07/12/20 0310    Specimen:  Blood Updated:  07/12/20 0352     Troponin T <0.010 ng/mL     Narrative:       Troponin T Reference Range:  <= 0.03 ng/mL-   Negative for AMI  >0.03 ng/mL-     Abnormal for myocardial necrosis.  Clinicians would have to utilize clinical acumen, EKG, Troponin and serial changes to determine if it is an Acute Myocardial Infarction or myocardial injury due to an underlying chronic condition.       Results may be falsely decreased if patient taking Biotin.      Comprehensive Metabolic Panel [[id number]]  (Abnormal) Collected:  07/12/20 0310    Specimen:  Blood Updated:  07/12/20 0352     Glucose 99 mg/dL      BUN 15 mg/dL      Creatinine 0.89 mg/dL      Sodium 147 mmol/L      Potassium 4.6 mmol/L      Chloride 113 mmol/L      CO2 26.0 mmol/L      Calcium 8.6 mg/dL      Total Protein 5.5 g/dL      Albumin 3.90 g/dL      ALT (SGPT) 17 U/L      AST (SGOT) 16 U/L      Alkaline Phosphatase 41 U/L      Total Bilirubin 0.9 mg/dL      eGFR Non African Amer 86 mL/min/1.73      Globulin 1.6 gm/dL      A/G Ratio 2.4 g/dL      BUN/Creatinine Ratio 16.9     Anion Gap 8.0 mmol/L     Narrative:       GFR Normal >60  Chronic Kidney Disease <60  Kidney Failure <15      CBC Auto Differential [[id number]]  (Abnormal) Collected:  07/12/20 0310    Specimen:  Blood Updated:  07/12/20 0335     WBC 5.26 10*3/mm3      RBC 2.81 10*6/mm3      Hemoglobin 9.4 g/dL      Hematocrit 28.4 %      .1 fL      MCH 33.5 pg      MCHC 33.1 g/dL      RDW 29.3 %      RDW-.1 fl      MPV 10.2 fL      Platelets 532 10*3/mm3      Neutrophil % 56.0 %      Lymphocyte % 29.3 %      Monocyte % 10.3 %      Eosinophil % 2.3 %      Basophil % 1.5 %      Immature Grans % 0.6 %      Neutrophils, Absolute 2.95 10*3/mm3      Lymphocytes, Absolute 1.54 10*3/mm3      Monocytes, Absolute 0.54  10*3/mm3      Eosinophils, Absolute 0.12 10*3/mm3      Basophils, Absolute 0.08 10*3/mm3      Immature Grans, Absolute 0.03 10*3/mm3      nRBC 0.4 /100 WBC     Troponin [[id number]]  (Normal) Collected:  07/11/20 1525    Specimen:  Blood Updated:  07/11/20 1649     Troponin T <0.010 ng/mL     Narrative:       Troponin T Reference Range:  <= 0.03 ng/mL-   Negative for AMI  >0.03 ng/mL-     Abnormal for myocardial necrosis.  Clinicians would have to utilize clinical acumen, EKG, Troponin and serial changes to determine if it is an Acute Myocardial Infarction or myocardial injury due to an underlying chronic condition.       Results may be falsely decreased if patient taking Biotin.      BNP [[id number]]  (Normal) Collected:  07/11/20 1525    Specimen:  Blood Updated:  07/11/20 1649     proBNP 86.0 pg/mL     Narrative:       Among patients with dyspnea, NT-proBNP is highly sensitive for the detection of acute congestive heart failure. In addition NT-proBNP of <300 pg/ml effectively rules out acute congestive heart failure with 99% negative predictive value.    Results may be falsely decreased if patient taking Biotin.      TSH+Free T4 [[id number]]  (Normal) Collected:  07/11/20 1525    Specimen:  Blood Updated:  07/11/20 1649     TSH 1.250 uIU/mL      Free T4 1.37 ng/dL      Comment: T4 results may be falsely increased if patient taking Biotin.       Comprehensive Metabolic Panel [[id number]]  (Abnormal) Collected:  07/11/20 1525    Specimen:  Blood Updated:  07/11/20 1644     Glucose 101 mg/dL      BUN 12 mg/dL      Creatinine 0.88 mg/dL      Sodium 139 mmol/L      Potassium 3.8 mmol/L      Chloride 104 mmol/L      CO2 25.0 mmol/L      Calcium 9.3 mg/dL      Total Protein 6.8 g/dL      Albumin 4.80 g/dL      ALT (SGPT) 23 U/L      AST (SGOT) 23 U/L      Alkaline Phosphatase 51 U/L      Total Bilirubin 1.1 mg/dL      eGFR Non African Amer 87 mL/min/1.73      Globulin 2.0 gm/dL      A/G Ratio 2.4 g/dL      BUN/Creatinine  Ratio 13.6     Anion Gap 10.0 mmol/L     Narrative:       GFR Normal >60  Chronic Kidney Disease <60  Kidney Failure <15      Magnesium [[id number]]  (Normal) Collected:  07/11/20 1525    Specimen:  Blood Updated:  07/11/20 1644     Magnesium 2.2 mg/dL     [place] Draw [[id number]] Collected:  07/11/20 1524    Specimen:  Blood Updated:  07/11/20 1630    Narrative:       The following orders were created for panel order [place] Draw.  Procedure                               Abnormality         Status                     ---------                               -----------         ------                     Light Blue Top[[id number]]                                   Final result               Green Top (Gel)[[id number]]                                  Final result               Lavender Top[[id number]]                                     Final result               Gold Top - SST[[id number]]                                   Final result                 Please view results for these tests on the individual orders.    Light Blue Top [[id number]] Collected:  07/11/20 1524    Specimen:  Blood Updated:  07/11/20 1630     Extra Tube hold for add-on     Comment: Auto resulted       Green Top (Gel) [[id number]] Collected:  07/11/20 1525    Specimen:  Blood Updated:  07/11/20 1630     Extra Tube Hold for add-ons.     Comment: Auto resulted.       Lavender Top [[id number]] Collected:  07/11/20 1525    Specimen:  Blood Updated:  07/11/20 1630     Extra Tube hold for add-on     Comment: Auto resulted       Gold Top - SST [[id number]] Collected:  07/11/20 1525    Specimen:  Blood Updated:  07/11/20 1630     Extra Tube Hold for add-ons.     Comment: Auto resulted.       Manual Differential [[id number]]  (Abnormal) Collected:  07/11/20 1525    Specimen:  Blood Updated:  07/11/20 1629     Neutrophil % 50.0 %      Lymphocyte % 28.0 %      Monocyte % 12.0 %      Eosinophil % 2.0 %      Basophil % 3.0 %      Bands %  5.0 %      Neutrophils  Absolute 4.28 10*3/mm3      Lymphocytes Absolute 2.18 10*3/mm3      Monocytes Absolute 0.93 10*3/mm3      Eosinophils Absolute 0.16 10*3/mm3      Basophils Absolute 0.23 10*3/mm3      Anisocytosis Large/3+     Basophilic Stippling Slight/1+     Dacrocytes Large/3+     Hypochromia Mod/2+     Schistocytes Slight/1+     WBC Morphology Normal     Platelet Estimate Increased     Large Platelets Slight/1+    Protime-INR [[id number]]  (Normal) Collected:  07/11/20 1524    Specimen:  Blood Updated:  07/11/20 1608     Protime 15.3 Seconds      INR 1.16    Narrative:       Therapeutic range for most indications is 2.0-3.0 INR,  or 2.5-3.5 for mechanical heart valves.    D-dimer, Quantitative [[id number]]  (Normal) Collected:  07/11/20 1524    Specimen:  Blood Updated:  07/11/20 1608     D-Dimer, Quantitative <270 ng/mL (FEU)     Narrative:       Dimer values <500 ng/ml FEU are FDA approved as aid in diagnosis of deep venous thrombosis and pulmonary embolism.  This test should not be used in an exclusion strategy with pretest probability alone.    A recent guideline regarding diagnosis for pulmonary thromboembolism recommends an adjusted exclusion criterion of age x 10 ng/ml FEU for patients >50 years of age ([employer] [facility] 2015; 163: 701-711).      aPTT [[id number]]  (Normal) Collected:  07/11/20 1524    Specimen:  Blood Updated:  07/11/20 1608     PTT 35.4 seconds     Narrative:       The recommended Heparin therapeutic range is 68-97 seconds.    CBC & Differential [[id number]] Collected:  07/11/20 1525    Specimen:  Blood Updated:  07/11/20 1559    Narrative:       The following orders were created for panel order CBC & Differential.  Procedure                               Abnormality         Status                     ---------                               -----------         ------                     CBC Auto Differential[[id number]]        Abnormal            Final result                 Please view results for these tests  on the individual orders.    CBC Auto Differential [[id number]]  (Abnormal) Collected:  07/11/20 1525    Specimen:  Blood Updated:  07/11/20 1559     WBC 7.78 10*3/mm3      RBC 3.36 10*6/mm3      Hemoglobin 11.1 g/dL      Hematocrit 33.0 %      MCV 98.2 fL      MCH 33.0 pg      MCHC 33.6 g/dL      RDW 29.3 %      RDW-.3 fl      MPV 10.3 fL      Platelets 692 10*3/mm3            Imaging Results (Last 24 Hours)     Procedure Component Value Units Date/Time    CT Angiogram Coronary [[id number]] Collected:  07/12/20 1020     Updated:  07/12/20 1144    Narrative:       CT coronary arteriogram. Calcium score. Functional analysis.  CLINICAL HISTORY: 65-year-old male with chest pain.       COMPARISON: Chest x-ray July 11, 2020. .    Post processing was performed by the radiologist at the [facility]  workstation. Serial axial CT images were obtained through the  heart at 3 mm thickness without contrast for calcium scoring. 3D  images including vessel probing technique were also obtained.  Subsequently, following the intravenous administration of 90     ml of [id number]   , serial axial CT images were obtained through  the heart at 0.6 mm thickness utilizing retrospective gating.  Images were obtained after premedication with 10    mg of  metoprolol, IV. Heart rate 67-71.  Full field of view  reconstructed images were used for evaluation of the extracardiac  tissues.    This exam was performed according to our departmental  dose-optimization program, which includes automated exposure  control, adjustment of the mA and/or kV according to patient size  and/or use of iterative reconstruction technique.      CALCIUM PLAQUE BURDEN:    REGION                                         CALCIUM SCORE  ([place])  Left Main                                                      0       Right Coronary Artery                                 62      Left Anterior Descending                            8      Circumflex                                                     0       Posterior Descending Artery                       0             Your Calcium Score is 70   .      This places the patent in the low    risk for coronary artery  disease. (Definite at least mild atherosclerotic plaque. Mild or  minimal coronary narrowings likely.).    CTA OF THE CORONARY ARTERIES: There is good    visualization of  the left main, LAD, diagonals, circumflex, and RCA. There is a  type A    LAD. The patient is right    dominant.    Left Main: No calcified or soft itssue density plaque and no  stenosis.  LAD: Minimal calcified plaque proximal LAD. No stenosis.    .  Circumflex: No calcified or soft tissue density plaque and no  stenosis.  RCA: Calcified plaque mid right coronary artery. There may be one  focal area of stenosis approaching 70%.    The aortic valve is tricuspid. There is no myocardial bridging.  On short axis views, the myocardium is homogeneous in thickness.    EXTRACARDIAC SOFT TISSUES:  Mediastinum: On the imaging, the ascending and descending aorta  are normal in caliber. There is no mediastinal or hilar  lymphadenopathy. The pericardium is normal.  Lungs: No consolidation or focal nodules are present. There is no  pneumothorax or effusion. The pulmonary arteries are normal in  appearance.  Abdomen: No evidence of hiatal hernia. The imaged liver and  spleen are unremarkable.     Bones: There are no lytic or blastic lesions within the osseous  structures.    CT FUNCTIONAL ANALYSIS:  Ejection Fraction      63    %  Diastolic Volume      158    ml  Systolic Volume       58    ml  Stroke Volume         100    ml  Cardiac Output        6.9    L/minute      Impression:       Abnormal CT coronary arteriogram.    Calcified plaque mid right coronary artery. There is one area of  narrowing which approaches 70%.  Minimal calcified plaque proximal LAD. No stenosis. Normal  circumflex.       Calcium score 70, low risk for coronary disease.    Normal functional  analysis. Computer-assisted calculation of left  ventricular ejection fraction 63 %.    Electronically signed by:  Kirill Newman MD  7/12/2020 11:43 AM CDT  Workstation: [id number]    XR Chest 1 View [[id number]] Collected:  07/11/20 1608     Updated:  07/11/20 1622    Narrative:         PORTABLE CHEST    HISTORY: Palpitations    Portable AP upright film of the chest was obtained at 4:02 PM.  COMPARISON: July 1, 2020    FINDINGS:   EKG leads.  The lungs are clear of an acute process.  The heart is not enlarged.  The pulmonary vasculature is not increased.  No pleural effusion.  No pneumothorax.  No acute osseous abnormality.  Degenerative changes are present in the thoracic spine.      Impression:       CONCLUSION:  No Acute Disease    [id number]    Electronically signed by:  Kehinde Sanchez MD  7/11/2020 4:21 PM CDT  Workstation: [id number]          I reviewed the patient's new clinical results.  I reviewed the patient's new imaging results and agree with the interpretation.    Assessment/Plan:     Active Hospital Problems    Diagnosis   • **Chest pain     -Likely 2/2 to anxiety.   -SOLOMON therapy  -Troponin negative x2. Refusing another lab draw for 3rd troponin.  -Cardiac telemetry  -CTA coronary showed calcium score 70. Calcified plaque in mid RCA approaching 70% narrowing. Per Dr. Newman's interpretation.     • Anxiety     -Related to MPN and uncertain treatment options.  -Likely cause of chest pain.  -Will discharge with SSRI and Vistaril prn.     • MPN (myeloproliferative neoplasm) (CMS/HCC)     Continue hydroxyurea             DVT prophylaxis: Lovenox  Code Status and Medical Interventions:   Ordered at: 07/11/20 190     Level Of Support Discussed With:    Patient     Code Status:    CPR     Medical Interventions (Level of Support Prior to Arrest):    Full       Plan for disposition:Where: home and When:  today      Time: 18 mins       Danna Medel M.D. PGY2  [facility]  [address]  [place] [address]  Office: [phone]  This document has been electronically signed by Danna Medel MD on July 12, 2020 14:09      Dictated utilizing Dragon dictation.

## 2020-07-12 NOTE — PLAN OF CARE
Problem: Patient Care Overview  Goal: Plan of Care Review  Outcome: Ongoing (interventions implemented as appropriate)  Flowsheets  Taken 7/12/2020 0322  Progress: improving  Outcome Summary: No CP since arrival to [facility]. Pt. voiced anxiety r/t recent health changes and pending meeting Monday to determine treatment of recently dx cancer. Pt. scheduled for CTA today. VSS, will continue to monitor.  Taken 7/11/2020 1951  Plan of Care Reviewed With: patient

## 2020-07-12 NOTE — SIGNIFICANT NOTE
"PETTY Bey notified this provider of patient's refusal of third troponin.  This provider went and discussed with the patient.  Stressed importance of checking 3 troponins due to his complaint of chest pain.  Patient refused saying that he \"does not want to be stuck anymore\" and that \"this is not caused by his heart.\"  Attempted to give patient to agree for 1 more lab draw but patient continued to refuse.      Danna Medel M.D. PGY2  [facility]  [address] [place] [address]  Office: [phone]  This document has been electronically signed by Danna Medel MD on July 12, 2020 12:50      "

## 2020-07-12 NOTE — NURSING NOTE
Dr. Medel notified about refusal of 3rd troponin to be drawn.   Patient cussed .  Nurse educated to patient about importance of 3rd trop being drawn because c/o CP on arrival to hospital.   Patient continues to refuse

## 2020-07-12 NOTE — DISCHARGE SUMMARY
DISCHARGE SUMMARY    PATIENT NAME: Marv Messina       PHYSICIAN: Danna Medel MD  : [id number]  MRN: [id number]    ADMITTED: 2020     DISCHARGED: 2020    ADMISSION DIAGNOSES:  Active Hospital Problems    Diagnosis  POA   • Anxiety [F41.9]  Yes   • MPN (myeloproliferative neoplasm) (CMS/HCC) [D47.1]  Yes      Resolved Hospital Problems    Diagnosis Date Resolved POA   • **Chest pain [R07.9] 2020 Yes     DISCHARGE DIAGNOSES:   Active Hospital Problems    Diagnosis  POA   • Anxiety [F41.9]  Yes   • MPN (myeloproliferative neoplasm) (CMS/HCC) [D47.1]  Yes      Resolved Hospital Problems    Diagnosis Date Resolved POA   • **Chest pain [R07.9] 2020 Yes       SERVICE: Family Medicine Residency  Attending: Darius William MD  Resident: Danna Medel MD    CONSULTS:   Consult Orders (all) (From admission, onward)     Start     Ordered    20 1725  Family Practice - Resident (on-call MD unless specified)  Once     Specialty:  Family Medicine  Provider:  (Not yet assigned)    20 1724                PROCEDURES:   N/A    HISTORY OF PRESENT ILLNESS: Per Dr. Wheeler's H&P on 2020  Marv Messina is a 65 y.o. male with a CMH of hypertension and myeloproliferative neoplasm who presents complaining of 1 day history of substernal chest pressure without radiation.  Patient rated pressure at a 2-3 out of 10.  Associated with occasional palpitations and increased anxiety.  Patient endorsed that he has had increased anxiety over the past few days.  At time of interview, patient denied chest pain, chest pressure, palpitations, shortness of air, fever, chills, nausea/vomiting or  diarrhea.    DIAGNOSTIC DATA:   Results from last 7 days   Lab Units 20  0310 200 20  1525   TROPONIN T ng/mL <0.010 <0.010 <0.010     WBC   Date Value Ref Range Status   2020 5.26 3.40 - 10.80 10*3/mm3 Final     RBC   Date Value Ref Range Status   2020 2.81 (L) 4.14 -  5.80 10*6/mm3 Final     Hemoglobin   Date Value Ref Range Status   07/12/2020 9.4 (L) 13.0 - 17.7 g/dL Final     Hematocrit   Date Value Ref Range Status   07/12/2020 28.4 (L) 37.5 - 51.0 % Final     MCV   Date Value Ref Range Status   07/12/2020 101.1 (H) 79.0 - 97.0 fL Final     MCH   Date Value Ref Range Status   07/12/2020 33.5 (H) 26.6 - 33.0 pg Final     MCHC   Date Value Ref Range Status   07/12/2020 33.1 31.5 - 35.7 g/dL Final     RDW   Date Value Ref Range Status   07/12/2020 29.3 (H) 12.3 - 15.4 % Final     RDW-SD   Date Value Ref Range Status   07/12/2020 108.1 (H) 37.0 - 54.0 fl Final     MPV   Date Value Ref Range Status   07/12/2020 10.2 6.0 - 12.0 fL Final     Platelets   Date Value Ref Range Status   07/12/2020 532 (H) 140 - 450 10*3/mm3 Final     Neutrophil %   Date Value Ref Range Status   07/12/2020 56.0 42.7 - 76.0 % Final     Lymphocyte %   Date Value Ref Range Status   07/12/2020 29.3 19.6 - 45.3 % Final     Monocyte %   Date Value Ref Range Status   07/12/2020 10.3 5.0 - 12.0 % Final     Eosinophil %   Date Value Ref Range Status   07/12/2020 2.3 0.3 - 6.2 % Final     Basophil %   Date Value Ref Range Status   07/12/2020 1.5 0.0 - 1.5 % Final     Immature Grans %   Date Value Ref Range Status   07/12/2020 0.6 (H) 0.0 - 0.5 % Final     Neutrophils, Absolute   Date Value Ref Range Status   07/12/2020 2.95 1.70 - 7.00 10*3/mm3 Final     Lymphocytes, Absolute   Date Value Ref Range Status   07/12/2020 1.54 0.70 - 3.10 10*3/mm3 Final     Monocytes, Absolute   Date Value Ref Range Status   07/12/2020 0.54 0.10 - 0.90 10*3/mm3 Final     Eosinophils, Absolute   Date Value Ref Range Status   07/12/2020 0.12 0.00 - 0.40 10*3/mm3 Final     Basophils, Absolute   Date Value Ref Range Status   07/12/2020 0.08 0.00 - 0.20 10*3/mm3 Final     Immature Grans, Absolute   Date Value Ref Range Status   07/12/2020 0.03 0.00 - 0.05 10*3/mm3 Final     nRBC   Date Value Ref Range Status   07/12/2020 0.4 (H) 0.0 - 0.2  /100 WBC Final     Lab Results   Component Value Date    GLUCOSE 99 07/12/2020    BUN 15 07/12/2020    CREATININE 0.89 07/12/2020    EGFRIFNONA 86 07/12/2020    BCR 16.9 07/12/2020    K 4.6 07/12/2020    CO2 26.0 07/12/2020    CALCIUM 8.6 07/12/2020    ALBUMIN 3.90 07/12/2020    AST 16 07/12/2020    ALT 17 07/12/2020     Xr Chest 1 View    Result Date: 7/11/2020  CONCLUSION: No Acute Disease [address]121 Electronically signed by:  Kehinde Sanchez MD  7/11/2020 4:21 PM CDT Workstation: [id number]    Ct Angiogram Coronary    Result Date: 7/12/2020  Abnormal CT coronary arteriogram. Calcified plaque mid right coronary artery. There is one area of narrowing which approaches 70%. Minimal calcified plaque proximal LAD. No stenosis. Normal circumflex. Calcium score 70, low risk for coronary disease. Normal functional analysis. Computer-assisted calculation of left ventricular ejection fraction 63 %. Electronically signed by:  Kirill Newman MD  7/12/2020 11:43 AM CDT Workstation: [id number]       HOSPITAL COURSE:  Patient was admitted for chest pain.  Troponins were negative x2.  Patient refused lab draw for the third troponin even after education about the importance of drawing 3 troponins.  CTA of the coronaries showed a calcium score of 70 with a low risk for coronary disease.  There was 1 area of approximately 70% narrowing in the mid RCA.  Patient remained chest pain-free while admitted and ACS was ruled out.  Symptoms are likely secondary to anxiety about his myeloproliferative neoplasm diagnosis.  Patient was stable and agreeable to discharge.  He was started on Zoloft and Vistaril for anxiety.  He is also instructed to take aspirin.  Patient advised to follow-up with PCP in 1 week for further management and evaluation.    DISCHARGE CONDITION:   Stable    DISPOSITION:  Home or Self Care    DISCHARGE MEDICATIONS     Discharge Medications      New Medications      Instructions Start Date   aspirin 81 MG EC tablet   81 mg, Oral,  Daily      hydrOXYzine pamoate 25 MG capsule  Commonly known as:  Vistaril   25 mg, Oral, 3 Times Daily PRN      sertraline 25 MG tablet  Commonly known as:  ZOLOFT   25 mg, Oral, Daily         Continue These Medications      Instructions Start Date   BLACK ELDERBERRY PO   Oral, 1 tablespoon daily       docusate sodium 100 MG capsule  Commonly known as:  COLACE   100 mg, Oral, 2 Times Daily PRN      folic acid 1 MG tablet  Commonly known as:  FOLVITE   1 tablet by mouth on Monday, Wednesday and Friday      hydroxyurea 500 MG capsule  Commonly known as:  HYDREA   Take 1 capsule in the morning and 2 capsules at night      NON FORMULARY   Daily, kiolic garlic       VITAMIN B 12 PO   1 tablet, Oral, Daily      VITAMIN C (CALCIUM ASCORBATE) PO   Oral, 2 g of powder daily              INSTRUCTIONS:  Activity:   Activity Instructions     Activity as Tolerated          Diet:   Diet Instructions     Diet: Regular      Discharge Diet:  Regular          FOLLOW UP:   Additional Instructions for the Follow-ups that You Need to Schedule     Discharge Follow-up with PCP   As directed       Currently Documented PCP:    Provider, No Known    PCP Phone Number:    None     Follow Up Details:  hosp f/u 1 week           Follow-up Information     Bebo Donovan MD Follow up.    Specialty:  Family Medicine  Why:  OFFICE WILL CALL TO SCHEDULE 1 WEEK HOSPITAL FOLLOW UP APPOINTMENT  Contact information:  [address] [address]  [phone]                   PENDING TEST RESULTS AT DISCHARGE      Time: >30 minutes was spent in discharge planning, medication reconciliation and coordination of care for this patient.    Darius William MD is the attending at time of discharge, He is aware of the patient's status and agrees with the above discharge summary.      Danna Medel M.D. PGY2  [facility] Medicine [facility]  [address] [place] [address]  Office: [phone]  This document has  been electronically signed by Danna Medel MD on July 14, 2020 19:52      Dictated utilizing Dragon dictation.

## 2020-07-12 NOTE — ACP (ADVANCE CARE PLANNING)
Patient voiced desire to be a   Full code  Patient nominated Smita Messina  to be their Healthcare surrogate,  contact information is     Signed,   Art Wheeler MD  Family Medicine Resident PGY3  [facility]        This document has been electronically signed by Art Wheeler MD on July 12, 2020 00:17

## 2020-07-15 DIAGNOSIS — D47.1 MPN (MYELOPROLIFERATIVE NEOPLASM) (HCC): Primary | ICD-10-CM

## 2020-07-17 ENCOUNTER — APPOINTMENT (OUTPATIENT)
Dept: ONCOLOGY | Facility: CLINIC | Age: 65
End: 2020-07-17

## 2020-07-17 ENCOUNTER — APPOINTMENT (OUTPATIENT)
Dept: ONCOLOGY | Facility: HOSPITAL | Age: 65
End: 2020-07-17

## 2020-07-21 ENCOUNTER — LAB (OUTPATIENT)
Dept: ONCOLOGY | Facility: HOSPITAL | Age: 65
End: 2020-07-21

## 2020-07-21 ENCOUNTER — TELEMEDICINE (OUTPATIENT)
Dept: ONCOLOGY | Facility: CLINIC | Age: 65
End: 2020-07-21

## 2020-07-21 DIAGNOSIS — D47.1 MPN (MYELOPROLIFERATIVE NEOPLASM) (HCC): ICD-10-CM

## 2020-07-21 DIAGNOSIS — D47.1 MPN (MYELOPROLIFERATIVE NEOPLASM) (HCC): Primary | ICD-10-CM

## 2020-07-21 DIAGNOSIS — D64.9 ANEMIA, UNSPECIFIED TYPE: ICD-10-CM

## 2020-07-21 DIAGNOSIS — D75.839 THROMBOCYTOSIS: ICD-10-CM

## 2020-07-21 LAB
ALBUMIN SERPL-MCNC: 4.8 G/DL (ref 3.5–5.2)
ALBUMIN/GLOB SERPL: 2.5 G/DL
ALP SERPL-CCNC: 44 U/L (ref 39–117)
ALT SERPL W P-5'-P-CCNC: 24 U/L (ref 1–41)
ANION GAP SERPL CALCULATED.3IONS-SCNC: 9 MMOL/L (ref 5–15)
AST SERPL-CCNC: 21 U/L (ref 1–40)
BASOPHILS # BLD AUTO: 0.16 10*3/MM3 (ref 0–0.2)
BASOPHILS NFR BLD AUTO: 1.8 % (ref 0–1.5)
BILIRUB SERPL-MCNC: 0.9 MG/DL (ref 0–1.2)
BUN SERPL-MCNC: 14 MG/DL (ref 8–23)
BUN/CREAT SERPL: 15.1 (ref 7–25)
CALCIUM SPEC-SCNC: 9.7 MG/DL (ref 8.6–10.5)
CHLORIDE SERPL-SCNC: 101 MMOL/L (ref 98–107)
CO2 SERPL-SCNC: 27 MMOL/L (ref 22–29)
CREAT SERPL-MCNC: 0.93 MG/DL (ref 0.76–1.27)
DEPRECATED RDW RBC AUTO: 99.7 FL (ref 37–54)
EOSINOPHIL # BLD AUTO: 0.15 10*3/MM3 (ref 0–0.4)
EOSINOPHIL NFR BLD AUTO: 1.7 % (ref 0.3–6.2)
ERYTHROCYTE [DISTWIDTH] IN BLOOD BY AUTOMATED COUNT: 28.1 % (ref 12.3–15.4)
GFR SERPL CREATININE-BSD FRML MDRD: 82 ML/MIN/1.73
GLOBULIN UR ELPH-MCNC: 1.9 GM/DL
GLUCOSE SERPL-MCNC: 94 MG/DL (ref 65–99)
HCT VFR BLD AUTO: 29.4 % (ref 37.5–51)
HGB BLD-MCNC: 10.2 G/DL (ref 13–17.7)
IMM GRANULOCYTES # BLD AUTO: 0.04 10*3/MM3 (ref 0–0.05)
IMM GRANULOCYTES NFR BLD AUTO: 0.4 % (ref 0–0.5)
LYMPHOCYTES # BLD AUTO: 1.64 10*3/MM3 (ref 0.7–3.1)
LYMPHOCYTES NFR BLD AUTO: 18.4 % (ref 19.6–45.3)
MCH RBC QN AUTO: 34.3 PG (ref 26.6–33)
MCHC RBC AUTO-ENTMCNC: 34.7 G/DL (ref 31.5–35.7)
MCV RBC AUTO: 99 FL (ref 79–97)
MONOCYTES # BLD AUTO: 0.4 10*3/MM3 (ref 0.1–0.9)
MONOCYTES NFR BLD AUTO: 4.5 % (ref 5–12)
NEUTROPHILS NFR BLD AUTO: 6.54 10*3/MM3 (ref 1.7–7)
NEUTROPHILS NFR BLD AUTO: 73.2 % (ref 42.7–76)
NRBC BLD AUTO-RTO: 0.2 /100 WBC (ref 0–0.2)
PLATELET # BLD AUTO: 516 10*3/MM3 (ref 140–450)
PMV BLD AUTO: 9.9 FL (ref 6–12)
POTASSIUM SERPL-SCNC: 4.4 MMOL/L (ref 3.5–5.2)
PROT SERPL-MCNC: 6.7 G/DL (ref 6–8.5)
RBC # BLD AUTO: 2.97 10*6/MM3 (ref 4.14–5.8)
SODIUM SERPL-SCNC: 137 MMOL/L (ref 136–145)
WBC # BLD AUTO: 8.93 10*3/MM3 (ref 3.4–10.8)

## 2020-07-21 PROCEDURE — 85025 COMPLETE CBC W/AUTO DIFF WBC: CPT

## 2020-07-21 PROCEDURE — 80053 COMPREHEN METABOLIC PANEL: CPT

## 2020-07-21 PROCEDURE — 99214 OFFICE O/P EST MOD 30 MIN: CPT | Performed by: INTERNAL MEDICINE

## 2020-07-21 PROCEDURE — 36415 COLL VENOUS BLD VENIPUNCTURE: CPT | Performed by: INTERNAL MEDICINE

## 2020-07-21 NOTE — PROGRESS NOTES
DATE OF VISIT: 7/21/2020    You have chosen to receive care through a telehealth visit.  Do you consent to use a video/audio connection for your medical care today? 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  Patient has been found to have overlap syndrome consisting of MPN/MDS.  Patient is currently on hydroxyurea 2000 mg on Monday Wednesday Friday and rest of the week is taking 1500 mg p.o. daily.  Patient was started on anagrelide 1 g twice daily in beginning of July but he could not tolerate more than 1 day of anagrelide secondary to severe palpitation.  After consultation with Dr. Arredondo at [place] he has started back on hydroxyurea.  Patient was recently discharged from hospital on July 12, 2020 where he was admitted for chest pain and palpitation.  Denies any more palpitation.  Denies any bleeding.  Denies any ankle ulceration.        PAST MEDICAL HISTORY:    Past Medical History:   Diagnosis Date   • Chest pain 7/11/2020    -Likely 2/2 to anxiety.  -SOLOMON therapy -Troponin negative x2. Refusing another lab draw for 3rd troponin. -Cardiac telemetry -CTA coronary showed calcium score 70. Calcified plaque in mid RCA approaching 70% narrowing. Per Dr. Newman's interpretation.   • Hepatitis B    • Hypertension    • MPN (myeloproliferative neoplasm) (CMS/HCC)    • Sinus infection        SOCIAL HISTORY:    Social History     Tobacco Use   • Smoking status: Former Smoker   • Smokeless tobacco: Never Used   • Tobacco comment: quit in early 21 years old   Substance Use Topics   • Alcohol use: Yes     Alcohol/week: 1.0 standard drinks     Types: 1 Cans of beer per week     Comment: occasionally   • Drug use: Never        Surgical History :  Past Surgical History:   Procedure Laterality Date   • COLONOSCOPY     • HEMORRHOID BANDING         ALLERGIES:    Allergies   Allergen Reactions   • Other Rash     Pt was working with walnut wood and broke out in a rash   • Tetracycline Rash         FAMILY HISTORY:  Family History   Problem Relation Age of Onset   • Diabetes Father    • Cirrhosis Father    • Cancer Sister    • Cancer Maternal Uncle            REVIEW OF SYSTEMS:      CONSTITUTIONAL:  Complains of fatigue. Denies any fever, chills or weight loss.     EYES: No visual disturbances. No discharge. No new lesions    ENMT:  No epistaxis, mouth sores or difficulty swallowing.    RESPIRATORY:  No new shortness of breath. No new cough or hemoptysis.    CARDIOVASCULAR: States his palpitation is resolved since hospital discharge.  No chest pain .    GASTROINTESTINAL:  No abdominal pain nausea, vomiting or blood in the stool.    GENITOURINARY: No Dysuria or Hematuria.    MUSCULOSKELETAL: Positive for arthritis pain.    LYMPHATICS:  Denies any abnormal swollen glands anywhere in the body.    NEUROLOGICAL : No tingling or numbness. No headache or dizziness. No seizures or balance problems.    SKIN: No new skin lesions.    ENDOCRINE : No new hot or cold intolerance. No new polyuria . No polydipsia.        PHYSICAL EXAMINATION:      Unable to perform secondary to video connection issue        DIAGNOSTIC DATA:    Glucose   Date Value Ref Range Status   07/21/2020 94 65 - 99 mg/dL Final     Sodium   Date Value Ref Range Status   07/21/2020 137 136 - 145 mmol/L Final     Potassium   Date Value Ref Range Status   07/21/2020 4.4 3.5 - 5.2 mmol/L Final     CO2   Date Value Ref Range Status   07/21/2020 27.0 22.0 - 29.0 mmol/L Final     Chloride   Date Value Ref Range Status   07/21/2020 101 98 - 107 mmol/L Final     Anion Gap   Date Value Ref Range Status   07/21/2020 9.0 5.0 - 15.0 mmol/L Final     Creatinine   Date Value Ref Range Status    07/21/2020 0.93 0.76 - 1.27 mg/dL Final     BUN   Date Value Ref Range Status   07/21/2020 14 8 - 23 mg/dL Final     BUN/Creatinine Ratio   Date Value Ref Range Status   07/21/2020 15.1 7.0 - 25.0 Final     Calcium   Date Value Ref Range Status   07/21/2020 9.7 8.6 - 10.5 mg/dL Final     eGFR Non  Amer   Date Value Ref Range Status   07/21/2020 82 >60 mL/min/1.73 Final     Alkaline Phosphatase   Date Value Ref Range Status   07/21/2020 44 39 - 117 U/L Final     Total Protein   Date Value Ref Range Status   07/21/2020 6.7 6.0 - 8.5 g/dL Final     ALT (SGPT)   Date Value Ref Range Status   07/21/2020 24 1 - 41 U/L Final     AST (SGOT)   Date Value Ref Range Status   07/21/2020 21 1 - 40 U/L Final     Total Bilirubin   Date Value Ref Range Status   07/21/2020 0.9 0.0 - 1.2 mg/dL Final     Albumin   Date Value Ref Range Status   07/21/2020 4.80 3.50 - 5.20 g/dL Final     Globulin   Date Value Ref Range Status   07/21/2020 1.9 gm/dL Final     Lab Results   Component Value Date    WBC 8.93 07/21/2020    HGB 10.2 (L) 07/21/2020    HCT 29.4 (L) 07/21/2020    MCV 99.0 (H) 07/21/2020     (H) 07/21/2020     Lab Results   Component Value Date    NEUTROABS 6.54 07/21/2020    IRON 109 05/28/2020    TIBC 301 05/28/2020    LABIRON 36 05/28/2020    FERRITIN 687.00 (H) 05/28/2020    LAXTIXAA44 >2,000 (H) 05/28/2020    FOLATE 11.70 05/28/2020     No results found for: , LABCA2, AFPTM, HCGQUANT, , CHROMGRNA, [id number], CEA, REFLABREPO        PATHOLOGY:  * Cannot find OR log *         RADIOLOGY DATA :  CT angiogram of coronary done on July 12, 2020 showed:    IMPRESSION:  Abnormal CT coronary arteriogram.     Calcified plaque mid right coronary artery. There is one area of  narrowing which approaches 70%.  Minimal calcified plaque proximal LAD. No stenosis. Normal  circumflex.         Calcium score 70, low risk for coronary disease.     Normal functional analysis. Computer-assisted calculation of  left  ventricular ejection fraction 63 %.    No radiology results for the last 7 days          ASSESSMENT AND PLAN:      1.  Myeloproliferative neoplasm/MDS overlap syndrome:  - Bone marrow biopsy done at [place] on March 23, 2020 is consistent with MPN/MDS overlap syndrome.  - Currently patient is on hydroxyurea 2000 milligrams on Monday, Wednesday and Friday and rest of the week he is taking 1500 mg daily.  - CBC done today on July 21, 2020 shows hemoglobin is 10.4, platelet count is 516,000.  - Patient was tried on anagrelide 1 g twice daily in beginning of July 2020 but he could not tolerate more than 1 day of anagrelide secondary to severe palpitation.  - After his consultation with Dr. Arredondo, he is currently staying on hydroxyurea.  -Records were obtained and reviewed from [facility].  They were also contemplating bone marrow transplant due to his young age.  - For now we will ask patient to return to clinic in 4 weeks with a repeat CBC and CMP on that day.    2.  Anemia and thrombocytosis:  -Secondary to overlap MDS/MPN syndrome plus hydroxyurea.  -We will continue with clinical monitoring.    3.  Palpitation:  -Since recent CT scan of coronary showing 70% narrowing in mid right coronary artery.  Recommend getting evaluation by cardiologist.    4.  Health maintenance: Patient does not smoke.  Had a colonoscopy in 2001 in [place]    5.  Prescriptions: Patient is a new prescription for hydroxyurea    6. Advance Care Planning: For now patient remains full code and is able to make decisions.  Patient has health care surrogate mentioned on chart.         PHQ-9 Total Score:       Marv Messina reports a pain score of 0.  Given his pain assessment as noted, treatment options were discussed and the following options were decided upon as a follow-up plan to address the patient's pain: No acute intervention required.               This visit was completed as a video visit due to ongoing  pandemic with coronavirus.    Unable to complete visit using a video connection to the patient. A phone visit was used to complete this visits. Total time of discussion was 18 minutes.      Vadim Song MD  7/21/2020  15:37          Part of this note may be an electronic transcription/translation of spoken language to printed text using the Dragon Dictation System.

## 2020-08-04 RX ORDER — HYDROXYUREA 500 MG/1
CAPSULE ORAL
Qty: 90 CAPSULE | Refills: 1 | Status: CANCELLED | OUTPATIENT
Start: 2020-08-04

## 2020-08-04 NOTE — TELEPHONE ENCOUNTER
PATIENT IS GETTING LOW ON MEDICATION     [facility] [phone]    PATIENT PHONE # [phone]

## 2020-08-06 ENCOUNTER — TELEPHONE (OUTPATIENT)
Dept: ONCOLOGY | Facility: CLINIC | Age: 65
End: 2020-08-06

## 2020-08-06 NOTE — TELEPHONE ENCOUNTER
Caller: Marv Messina    Relationship to patient: Self     Best call back number: [phone]    Chief complaint: Pt asked to move lab to 8.17.20 in order to allow enough time to get results prior to F/U appt    Type of visit: Lab    Requested date: 8.17.20    If rescheduling, when is the original appointment: 8.18.20    Additional notes:  Rescheduled Lab per pt request

## 2020-08-17 ENCOUNTER — DOCUMENTATION (OUTPATIENT)
Dept: ONCOLOGY | Facility: HOSPITAL | Age: 65
End: 2020-08-17

## 2020-08-17 ENCOUNTER — TELEPHONE (OUTPATIENT)
Dept: ONCOLOGY | Facility: CLINIC | Age: 65
End: 2020-08-17

## 2020-08-17 ENCOUNTER — LAB (OUTPATIENT)
Dept: ONCOLOGY | Facility: HOSPITAL | Age: 65
End: 2020-08-17

## 2020-08-17 DIAGNOSIS — D75.839 THROMBOCYTOSIS: ICD-10-CM

## 2020-08-17 DIAGNOSIS — D47.1 MPN (MYELOPROLIFERATIVE NEOPLASM) (HCC): ICD-10-CM

## 2020-08-17 DIAGNOSIS — D64.9 ANEMIA, UNSPECIFIED TYPE: ICD-10-CM

## 2020-08-17 LAB
ALBUMIN SERPL-MCNC: 4.7 G/DL (ref 3.5–5.2)
ALBUMIN/GLOB SERPL: 2.1 G/DL
ALP SERPL-CCNC: 68 U/L (ref 39–117)
ALT SERPL W P-5'-P-CCNC: 27 U/L (ref 1–41)
ANION GAP SERPL CALCULATED.3IONS-SCNC: 10 MMOL/L (ref 5–15)
ANISOCYTOSIS BLD QL: ABNORMAL
AST SERPL-CCNC: 23 U/L (ref 1–40)
BILIRUB SERPL-MCNC: 0.9 MG/DL (ref 0–1.2)
BUN SERPL-MCNC: 12 MG/DL (ref 8–23)
BUN/CREAT SERPL: 14.1 (ref 7–25)
CALCIUM SPEC-SCNC: 9.4 MG/DL (ref 8.6–10.5)
CHLORIDE SERPL-SCNC: 103 MMOL/L (ref 98–107)
CO2 SERPL-SCNC: 26 MMOL/L (ref 22–29)
CREAT SERPL-MCNC: 0.85 MG/DL (ref 0.76–1.27)
DEPRECATED RDW RBC AUTO: 103.9 FL (ref 37–54)
EOSINOPHIL # BLD MANUAL: 0.04 10*3/MM3 (ref 0–0.4)
EOSINOPHIL NFR BLD MANUAL: 1 % (ref 0.3–6.2)
ERYTHROCYTE [DISTWIDTH] IN BLOOD BY AUTOMATED COUNT: 26.9 % (ref 12.3–15.4)
GFR SERPL CREATININE-BSD FRML MDRD: 91 ML/MIN/1.73
GIANT PLATELETS: ABNORMAL
GLOBULIN UR ELPH-MCNC: 2.2 GM/DL
GLUCOSE SERPL-MCNC: 112 MG/DL (ref 65–99)
HCT VFR BLD AUTO: 24.9 % (ref 37.5–51)
HGB BLD-MCNC: 8.6 G/DL (ref 13–17.7)
HYPOCHROMIA BLD QL: ABNORMAL
LYMPHOCYTES # BLD MANUAL: 1.85 10*3/MM3 (ref 0.7–3.1)
LYMPHOCYTES NFR BLD MANUAL: 4 % (ref 5–12)
LYMPHOCYTES NFR BLD MANUAL: 45 % (ref 19.6–45.3)
MCH RBC QN AUTO: 37.4 PG (ref 26.6–33)
MCHC RBC AUTO-ENTMCNC: 34.5 G/DL (ref 31.5–35.7)
MCV RBC AUTO: 108.3 FL (ref 79–97)
MONOCYTES # BLD AUTO: 0.16 10*3/MM3 (ref 0.1–0.9)
NEUTROPHILS # BLD AUTO: 1.6 10*3/MM3 (ref 1.7–7)
NEUTROPHILS NFR BLD MANUAL: 39 % (ref 42.7–76)
NRBC SPEC MANUAL: 1 /100 WBC (ref 0–0.2)
PLATELET # BLD AUTO: 467 10*3/MM3 (ref 140–450)
PMV BLD AUTO: 10.9 FL (ref 6–12)
POLYCHROMASIA BLD QL SMEAR: ABNORMAL
POTASSIUM SERPL-SCNC: 4.7 MMOL/L (ref 3.5–5.2)
PROT SERPL-MCNC: 6.9 G/DL (ref 6–8.5)
RBC # BLD AUTO: 2.3 10*6/MM3 (ref 4.14–5.8)
SMALL PLATELETS BLD QL SMEAR: ABNORMAL
SODIUM SERPL-SCNC: 139 MMOL/L (ref 136–145)
VARIANT LYMPHS NFR BLD MANUAL: 11 % (ref 0–5)
WBC # BLD AUTO: 4.1 10*3/MM3 (ref 3.4–10.8)
WBC MORPH BLD: NORMAL

## 2020-08-17 PROCEDURE — 36415 COLL VENOUS BLD VENIPUNCTURE: CPT | Performed by: INTERNAL MEDICINE

## 2020-08-17 PROCEDURE — 85007 BL SMEAR W/DIFF WBC COUNT: CPT | Performed by: INTERNAL MEDICINE

## 2020-08-17 PROCEDURE — 85025 COMPLETE CBC W/AUTO DIFF WBC: CPT | Performed by: INTERNAL MEDICINE

## 2020-08-17 PROCEDURE — 80053 COMPREHEN METABOLIC PANEL: CPT | Performed by: INTERNAL MEDICINE

## 2020-08-17 NOTE — TELEPHONE ENCOUNTER
PT WOULD LIKE HIS PLATELET COUNT FROM TODAY'S LABS DONE IN THE OFFICE.    PLEASE GIVE HIM A CALL.    BEST CALL BACK # [phone]

## 2020-08-17 NOTE — TELEPHONE ENCOUNTER
Called and spoke with pt regarding his lab results from today, pt is requesting his lab results be faxed to him.

## 2020-08-18 ENCOUNTER — APPOINTMENT (OUTPATIENT)
Dept: ONCOLOGY | Facility: HOSPITAL | Age: 65
End: 2020-08-18

## 2020-08-18 ENCOUNTER — OFFICE VISIT (OUTPATIENT)
Dept: ONCOLOGY | Facility: CLINIC | Age: 65
End: 2020-08-18

## 2020-08-18 VITALS
TEMPERATURE: 98.5 F | WEIGHT: 189.6 LBS | SYSTOLIC BLOOD PRESSURE: 175 MMHG | DIASTOLIC BLOOD PRESSURE: 61 MMHG | HEIGHT: 72 IN | HEART RATE: 95 BPM | RESPIRATION RATE: 18 BRPM | BODY MASS INDEX: 25.68 KG/M2

## 2020-08-18 DIAGNOSIS — D75.839 THROMBOCYTOSIS: ICD-10-CM

## 2020-08-18 DIAGNOSIS — D64.9 ANEMIA, UNSPECIFIED TYPE: ICD-10-CM

## 2020-08-18 DIAGNOSIS — D47.1 MPN (MYELOPROLIFERATIVE NEOPLASM) (HCC): Primary | ICD-10-CM

## 2020-08-18 DIAGNOSIS — R53.83 OTHER FATIGUE: ICD-10-CM

## 2020-08-18 PROCEDURE — 99214 OFFICE O/P EST MOD 30 MIN: CPT | Performed by: INTERNAL MEDICINE

## 2020-08-18 PROCEDURE — G0463 HOSPITAL OUTPT CLINIC VISIT: HCPCS | Performed by: INTERNAL MEDICINE

## 2020-08-18 NOTE — PROGRESS NOTES
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  Eventually patient was diagnosed with MPN/MDS overlap syndrome.  Patient is currently on hydroxyurea 2000 mg daily on Monday Wednesday and Friday, rest of the week is taking hydroxyurea 1500 mg p.o. daily.  Had a blood work done yesterday, is here to discuss the results and further recommendation.  Complains of worsening fatigue.  Complains of mouth sores.  Complains of shortness of breath with exertion.  Denies any ankle ulceration.          PAST MEDICAL HISTORY:    Past Medical History:   Diagnosis Date   • Chest pain 7/11/2020    -Likely 2/2 to anxiety.  -SOLOMON therapy -Troponin negative x2. Refusing another lab draw for 3rd troponin. -Cardiac telemetry -CTA coronary showed calcium score 70. Calcified plaque in mid RCA approaching 70% narrowing. Per Dr. Newman's interpretation.   • Hepatitis B    • Hypertension    • MPN (myeloproliferative neoplasm) (CMS/HCC)    • Sinus infection        SOCIAL HISTORY:    Social History     Tobacco Use   • Smoking status: Former Smoker   • Smokeless tobacco: Never Used   • Tobacco comment: quit in early 21 years old   Substance Use Topics   • Alcohol use: Yes     Alcohol/week: 1.0 standard drinks     Types: 1 Cans of beer per week     Comment: occasionally   • Drug use: Never       Surgical History :  Past Surgical History:   Procedure Laterality Date   • COLONOSCOPY     • HEMORRHOID BANDING         ALLERGIES:    Allergies   Allergen Reactions   • Other Rash     Pt was working with [employer] wood and broke out in a rash   • Tetracycline Rash         FAMILY HISTORY:  Family History   Problem Relation Age of Onset   •  "Diabetes Father    • Cirrhosis Father    • Cancer Sister    • Cancer Maternal Uncle            REVIEW OF SYSTEMS:      CONSTITUTIONAL:  Complains of fatigue. Denies any fever, chills or weight loss.     EYES: No visual disturbances. No discharge. No new lesions    ENMT: Complains of mouth sores.  No epistaxis or difficulty swallowing.    RESPIRATORY: Positive for shortness of breath with exertion. No new cough or hemoptysis.    CARDIOVASCULAR:  No chest pain or palpitations.    GASTROINTESTINAL:  No abdominal pain nausea, vomiting or blood in the stool.    GENITOURINARY: No Dysuria or Hematuria.    MUSCULOSKELETAL: Positive for chronic arthritis pain.    LYMPHATICS:  Denies any abnormal swollen glands anywhere in the body.    NEUROLOGICAL : No tingling or numbness. No headache or dizziness. No seizures or balance problems.    SKIN: No new skin lesions.    ENDOCRINE : No new hot or cold intolerance. No new polyuria . No polydipsia.        PHYSICAL EXAMINATION:      VITAL SIGNS:  /61   Pulse 95   Temp 98.5 °F (36.9 °C) (Temporal)   Resp 18   Ht 182.9 cm (72.01\")   Wt 86 kg (189 lb 9.6 oz)   BMI 25.71 kg/m²       08/18/20  1134   Weight: 86 kg (189 lb 9.6 oz)         ECOG performance status: 1    CONSTITUTIONAL:  Not in any distress.    EYES: Mild conjunctival Pallor. No Icterus. No Pterygium. Extraocular Movements intact.No ptosis.    ENMT:  Normocephalic, Atraumatic.No Facial Asymmetry noted.  Mouth sore present on floor of mouth.    NECK:  No adenopathy.Trachea midline. NO JVD.    RESPIRATORY:  Fair air entry bilateral. No rhonchi or wheezing.Fair respiratory effort.    CARDIOVASCULAR:  S1, S2. Regular rate and rhythm. No murmur or gallop appreciated.    ABDOMEN:  Soft, obese, nontender. Bowel sounds present in all four quadrants.  No Hepatosplenomegaly appreciated.    MUSCULOSKELETAL:  No edema.No Calf Tenderness.Normal range of motion.    NEUROLOGIC:    No  Motor or sensory deficit appreciated. Cranial " Nerves 2-12 grossly intact.    SKIN : No new skin lesion identified. Skin is warm and moist to touch.    LYMPHATICS: No new enlarged lymph nodes in neck or supraclavicular area.    PSYCHIATRY: Alert, awake and oriented ×3.  Appears anxious.  Normal judgment.  Makes good eye contact.        DIAGNOSTIC DATA:    Glucose   Date Value Ref Range Status   08/17/2020 112 (H) 65 - 99 mg/dL Final     Sodium   Date Value Ref Range Status   08/17/2020 139 136 - 145 mmol/L Final     Potassium   Date Value Ref Range Status   08/17/2020 4.7 3.5 - 5.2 mmol/L Final     CO2   Date Value Ref Range Status   08/17/2020 26.0 22.0 - 29.0 mmol/L Final     Chloride   Date Value Ref Range Status   08/17/2020 103 98 - 107 mmol/L Final     Anion Gap   Date Value Ref Range Status   08/17/2020 10.0 5.0 - 15.0 mmol/L Final     Creatinine   Date Value Ref Range Status   08/17/2020 0.85 0.76 - 1.27 mg/dL Final     BUN   Date Value Ref Range Status   08/17/2020 12 8 - 23 mg/dL Final     BUN/Creatinine Ratio   Date Value Ref Range Status   08/17/2020 14.1 7.0 - 25.0 Final     Calcium   Date Value Ref Range Status   08/17/2020 9.4 8.6 - 10.5 mg/dL Final     eGFR Non  Amer   Date Value Ref Range Status   08/17/2020 91 >60 mL/min/1.73 Final     Alkaline Phosphatase   Date Value Ref Range Status   08/17/2020 68 39 - 117 U/L Final     Total Protein   Date Value Ref Range Status   08/17/2020 6.9 6.0 - 8.5 g/dL Final     ALT (SGPT)   Date Value Ref Range Status   08/17/2020 27 1 - 41 U/L Final     AST (SGOT)   Date Value Ref Range Status   08/17/2020 23 1 - 40 U/L Final     Total Bilirubin   Date Value Ref Range Status   08/17/2020 0.9 0.0 - 1.2 mg/dL Final     Albumin   Date Value Ref Range Status   08/17/2020 4.70 3.50 - 5.20 g/dL Final     Globulin   Date Value Ref Range Status   08/17/2020 2.2 gm/dL Final     Lab Results   Component Value Date    WBC 4.10 08/17/2020    HGB 8.6 (L) 08/17/2020    HCT 24.9 (L) 08/17/2020    .3 (H) 08/17/2020      (H) 08/17/2020     Lab Results   Component Value Date    NEUTROABS 1.60 (L) 08/17/2020    IRON 109 05/28/2020    TIBC 301 05/28/2020    LABIRON 36 05/28/2020    FERRITIN 687.00 (H) 05/28/2020    IOYDWLXL07 >2,000 (H) 05/28/2020    FOLATE 11.70 05/28/2020     No results found for: , LABCA2, AFPTM, HCGQUANT, , CHROMGRNA, [id number], CEA, REFLABREPO        ASSESSMENT AND PLAN:      1.  MPN/MDS overlap syndrome:  - Bone marrow biopsy done at [facility] on March 23, 2020 is consistent with MPN/MDS overlap syndrome.  - Patient is currently on hydroxyurea 2000 mg on Monday Wednesday Friday and rest of the week is taking 1500 mg p.o. daily  -CBC done on August 17, 2020 shows worsening anemia with a hemoglobin of 8.6, neutropenia with white blood cell of 4.1 and absolute neutrophil count of 1.6, platelet count of 167,000.  -Patient was tried on anagrelide but he could not tolerate it secondary to palpitations.  - Recommend decreasing hydroxyurea to 1500 mg p.o. daily.  - We will recheck CBC in 2 weeks, if he continues to have significant cytopenia will continue titrating hydroxyurea dose down which was discussed with patient  - Also recommend continuing following up with Dr. Arredondo at [facility].  -We will ask patient to return to clinic in 4 weeks with repeat CBC and CMP on that day.    2.  Anemia, neutropenia and thrombocytosis secondary to MPN/MDS overlap syndrome and hydroxyurea.  -We will monitor with CBC    3.  Mouth sores:  -Secondary to hydroxyurea.  We will start patient on Magic mouthwash.    4.  Health maintenance: Patient does not smoke.      5.  Prescriptions: Patient is seen after excision of hydroxyurea    6. Advance Care Planning: For now patient remains full code and is able to make decisions.  Patient has health care surrogate mentioned on chart.           PHQ-9 Total Score: 0   -Patient is not homicidal or suicidal.  No acute intervention required.    Marv Messina reports  a pain score of 0.  Given his pain assessment as noted, treatment options were discussed and the following options were decided upon as a follow-up plan to address the patient's pain: continuation of current treatment plan for pain.         Vadim Song MD  8/18/2020  11:53        Part of this note may be an electronic transcription/translation of spoken language to printed text using the Dragon Dictation System.

## 2020-08-25 DIAGNOSIS — R07.9 CHEST PAIN, UNSPECIFIED TYPE: Primary | ICD-10-CM

## 2020-08-26 ENCOUNTER — OFFICE VISIT (OUTPATIENT)
Dept: CARDIOLOGY | Facility: CLINIC | Age: 65
End: 2020-08-26

## 2020-08-26 VITALS
OXYGEN SATURATION: 99 % | BODY MASS INDEX: 27.14 KG/M2 | HEIGHT: 72 IN | DIASTOLIC BLOOD PRESSURE: 80 MMHG | SYSTOLIC BLOOD PRESSURE: 140 MMHG | WEIGHT: 200.4 LBS | HEART RATE: 85 BPM

## 2020-08-26 DIAGNOSIS — R00.2 PALPITATIONS: ICD-10-CM

## 2020-08-26 DIAGNOSIS — I25.10 ATHEROSCLEROSIS OF NATIVE CORONARY ARTERY OF NATIVE HEART WITHOUT ANGINA PECTORIS: ICD-10-CM

## 2020-08-26 DIAGNOSIS — D47.1 MPN (MYELOPROLIFERATIVE NEOPLASM) (HCC): ICD-10-CM

## 2020-08-26 DIAGNOSIS — R07.2 PRECORDIAL PAIN: Primary | ICD-10-CM

## 2020-08-26 DIAGNOSIS — I10 ESSENTIAL HYPERTENSION: ICD-10-CM

## 2020-08-26 PROCEDURE — 93000 ELECTROCARDIOGRAM COMPLETE: CPT | Performed by: INTERNAL MEDICINE

## 2020-08-26 PROCEDURE — 99244 OFF/OP CNSLTJ NEW/EST MOD 40: CPT | Performed by: INTERNAL MEDICINE

## 2020-08-26 RX ORDER — METOPROLOL SUCCINATE 25 MG/1
25 TABLET, EXTENDED RELEASE ORAL DAILY
Qty: 90 TABLET | Refills: 3 | Status: SHIPPED | OUTPATIENT
Start: 2020-08-26 | End: 2021-04-07

## 2020-08-26 NOTE — PROGRESS NOTES
Marv Messina  65 y.o. male    08/26/2020  1. Precordial pain    2. Atherosclerosis of native coronary artery of native heart without angina pectoris    3. MPN (myeloproliferative neoplasm) (CMS/HCC)    4. Essential hypertension        History of Present Illness  Marv Messina is a 65-year-old male with history of myeloproliferative neoplasm, thrombocytosis, hypertension, anxiety, who was admitted to [facility] with chest pain and palpitation in July 2020.  Patient however denies any chest pain during that admission though his hospital records do mention this.  Cardiac enzymes were negative for myocardial injury.  EKG showed no acute ST-T wave changes.  Subsequent work-up included a CT angiogram of the coronary arteries which showed the following findings:  Ejection Fraction      63    %  Diastolic Volume      158    ml  Systolic Volume       58    ml  Stroke Volume         100    ml  Cardiac Output        6.9    L/minute     IMPRESSION:  Abnormal CT coronary arteriogram.     Calcified plaque mid right coronary artery. There is one area of  narrowing which approaches 70%.  Minimal calcified plaque proximal LAD. No stenosis. Normal  circumflex.   Calcium score 70, low risk for coronary disease.  Normal functional analysis. Computer-assisted calculation of left  ventricular ejection fraction 63 %.    Echocardiogram showed:  · Left ventricular wall thickness is consistent with mild concentric hypertrophy.  · Estimated EF = 61%.  · Left ventricular systolic function is normal.  · Left ventricular diastolic dysfunction (grade I) consistent with impaired relaxation.    EKG today showed sinus rhythm with heart rate of 85 bpm.  No ST-T wave changes suggestive of ischemia.    The patient denies any chest pain or palpitation and attributes her symptoms to anxiety attack back in July.  Overall he has felt good.  He is more concerned about his hematological malignancy.  Clinical exam today was  unremarkable.  Blood pressure was borderline elevated today.  This has been elevated in the past.  No signs of congestive heart failure was noted.    SUBJECTIVE    Allergies   Allergen Reactions   • Other Rash     Pt was working with [employer] and broke out in a rash   • Tetracycline Rash         Past Medical History:   Diagnosis Date   • Chest pain 7/11/2020    -Likely 2/2 to anxiety.  -SOLOMON therapy -Troponin negative x2. Refusing another lab draw for 3rd troponin. -Cardiac telemetry -CTA coronary showed calcium score 70. Calcified plaque in mid RCA approaching 70% narrowing. Per Dr. Newman's interpretation.   • Hepatitis B    • Hypertension    • MPN (myeloproliferative neoplasm) (CMS/HCC)    • Sinus infection          Past Surgical History:   Procedure Laterality Date   • COLONOSCOPY     • HEMORRHOID BANDING           Family History   Problem Relation Age of Onset   • Diabetes Father    • Cirrhosis Father    • Cancer Sister    • Cancer Maternal Uncle          Social History     Socioeconomic History   • Marital status:      Spouse name: Not on file   • Number of children: Not on file   • Years of education: Not on file   • Highest education level: Not on file   Tobacco Use   • Smoking status: Former Smoker   • Smokeless tobacco: Never Used   • Tobacco comment: quit in early 21 years old   Substance and Sexual Activity   • Alcohol use: Yes     Alcohol/week: 1.0 standard drinks     Types: 1 Cans of beer per week     Comment: occasionally   • Drug use: Never   • Sexual activity: Defer         Current Outpatient Medications   Medication Sig Dispense Refill   • aspirin (aspirin) 81 MG EC tablet Take 1 tablet by mouth Daily. 30 tablet 0   • BLACK ELDERBERRY PO Take  by mouth. 1 tablespoon daily     • Cyanocobalamin (VITAMIN B 12 PO) Take 1 tablet by mouth Daily.     • docusate sodium (COLACE) 100 MG capsule Take 100 mg by mouth 2 (Two) Times a Day As Needed.     • folic acid (FOLVITE) 1 MG tablet 1 tablet by  "mouth on Monday, Wednesday and Friday 36 tablet 1   • hydroxyurea (HYDREA) 500 MG capsule Take 1 capsule in the morning and 2 capsules at night 90 capsule 1   • hydrOXYzine pamoate (Vistaril) 25 MG capsule Take 1 capsule by mouth 3 (Three) Times a Day As Needed for Anxiety. 30 capsule 0   • NON FORMULARY Daily. kiolic garlic     • sertraline (ZOLOFT) 25 MG tablet Take 1 tablet by mouth Daily. 30 tablet 0   • VITAMIN C, CALCIUM ASCORBATE, PO Take  by mouth. 2 g of powder daily       No current facility-administered medications for this visit.          OBJECTIVE    /80   Pulse 85   Ht 182.9 cm (72\")   Wt 90.9 kg (200 lb 6.4 oz)   SpO2 99%   BMI 27.18 kg/m²         Review of Systems     Constitutional:  Denies recent weight loss, weight gain, fever or chills, no change in exercise tolerance     HENT:  Denies any hearing loss, epistaxis, hoarseness, or difficulty speaking.     Eyes: Wears eyeglasses or contact lenses     Respiratory:  Denies dyspnea with exertion,no cough, wheezing, or hemoptysis.     Cardiovascular: Negative for palpations, chest pain, orthopnea, PND, peripheral edema, syncope, or claudication.     Gastrointestinal:  Denies change in bowel habits, dyspepsia, ulcer disease, hematochezia, or melena. ? hepatitis B, positive antibodies for hepatitis C    Endocrine: Negative for cold intolerance, heat intolerance, polydipsia, polyphagia and polyuria. Denies any history of weight change, heat/cold intolerance, polydipsia, polyuria     Genitourinary: Negative.      Musculoskeletal: Denies any history of arthritic symptoms or back problems     Skin:  Denies any change in hair or nails, rashes, or skin lesions.     Allergic/Immunologic: Negative.  Negative for environmental allergies, food allergies and immunocompromised state.     Neurological: History of myeloproliferative syndrome.  Anemia, thrombocytosis.    Hematological: Denies any food allergies, seasonal allergies, bleeding disorders, or " lymphadenopathy.     Psychiatric/Behavioral: Denies any history of depression, substance abuse, or change in cognitive function.         Physical Exam     Constitutional: Cooperative, alert and oriented,  in no acute distress.     HENT:   Head: Normocephalic, normal hair patterns, no masses or tenderness.  Ears, Nose, and Throat: No gross abnormalities. No pallor or cyanosis. Dentition good.   Eyes: EOMS intact, PERRL, conjunctivae and lids unremarkable. Fundoscopic exam and visual fields not performed.   Neck: No palpable masses or adenopathy, no thyromegaly, no JVD, carotid pulses are full and equal bilaterally and without  Bruits.     Cardiovascular: Regular rhythm, S1 and S2 normal, no S3 or S4.  No murmurs, gallops, or rubs detected.     Pulmonary/Chest: Chest: normal symmetry, normal respiratory excursion, no intercostal retraction, no use of accessory muscles.            Pulmonary: Normal breath sounds. No rales or ronchi.    Abdominal: Abdomen soft, bowel sounds normoactive, no masses, no hepatosplenomegaly, non-tender, no bruits.     Musculoskeletal: No deformities, clubbing, cyanosis, erythema, or edema observed.     Neurological: No gross motor or sensory deficits noted, affect appropriate, oriented to time, person, place.     Skin: Warm and dry to the touch, no apparent skin lesions or masses noted.     Psychiatric: He has a normal mood and affect. His behavior is normal. Judgment and thought content normal.         Procedures      Lab Results   Component Value Date    WBC 4.10 08/17/2020    HGB 8.6 (L) 08/17/2020    HCT 24.9 (L) 08/17/2020    .3 (H) 08/17/2020     (H) 08/17/2020     Lab Results   Component Value Date    GLUCOSE 112 (H) 08/17/2020    BUN 12 08/17/2020    CREATININE 0.85 08/17/2020    EGFRIFNONA 91 08/17/2020    BCR 14.1 08/17/2020    CO2 26.0 08/17/2020    CALCIUM 9.4 08/17/2020    ALBUMIN 4.70 08/17/2020    AST 23 08/17/2020    ALT 27 08/17/2020     No results found for:  CHOL  No results found for: TRIG  No results found for: HDL  No components found for: LDLCALC  No results found for: LDL  No results found for: HDLLDLRATIO  No components found for: CHOLHDL  No results found for: HGBA1C  Lab Results   Component Value Date    TSH 1.250 07/11/2020           ASSESSMENT AND PLAN  Marv Messina has multiple medical issues as discussed in the history of present illness including myeloproliferative neoplasm, leukocytosis, hypertension who was evaluated for chest pain/ palpitations  and has documented moderate disease in the right coronary artery with normal LV systolic function.  The patient is asymptomatic at the present time and I believe that risk factor modification and optimization of blood pressure would be reasonable.  I have started him on metoprolol succinate 25 mg daily and continued antiplatelet therapy with aspirin.  I believe that he would benefit from statins and the plan would be to check a lipid profile before considering this.  The patient had several questions with regards to his cardiac status and these were explained to him in detail.  He may not be a good candidate for invasive work-up in the future secondary to significant anemia.     Thank you for asked me to see this patient.    Marv was seen today for chest pain.    Diagnoses and all orders for this visit:    Precordial pain    Atherosclerosis of native coronary artery of native heart without angina pectoris    MPN (myeloproliferative neoplasm) (CMS/[facility])    Essential hypertension        Patient's Body mass index is 27.18 kg/m². BMI is above normal parameters. Recommendations include: exercise counseling and nutrition counseling.      Marv Messina  reports that he has quit smoking. He has never used smokeless tobacco..    Christie eMyer MD  8/26/2020  08:20

## 2020-08-28 RX ORDER — FOLIC ACID 1 MG/1
TABLET ORAL
Qty: 36 TABLET | Refills: 1 | Status: SHIPPED | OUTPATIENT
Start: 2020-08-28

## 2020-09-01 ENCOUNTER — LAB (OUTPATIENT)
Dept: ONCOLOGY | Facility: HOSPITAL | Age: 65
End: 2020-09-01

## 2020-09-01 DIAGNOSIS — R07.2 PRECORDIAL PAIN: ICD-10-CM

## 2020-09-01 DIAGNOSIS — D64.9 ANEMIA, UNSPECIFIED TYPE: ICD-10-CM

## 2020-09-01 DIAGNOSIS — D75.839 THROMBOCYTOSIS: ICD-10-CM

## 2020-09-01 DIAGNOSIS — I25.10 ATHEROSCLEROSIS OF NATIVE CORONARY ARTERY OF NATIVE HEART WITHOUT ANGINA PECTORIS: ICD-10-CM

## 2020-09-01 DIAGNOSIS — R53.83 OTHER FATIGUE: ICD-10-CM

## 2020-09-01 DIAGNOSIS — D47.1 MPN (MYELOPROLIFERATIVE NEOPLASM) (HCC): ICD-10-CM

## 2020-09-01 DIAGNOSIS — I10 ESSENTIAL HYPERTENSION: ICD-10-CM

## 2020-09-01 LAB
ALBUMIN SERPL-MCNC: 4.8 G/DL (ref 3.5–5.2)
ALBUMIN/GLOB SERPL: 2.5 G/DL
ALP SERPL-CCNC: 67 U/L (ref 39–117)
ALT SERPL W P-5'-P-CCNC: 37 U/L (ref 1–41)
ANION GAP SERPL CALCULATED.3IONS-SCNC: 10 MMOL/L (ref 5–15)
ANISOCYTOSIS BLD QL: NORMAL
AST SERPL-CCNC: 25 U/L (ref 1–40)
BASOPHILS # BLD AUTO: 0.03 10*3/MM3 (ref 0–0.2)
BASOPHILS NFR BLD AUTO: 0.7 % (ref 0–1.5)
BILIRUB SERPL-MCNC: 1.2 MG/DL (ref 0–1.2)
BUN SERPL-MCNC: 16 MG/DL (ref 8–23)
BUN/CREAT SERPL: 18.4 (ref 7–25)
CALCIUM SPEC-SCNC: 9.1 MG/DL (ref 8.6–10.5)
CHLORIDE SERPL-SCNC: 102 MMOL/L (ref 98–107)
CHOLEST SERPL-MCNC: 144 MG/DL (ref 0–200)
CO2 SERPL-SCNC: 26 MMOL/L (ref 22–29)
CREAT SERPL-MCNC: 0.87 MG/DL (ref 0.76–1.27)
DACRYOCYTES BLD QL SMEAR: NORMAL
DEPRECATED RDW RBC AUTO: 116.2 FL (ref 37–54)
EOSINOPHIL # BLD AUTO: 0.11 10*3/MM3 (ref 0–0.4)
EOSINOPHIL NFR BLD AUTO: 2.5 % (ref 0.3–6.2)
ERYTHROCYTE [DISTWIDTH] IN BLOOD BY AUTOMATED COUNT: 28.6 % (ref 12.3–15.4)
GFR SERPL CREATININE-BSD FRML MDRD: 88 ML/MIN/1.73
GLOBULIN UR ELPH-MCNC: 1.9 GM/DL
GLUCOSE SERPL-MCNC: 111 MG/DL (ref 65–99)
HCT VFR BLD AUTO: 25.1 % (ref 37.5–51)
HDLC SERPL-MCNC: 39 MG/DL (ref 40–60)
HGB BLD-MCNC: 8.4 G/DL (ref 13–17.7)
IMM GRANULOCYTES # BLD AUTO: 0.02 10*3/MM3 (ref 0–0.05)
IMM GRANULOCYTES NFR BLD AUTO: 0.5 % (ref 0–0.5)
LDLC SERPL CALC-MCNC: 90 MG/DL (ref 0–100)
LDLC/HDLC SERPL: 2.31 {RATIO}
LYMPHOCYTES # BLD AUTO: 1.86 10*3/MM3 (ref 0.7–3.1)
LYMPHOCYTES NFR BLD AUTO: 42.3 % (ref 19.6–45.3)
MACROCYTES BLD QL SMEAR: NORMAL
MCH RBC QN AUTO: 38.7 PG (ref 26.6–33)
MCHC RBC AUTO-ENTMCNC: 33.5 G/DL (ref 31.5–35.7)
MCV RBC AUTO: 115.7 FL (ref 79–97)
MONOCYTES # BLD AUTO: 0.33 10*3/MM3 (ref 0.1–0.9)
MONOCYTES NFR BLD AUTO: 7.5 % (ref 5–12)
NEUTROPHILS NFR BLD AUTO: 2.05 10*3/MM3 (ref 1.7–7)
NEUTROPHILS NFR BLD AUTO: 46.5 % (ref 42.7–76)
NRBC BLD AUTO-RTO: 0.7 /100 WBC (ref 0–0.2)
PLATELET # BLD AUTO: 212 10*3/MM3 (ref 140–450)
PMV BLD AUTO: 10 FL (ref 6–12)
POLYCHROMASIA BLD QL SMEAR: NORMAL
POTASSIUM SERPL-SCNC: 4.5 MMOL/L (ref 3.5–5.2)
PROT SERPL-MCNC: 6.7 G/DL (ref 6–8.5)
RBC # BLD AUTO: 2.17 10*6/MM3 (ref 4.14–5.8)
SMALL PLATELETS BLD QL SMEAR: ADEQUATE
SODIUM SERPL-SCNC: 138 MMOL/L (ref 136–145)
TRIGL SERPL-MCNC: 74 MG/DL (ref 0–150)
VLDLC SERPL-MCNC: 14.8 MG/DL
WBC # BLD AUTO: 4.4 10*3/MM3 (ref 3.4–10.8)
WBC MORPH BLD: NORMAL

## 2020-09-01 PROCEDURE — 80061 LIPID PANEL: CPT

## 2020-09-01 PROCEDURE — 80053 COMPREHEN METABOLIC PANEL: CPT

## 2020-09-01 PROCEDURE — 85007 BL SMEAR W/DIFF WBC COUNT: CPT

## 2020-09-01 PROCEDURE — 85025 COMPLETE CBC W/AUTO DIFF WBC: CPT

## 2020-09-01 PROCEDURE — 36415 COLL VENOUS BLD VENIPUNCTURE: CPT | Performed by: INTERNAL MEDICINE

## 2020-09-02 ENCOUNTER — TELEPHONE (OUTPATIENT)
Dept: ONCOLOGY | Facility: HOSPITAL | Age: 65
End: 2020-09-02

## 2020-09-02 NOTE — TELEPHONE ENCOUNTER
Called and spoke with pt regarding lab results and taking medication prescribed by dr. Song.  V/u obtained.

## 2020-09-02 NOTE — TELEPHONE ENCOUNTER
----- Message from Vadim Song MD sent at 9/2/2020  7:56 AM CDT -----  Please let patient know, his hemoglobin is decreased to 8.4.  Recommend decreasing hydroxyurea to 500 mg 1 tablet twice daily that will be total of 1000 mg in a day.  We will recheck blood work when he returns to see me on September 17, 2020.  Thank you

## 2020-09-04 ENCOUNTER — TELEPHONE (OUTPATIENT)
Dept: ONCOLOGY | Facility: CLINIC | Age: 65
End: 2020-09-04

## 2020-09-04 NOTE — TELEPHONE ENCOUNTER
Patient said was supposed to get a Rx for mouthwash with numbing agent(was not sure of name) sent to [facility] [address] [place] [address] [address] AT [facility] [address] - [phone]  - [phone] FX. Said still not showing they have that. Would like this sent to them and call when we have done this at [phone]

## 2020-09-10 ENCOUNTER — TELEPHONE (OUTPATIENT)
Dept: ONCOLOGY | Facility: CLINIC | Age: 65
End: 2020-09-10

## 2020-09-15 ENCOUNTER — TELEPHONE (OUTPATIENT)
Dept: ONCOLOGY | Facility: CLINIC | Age: 65
End: 2020-09-15

## 2020-09-15 ENCOUNTER — LAB (OUTPATIENT)
Dept: ONCOLOGY | Facility: HOSPITAL | Age: 65
End: 2020-09-15

## 2020-09-15 DIAGNOSIS — D47.1 MPN (MYELOPROLIFERATIVE NEOPLASM) (HCC): Primary | ICD-10-CM

## 2020-09-15 DIAGNOSIS — D47.1 MPN (MYELOPROLIFERATIVE NEOPLASM) (HCC): ICD-10-CM

## 2020-09-15 LAB
ALBUMIN SERPL-MCNC: 4.8 G/DL (ref 3.5–5.2)
ALBUMIN/GLOB SERPL: 2.5 G/DL
ALP SERPL-CCNC: 64 U/L (ref 39–117)
ALT SERPL W P-5'-P-CCNC: 34 U/L (ref 1–41)
ANION GAP SERPL CALCULATED.3IONS-SCNC: 11 MMOL/L (ref 5–15)
ANISOCYTOSIS BLD QL: ABNORMAL
AST SERPL-CCNC: 27 U/L (ref 1–40)
BILIRUB SERPL-MCNC: 0.7 MG/DL (ref 0–1.2)
BUN SERPL-MCNC: 15 MG/DL (ref 8–23)
BUN/CREAT SERPL: 18.3 (ref 7–25)
CALCIUM SPEC-SCNC: 9.3 MG/DL (ref 8.6–10.5)
CHLORIDE SERPL-SCNC: 104 MMOL/L (ref 98–107)
CO2 SERPL-SCNC: 24 MMOL/L (ref 22–29)
CREAT SERPL-MCNC: 0.82 MG/DL (ref 0.76–1.27)
DACRYOCYTES BLD QL SMEAR: ABNORMAL
DEPRECATED RDW RBC AUTO: 116.1 FL (ref 37–54)
EOSINOPHIL # BLD MANUAL: 0.08 10*3/MM3 (ref 0–0.4)
EOSINOPHIL NFR BLD MANUAL: 2 % (ref 0.3–6.2)
ERYTHROCYTE [DISTWIDTH] IN BLOOD BY AUTOMATED COUNT: 26.8 % (ref 12.3–15.4)
GFR SERPL CREATININE-BSD FRML MDRD: 95 ML/MIN/1.73
GLOBULIN UR ELPH-MCNC: 1.9 GM/DL
GLUCOSE SERPL-MCNC: 103 MG/DL (ref 65–99)
HCT VFR BLD AUTO: 22.1 % (ref 37.5–51)
HGB BLD-MCNC: 7.3 G/DL (ref 13–17.7)
HYPOCHROMIA BLD QL: ABNORMAL
LYMPHOCYTES # BLD MANUAL: 2.07 10*3/MM3 (ref 0.7–3.1)
LYMPHOCYTES NFR BLD MANUAL: 4 % (ref 5–12)
LYMPHOCYTES NFR BLD MANUAL: 52 % (ref 19.6–45.3)
MACROCYTES BLD QL SMEAR: ABNORMAL
MCH RBC QN AUTO: 40.3 PG (ref 26.6–33)
MCHC RBC AUTO-ENTMCNC: 33 G/DL (ref 31.5–35.7)
MCV RBC AUTO: 122.1 FL (ref 79–97)
MONOCYTES # BLD AUTO: 0.16 10*3/MM3 (ref 0.1–0.9)
NEUTROPHILS # BLD AUTO: 1.47 10*3/MM3 (ref 1.7–7)
NEUTROPHILS NFR BLD MANUAL: 37 % (ref 42.7–76)
NRBC SPEC MANUAL: 1 /100 WBC (ref 0–0.2)
PLATELET # BLD AUTO: 278 10*3/MM3 (ref 140–450)
PMV BLD AUTO: 11.5 FL (ref 6–12)
POTASSIUM SERPL-SCNC: 4.5 MMOL/L (ref 3.5–5.2)
PROT SERPL-MCNC: 6.7 G/DL (ref 6–8.5)
RBC # BLD AUTO: 1.81 10*6/MM3 (ref 4.14–5.8)
SMALL PLATELETS BLD QL SMEAR: ADEQUATE
SODIUM SERPL-SCNC: 139 MMOL/L (ref 136–145)
VARIANT LYMPHS NFR BLD MANUAL: 5 % (ref 0–5)
WBC # BLD AUTO: 3.98 10*3/MM3 (ref 3.4–10.8)
WBC MORPH BLD: NORMAL

## 2020-09-15 PROCEDURE — 36415 COLL VENOUS BLD VENIPUNCTURE: CPT | Performed by: NURSE PRACTITIONER

## 2020-09-15 PROCEDURE — 80053 COMPREHEN METABOLIC PANEL: CPT

## 2020-09-15 PROCEDURE — 85025 COMPLETE CBC W/AUTO DIFF WBC: CPT

## 2020-09-15 PROCEDURE — 85007 BL SMEAR W/DIFF WBC COUNT: CPT

## 2020-09-15 NOTE — TELEPHONE ENCOUNTER
Pt received his lab results via fax but the hemoglobin was smeared. Please call pt with hemoglobin levels.     His ph# is [phone]

## 2020-09-15 NOTE — TELEPHONE ENCOUNTER
PT CALLED TO HAVE TODAY'S LABS FAXED TO HIM FOR THE APPT TOMORROW. PLEASE FAX [phone] ATTN: ROSANA NINA.

## 2020-09-16 ENCOUNTER — APPOINTMENT (OUTPATIENT)
Dept: ONCOLOGY | Facility: HOSPITAL | Age: 65
End: 2020-09-16

## 2020-09-16 ENCOUNTER — OFFICE VISIT (OUTPATIENT)
Dept: ONCOLOGY | Facility: CLINIC | Age: 65
End: 2020-09-16

## 2020-09-16 DIAGNOSIS — D47.1 MPN (MYELOPROLIFERATIVE NEOPLASM) (HCC): Primary | ICD-10-CM

## 2020-09-16 PROCEDURE — 99441 PR PHYS/QHP TELEPHONE EVALUATION 5-10 MIN: CPT | Performed by: NURSE PRACTITIONER

## 2020-09-17 ENCOUNTER — APPOINTMENT (OUTPATIENT)
Dept: ONCOLOGY | Facility: HOSPITAL | Age: 65
End: 2020-09-17

## 2020-09-17 NOTE — PROGRESS NOTES
9/16/2020    This visit has been rescheduled as a phone visit to comply with patient safety concerns in accordance with CDC recommendations. Total time of discussion was 10 minutes.    You have chosen to receive care through a telephone visit. Do you consent to use a telephone visit for your medical care today? 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  Eventually patient was diagnosed with MPN/MDS overlap syndrome.  Patient is currently on hydroxyurea 1000 mg daily. He recently had labs redrawn and having tele health visit today to discuss those results. Mouth sores improving with magic mouthwash.  He states fatigue is about the same; states he does notice that he gets more SOA with exertion like walking up stairs. Denies any chest pain or heart palpitations. He states he is schedule to have tele health visit with Dr. Arredondo at [place] on Monday 9/21/2020.       LABS:    Component      Latest Ref Rng & Units 9/15/2020   WBC      3.40 - 10.80 10*3/mm3 3.98   RBC      4.14 - 5.80 10*6/mm3 1.81 (L)   Hemoglobin      13.0 - 17.7 g/dL 7.3 (L)   Hematocrit      37.5 - 51.0 % 22.1 (L)   MCV      79.0 - 97.0 fL 122.1 (H)   MCH      26.6 - 33.0 pg 40.3 (H)   MCHC      31.5 - 35.7 g/dL 33.0   RDW      12.3 - 15.4 % 26.8 (H)   RDW-SD      37.0 - 54.0 fl 116.1 (H)   MPV      6.0 - 12.0 fL 11.5   Platelets      140 - 450 10*3/mm3 278           Component      Latest Ref Rng & Units 9/15/2020             Neutrophil Rel %      42.7 - 76.0 % 37.0 (L)   Lymphocyte Rel %      19.6 - 45.3 % 52.0 (H)   Monocyte Rel %      5.0 - 12.0 % 4.0 (L)   Eosinophil Rel %      0.3 - 6.2 % 2.0   Atypical Lymphocyte %      0.0 - 5.0 % 5.0   Neutrophils  Absolute      1.70 - 7.00 10*3/mm3 1.47 (L)   Lymphocytes Absolute      0.70 - 3.10 10*3/mm3 2.07   Monocytes Absolute      0.10 - 0.90 10*3/mm3 0.16   Eosinophils Absolute      0.00 - 0.40 10*3/mm3 0.08   nRBC      0.0 - 0.2 /100 WBC 1.0 (H)   Anisocytes      None Seen Mod/2+   Dacrocytes      None Seen Slight/1+   Hypochromia      None Seen Slight/1+   Macrocytes      None Seen Slight/1+   WBC Morphology      Normal Normal   Platelet Estimate      Normal Adequate     ASSESSMENT AND PLAN:    1. MPN/MDS overlap syndrome:  - Bone marrow biopsy done at [place] on March 23, 2020 is consistent with MPN/MDS overlap syndrome.  - Patient is currently on hydroxyurea 2000 mg on Monday Wednesday Friday and rest of the week is taking 1500 mg p.o. daily  -CBC done on August 17, 2020 shows worsening anemia with a hemoglobin of 8.6, neutropenia with white blood cell of 4.1 and absolute neutrophil count of 1.6, platelet count of 167,000.  -Patient was tried on anagrelide but he could not tolerate it secondary to palpitations.  - Most recent hydroyurea dose is 1000 mg daily.  -Recent blood work from 9/15/2020 reviewed with Dr. Song; worsening anemia; will recommend that pt hold Hydrea for one week and then RTC with repeat labs.  -Discussed with patient about possible blood transfusion but pt states he is not wanting to do that at this time.  -He is having a tele health visit with Dr. Arredondo at [place] on 9/21/2020.  -pt voices understanding of above plan.     2.  Anemia, neutropenia and thrombocytosis secondary to MPN/MDS overlap syndrome and hydroxyurea.  -We will monitor with CBC     3.  Mouth sores:  -Secondary to hydroxyurea.  improving with magic mouthwash.

## 2020-09-23 DIAGNOSIS — D47.1 MPN (MYELOPROLIFERATIVE NEOPLASM) (HCC): Primary | ICD-10-CM

## 2020-09-24 NOTE — TELEPHONE ENCOUNTER
This encounter happened because of technical glitch during epic update.  No charge for this encounter.

## 2020-09-25 ENCOUNTER — OFFICE VISIT (OUTPATIENT)
Dept: ONCOLOGY | Facility: CLINIC | Age: 65
End: 2020-09-25

## 2020-09-25 ENCOUNTER — LAB (OUTPATIENT)
Dept: ONCOLOGY | Facility: HOSPITAL | Age: 65
End: 2020-09-25

## 2020-09-25 VITALS
TEMPERATURE: 98 F | WEIGHT: 204.6 LBS | DIASTOLIC BLOOD PRESSURE: 78 MMHG | SYSTOLIC BLOOD PRESSURE: 151 MMHG | RESPIRATION RATE: 18 BRPM | HEART RATE: 83 BPM | HEIGHT: 72 IN | BODY MASS INDEX: 27.71 KG/M2

## 2020-09-25 DIAGNOSIS — D64.9 ANEMIA, UNSPECIFIED TYPE: ICD-10-CM

## 2020-09-25 DIAGNOSIS — D47.1 MPN (MYELOPROLIFERATIVE NEOPLASM) (HCC): ICD-10-CM

## 2020-09-25 DIAGNOSIS — R53.83 OTHER FATIGUE: ICD-10-CM

## 2020-09-25 DIAGNOSIS — D47.1 MPN (MYELOPROLIFERATIVE NEOPLASM) (HCC): Primary | ICD-10-CM

## 2020-09-25 LAB
ALBUMIN SERPL-MCNC: 4.5 G/DL (ref 3.5–5.2)
ALBUMIN/GLOB SERPL: 2.3 G/DL
ALP SERPL-CCNC: 76 U/L (ref 39–117)
ALT SERPL W P-5'-P-CCNC: 38 U/L (ref 1–41)
ANION GAP SERPL CALCULATED.3IONS-SCNC: 12 MMOL/L (ref 5–15)
ANISOCYTOSIS BLD QL: ABNORMAL
AST SERPL-CCNC: 39 U/L (ref 1–40)
BILIRUB SERPL-MCNC: 0.7 MG/DL (ref 0–1.2)
BUN SERPL-MCNC: 9 MG/DL (ref 8–23)
BUN/CREAT SERPL: 9.6 (ref 7–25)
CALCIUM SPEC-SCNC: 8.9 MG/DL (ref 8.6–10.5)
CHLORIDE SERPL-SCNC: 105 MMOL/L (ref 98–107)
CO2 SERPL-SCNC: 23 MMOL/L (ref 22–29)
CREAT SERPL-MCNC: 0.94 MG/DL (ref 0.76–1.27)
DEPRECATED RDW RBC AUTO: 121.5 FL (ref 37–54)
EOSINOPHIL # BLD MANUAL: 0.04 10*3/MM3 (ref 0–0.4)
EOSINOPHIL NFR BLD MANUAL: 1 % (ref 0.3–6.2)
ERYTHROCYTE [DISTWIDTH] IN BLOOD BY AUTOMATED COUNT: 25.8 % (ref 12.3–15.4)
GFR SERPL CREATININE-BSD FRML MDRD: 81 ML/MIN/1.73
GLOBULIN UR ELPH-MCNC: 2 GM/DL
GLUCOSE SERPL-MCNC: 112 MG/DL (ref 65–99)
HCT VFR BLD AUTO: 23.4 % (ref 37.5–51)
HGB BLD-MCNC: 7.7 G/DL (ref 13–17.7)
HYPOCHROMIA BLD QL: ABNORMAL
LYMPHOCYTES # BLD MANUAL: 1.87 10*3/MM3 (ref 0.7–3.1)
LYMPHOCYTES NFR BLD MANUAL: 47 % (ref 19.6–45.3)
LYMPHOCYTES NFR BLD MANUAL: 6 % (ref 5–12)
MACROCYTES BLD QL SMEAR: ABNORMAL
MCH RBC QN AUTO: 42.5 PG (ref 26.6–33)
MCHC RBC AUTO-ENTMCNC: 32.9 G/DL (ref 31.5–35.7)
MCV RBC AUTO: 129.3 FL (ref 79–97)
METAMYELOCYTES NFR BLD MANUAL: 1 % (ref 0–0)
MONOCYTES # BLD AUTO: 0.24 10*3/MM3 (ref 0.1–0.9)
NEUTROPHILS # BLD AUTO: 1.75 10*3/MM3 (ref 1.7–7)
NEUTROPHILS NFR BLD MANUAL: 42 % (ref 42.7–76)
NEUTS BAND NFR BLD MANUAL: 2 % (ref 0–5)
NRBC SPEC MANUAL: 3 /100 WBC (ref 0–0.2)
PLATELET # BLD AUTO: 432 10*3/MM3 (ref 140–450)
PMV BLD AUTO: 11 FL (ref 6–12)
POLYCHROMASIA BLD QL SMEAR: ABNORMAL
POTASSIUM SERPL-SCNC: 4.4 MMOL/L (ref 3.5–5.2)
PROT SERPL-MCNC: 6.5 G/DL (ref 6–8.5)
RBC # BLD AUTO: 1.81 10*6/MM3 (ref 4.14–5.8)
SMALL PLATELETS BLD QL SMEAR: ADEQUATE
SODIUM SERPL-SCNC: 140 MMOL/L (ref 136–145)
VARIANT LYMPHS NFR BLD MANUAL: 1 % (ref 0–5)
WBC # BLD AUTO: 3.97 10*3/MM3 (ref 3.4–10.8)
WBC MORPH BLD: NORMAL

## 2020-09-25 PROCEDURE — 85025 COMPLETE CBC W/AUTO DIFF WBC: CPT

## 2020-09-25 PROCEDURE — 99214 OFFICE O/P EST MOD 30 MIN: CPT | Performed by: INTERNAL MEDICINE

## 2020-09-25 PROCEDURE — G0463 HOSPITAL OUTPT CLINIC VISIT: HCPCS | Performed by: INTERNAL MEDICINE

## 2020-09-25 PROCEDURE — 80053 COMPREHEN METABOLIC PANEL: CPT

## 2020-09-25 PROCEDURE — 85007 BL SMEAR W/DIFF WBC COUNT: CPT

## 2020-09-25 NOTE — PROGRESS NOTES
DATE OF VISIT: 9/25/2020      REASON FOR VISIT: MPN/MDS overlap syndrome, anemia      HISTORY OF PRESENT ILLNESS:   65-year-old male with medical problems consisting of hypertension, recurrent sinus infection has been following up with [facility] since February 26, 2020 for evaluation of abnormal CBC.  During work-up patient was found to have MPN/MDS overlap syndrome for which patient was started on hydroxyurea.  Hydroxyurea was discontinued on September 17, 2020 due to severe anemia with hemoglobin of 7.3.  Patient is here for follow-up appointment today.  States his fatigue is minimally improved.  Complains of shortness of breath with exertion.  Denies any chest pain or palpitation.  Denies any dizziness.  Denies any ankle ulceration or any bleeding.          PAST MEDICAL HISTORY:    Past Medical History:   Diagnosis Date   • Chest pain 7/11/2020    -Likely 2/2 to anxiety.  -SOLOMON therapy -Troponin negative x2. Refusing another lab draw for 3rd troponin. -Cardiac telemetry -CTA coronary showed calcium score 70. Calcified plaque in mid RCA approaching 70% narrowing. Per Dr. Newman's interpretation.   • Hepatitis B    • Hypertension    • MPN (myeloproliferative neoplasm) (CMS/HCC)    • Sinus infection        SOCIAL HISTORY:    Social History     Tobacco Use   • Smoking status: Former Smoker   • Smokeless tobacco: Never Used   • Tobacco comment: quit in early 21 years old   Substance Use Topics   • Alcohol use: Yes     Alcohol/week: 1.0 standard drinks     Types: 1 Cans of beer per week     Comment: occasionally   • Drug use: Never       Surgical History :  Past Surgical History:   Procedure Laterality Date   • COLONOSCOPY     • HEMORRHOID BANDING         ALLERGIES:    Allergies   Allergen Reactions   • Other Rash     Pt was working with walnut wood and broke out in a rash   • Tetracycline Rash         FAMILY HISTORY:  Family History   Problem Relation Age of Onset   • Diabetes Father    • Cirrhosis Father   "  • Cancer Sister    • Cancer Maternal Uncle            REVIEW OF SYSTEMS:      CONSTITUTIONAL:  Complains of fatigue. Denies any fever, chills or weight loss.     EYES: No visual disturbances. No discharge. No new lesions    ENMT:  No epistaxis, mouth sores or difficulty swallowing.    RESPIRATORY: Positive for shortness of breath with exertion. No new cough or hemoptysis.    CARDIOVASCULAR:  No chest pain or palpitations.    GASTROINTESTINAL:  No abdominal pain nausea, vomiting or blood in the stool.    GENITOURINARY: No Dysuria or Hematuria.    MUSCULOSKELETAL: Positive for chronic arthritis pain.    LYMPHATICS:  Denies any abnormal swollen glands anywhere in the body.    NEUROLOGICAL : No tingling or numbness. No headache or dizziness. No seizures or balance problems.    SKIN: No new skin lesions.    ENDOCRINE : No new hot or cold intolerance. No new polyuria . No polydipsia.        PHYSICAL EXAMINATION:      VITAL SIGNS:  /78   Pulse 83   Temp 98 °F (36.7 °C) (Temporal)   Resp 18   Ht 182.9 cm (72.01\")   Wt 92.8 kg (204 lb 9.6 oz)   BMI 27.74 kg/m²       09/25/20  0840   Weight: 92.8 kg (204 lb 9.6 oz)         ECOG performance status: 1    CONSTITUTIONAL:  Not in any distress.    EYES: Mild conjunctival Pallor. No Icterus. No Pterygium. Extraocular Movements intact.No ptosis.    ENMT:  Normocephalic, Atraumatic.No Facial Asymmetry noted.    NECK:  No adenopathy.Trachea midline. NO JVD.    RESPIRATORY:  Fair air entry bilateral. No rhonchi or wheezing.Fair respiratory effort.    CARDIOVASCULAR:  S1, S2. Regular rate and rhythm. No murmur or gallop appreciated.    ABDOMEN:  Soft, obese, nontender. Bowel sounds present in all four quadrants.  No Hepatosplenomegaly appreciated.    MUSCULOSKELETAL:  No edema.No Calf Tenderness.Normal range of motion.    NEUROLOGIC:    No  Motor or sensory deficit appreciated. Cranial Nerves 2-12 grossly intact.    SKIN : No new skin lesion identified. Skin is warm and " moist to touch.    LYMPHATICS: No new enlarged lymph nodes in neck or supraclavicular area.    PSYCHIATRY: Alert, awake and oriented ×3.  Appears anxious.  Normal judgment.  Makes good eye contact.        DIAGNOSTIC DATA:    Glucose   Date Value Ref Range Status   09/25/2020 112 (H) 65 - 99 mg/dL Final     Sodium   Date Value Ref Range Status   09/25/2020 140 136 - 145 mmol/L Final     Potassium   Date Value Ref Range Status   09/25/2020 4.4 3.5 - 5.2 mmol/L Final     Comment:     Slight hemolysis detected by analyzer. Results may be affected.     CO2   Date Value Ref Range Status   09/25/2020 23.0 22.0 - 29.0 mmol/L Final     Chloride   Date Value Ref Range Status   09/25/2020 105 98 - 107 mmol/L Final     Anion Gap   Date Value Ref Range Status   09/25/2020 12.0 5.0 - 15.0 mmol/L Final     Creatinine   Date Value Ref Range Status   09/25/2020 0.94 0.76 - 1.27 mg/dL Final     BUN   Date Value Ref Range Status   09/25/2020 9 8 - 23 mg/dL Final     BUN/Creatinine Ratio   Date Value Ref Range Status   09/25/2020 9.6 7.0 - 25.0 Final     Calcium   Date Value Ref Range Status   09/25/2020 8.9 8.6 - 10.5 mg/dL Final     eGFR Non  Amer   Date Value Ref Range Status   09/25/2020 81 >60 mL/min/1.73 Final     Alkaline Phosphatase   Date Value Ref Range Status   09/25/2020 76 39 - 117 U/L Final     Total Protein   Date Value Ref Range Status   09/25/2020 6.5 6.0 - 8.5 g/dL Final     ALT (SGPT)   Date Value Ref Range Status   09/25/2020 38 1 - 41 U/L Final     AST (SGOT)   Date Value Ref Range Status   09/25/2020 39 1 - 40 U/L Final     Total Bilirubin   Date Value Ref Range Status   09/25/2020 0.7 0.0 - 1.2 mg/dL Final     Albumin   Date Value Ref Range Status   09/25/2020 4.50 3.50 - 5.20 g/dL Final     Globulin   Date Value Ref Range Status   09/25/2020 2.0 gm/dL Final     Lab Results   Component Value Date    WBC 3.97 09/25/2020    HGB 7.7 (L) 09/25/2020    HCT 23.4 (L) 09/25/2020    .3 (H) 09/25/2020    PLT  432 09/25/2020     Lab Results   Component Value Date    NEUTROABS 1.75 09/25/2020    IRON 109 05/28/2020    TIBC 301 05/28/2020    LABIRON 36 05/28/2020    FERRITIN 687.00 (H) 05/28/2020    [id number] >2,000 (H) 05/28/2020    FOLATE 11.70 05/28/2020     No results found for: , LABCA2, AFPTM, HCGQUANT, , CHROMGRNA, [id number], CEA, REFLABREPO        ASSESSMENT AND PLAN:      1.  MPN/MDS overlap syndrome:  -Bone marrow biopsy done at [facility] on March 23, 2020 is consistent with MPN/MDS overlap syndrome.  - Patient was on hydroxyurea until September 17, 2020 due to severe anemia with hemoglobin of 7.3 and neutropenia with absolute neutrophil count of 1.45 hydroxyurea has been discontinued.  - CBC done today on September 25, 2020 shows white blood cell count is 3.97, hemoglobin is 7.7, platelet count is 432,000.   -Recommend holding hydroxyurea for now.  -Patient had video visit with Dr. Arredondo on September 21, 2020.  We will try to obtain records from that.  -We will recheck CBC and BMP in 2 weeks from now.  -We will ask patient return to clinic in 4 weeks with repeat CBC and CMP on that day.    2.  Macrocytic anemia:  -Secondary to MPN/MDS overlap syndrome plus hydroxyurea.  We will continue holding hydroxyurea for now.    3.  Health maintenance: Patient does not smoke    4. Advance Care Planning: For now patient remains full code and is able to make decisions.  Patient has health care surrogate mentioned on chart.      PHQ-9 Total Score: 0   -Patient is not homicidal or suicidal.  No acute intervention required.    Marv Messina reports a pain score of 0.  Given his pain assessment as noted, treatment options were discussed and the following options were decided upon as a follow-up plan to address the patient's pain: No acute intervention required.         Vadim Song MD  9/25/2020  16:37 CDT        Part of this note may be an electronic transcription/translation of spoken language to  printed text using the Dragon Dictation System.

## 2020-10-02 ENCOUNTER — LAB (OUTPATIENT)
Dept: ONCOLOGY | Facility: HOSPITAL | Age: 65
End: 2020-10-02

## 2020-10-02 DIAGNOSIS — D47.1 MPN (MYELOPROLIFERATIVE NEOPLASM) (HCC): ICD-10-CM

## 2020-10-02 DIAGNOSIS — D64.9 ANEMIA, UNSPECIFIED TYPE: ICD-10-CM

## 2020-10-02 DIAGNOSIS — R53.83 OTHER FATIGUE: ICD-10-CM

## 2020-10-02 LAB
ANION GAP SERPL CALCULATED.3IONS-SCNC: 10 MMOL/L (ref 5–15)
ANISOCYTOSIS BLD QL: ABNORMAL
BUN SERPL-MCNC: 12 MG/DL (ref 8–23)
BUN/CREAT SERPL: 14.3 (ref 7–25)
C3 FRG RBC-MCNC: ABNORMAL
CALCIUM SPEC-SCNC: 9.5 MG/DL (ref 8.6–10.5)
CHLORIDE SERPL-SCNC: 104 MMOL/L (ref 98–107)
CO2 SERPL-SCNC: 26 MMOL/L (ref 22–29)
CREAT SERPL-MCNC: 0.84 MG/DL (ref 0.76–1.27)
DACRYOCYTES BLD QL SMEAR: ABNORMAL
DEPRECATED RDW RBC AUTO: 104 FL (ref 37–54)
ELLIPTOCYTES BLD QL SMEAR: ABNORMAL
ERYTHROCYTE [DISTWIDTH] IN BLOOD BY AUTOMATED COUNT: 23.1 % (ref 12.3–15.4)
GFR SERPL CREATININE-BSD FRML MDRD: 92 ML/MIN/1.73
GIANT PLATELETS: ABNORMAL
GLUCOSE SERPL-MCNC: 105 MG/DL (ref 65–99)
HCT VFR BLD AUTO: 30.8 % (ref 37.5–51)
HGB BLD-MCNC: 10.1 G/DL (ref 13–17.7)
LYMPHOCYTES # BLD MANUAL: 2.54 10*3/MM3 (ref 0.7–3.1)
LYMPHOCYTES NFR BLD MANUAL: 1 % (ref 5–12)
LYMPHOCYTES NFR BLD MANUAL: 45 % (ref 19.6–45.3)
MACROCYTES BLD QL SMEAR: ABNORMAL
MCH RBC QN AUTO: 41.2 PG (ref 26.6–33)
MCHC RBC AUTO-ENTMCNC: 32.8 G/DL (ref 31.5–35.7)
MCV RBC AUTO: 125.7 FL (ref 79–97)
METAMYELOCYTES NFR BLD MANUAL: 1 % (ref 0–0)
MONOCYTES # BLD AUTO: 0.06 10*3/MM3 (ref 0.1–0.9)
NEUTROPHILS # BLD AUTO: 2.99 10*3/MM3 (ref 1.7–7)
NEUTROPHILS NFR BLD MANUAL: 46 % (ref 42.7–76)
NEUTS BAND NFR BLD MANUAL: 7 % (ref 0–5)
NRBC SPEC MANUAL: 3 /100 WBC (ref 0–0.2)
OVALOCYTES BLD QL SMEAR: ABNORMAL
PLATELET # BLD AUTO: 562 10*3/MM3 (ref 140–450)
PMV BLD AUTO: 10.6 FL (ref 6–12)
POTASSIUM SERPL-SCNC: 4.7 MMOL/L (ref 3.5–5.2)
RBC # BLD AUTO: 2.45 10*6/MM3 (ref 4.14–5.8)
SMALL PLATELETS BLD QL SMEAR: ADEQUATE
SODIUM SERPL-SCNC: 140 MMOL/L (ref 136–145)
TARGETS BLD QL SMEAR: ABNORMAL
WBC # BLD AUTO: 5.64 10*3/MM3 (ref 3.4–10.8)
WBC MORPH BLD: NORMAL

## 2020-10-02 PROCEDURE — 85007 BL SMEAR W/DIFF WBC COUNT: CPT

## 2020-10-02 PROCEDURE — 36415 COLL VENOUS BLD VENIPUNCTURE: CPT | Performed by: INTERNAL MEDICINE

## 2020-10-02 PROCEDURE — 85025 COMPLETE CBC W/AUTO DIFF WBC: CPT

## 2020-10-02 PROCEDURE — 80048 BASIC METABOLIC PNL TOTAL CA: CPT

## 2020-10-05 ENCOUNTER — TELEPHONE (OUTPATIENT)
Dept: ONCOLOGY | Facility: HOSPITAL | Age: 65
End: 2020-10-05

## 2020-10-05 NOTE — TELEPHONE ENCOUNTER
Called and spoke with pt regarding lab results and continuing hydroxyurea as per dr. Song instructions.  V/u obtained.

## 2020-10-05 NOTE — TELEPHONE ENCOUNTER
----- Message from Vadim Song MD sent at 10/5/2020  7:40 AM CDT -----  Please let patient know, his hemoglobin is improved to 10.1.  His platelet count is elevated at 562,000.  He can restart hydroxyurea at 500 mg p.o. daily.  We will recheck CBC and CMP with next clinic visit.  Thank you

## 2020-10-23 ENCOUNTER — LAB (OUTPATIENT)
Dept: ONCOLOGY | Facility: HOSPITAL | Age: 65
End: 2020-10-23

## 2020-10-23 ENCOUNTER — APPOINTMENT (OUTPATIENT)
Dept: ONCOLOGY | Facility: CLINIC | Age: 65
End: 2020-10-23

## 2020-10-23 ENCOUNTER — TELEMEDICINE (OUTPATIENT)
Dept: ONCOLOGY | Facility: CLINIC | Age: 65
End: 2020-10-23

## 2020-10-23 DIAGNOSIS — D47.1 MPN (MYELOPROLIFERATIVE NEOPLASM) (HCC): ICD-10-CM

## 2020-10-23 DIAGNOSIS — D64.9 ANEMIA, UNSPECIFIED TYPE: ICD-10-CM

## 2020-10-23 DIAGNOSIS — R53.83 OTHER FATIGUE: ICD-10-CM

## 2020-10-23 DIAGNOSIS — D47.1 MPN (MYELOPROLIFERATIVE NEOPLASM) (HCC): Primary | ICD-10-CM

## 2020-10-23 DIAGNOSIS — D75.839 THROMBOCYTOSIS: ICD-10-CM

## 2020-10-23 LAB
ALBUMIN SERPL-MCNC: 4.6 G/DL (ref 3.5–5.2)
ALBUMIN/GLOB SERPL: 2.2 G/DL
ALP SERPL-CCNC: 56 U/L (ref 39–117)
ALT SERPL W P-5'-P-CCNC: 29 U/L (ref 1–41)
ANION GAP SERPL CALCULATED.3IONS-SCNC: 10 MMOL/L (ref 5–15)
ANISOCYTOSIS BLD QL: NORMAL
AST SERPL-CCNC: 27 U/L (ref 1–40)
BASOPHILS # BLD AUTO: 0.19 10*3/MM3 (ref 0–0.2)
BASOPHILS NFR BLD AUTO: 2.1 % (ref 0–1.5)
BILIRUB SERPL-MCNC: 0.5 MG/DL (ref 0–1.2)
BUN SERPL-MCNC: 14 MG/DL (ref 8–23)
BUN/CREAT SERPL: 16.5 (ref 7–25)
CALCIUM SPEC-SCNC: 9.3 MG/DL (ref 8.6–10.5)
CHLORIDE SERPL-SCNC: 104 MMOL/L (ref 98–107)
CO2 SERPL-SCNC: 23 MMOL/L (ref 22–29)
CREAT SERPL-MCNC: 0.85 MG/DL (ref 0.76–1.27)
DEPRECATED RDW RBC AUTO: 88.5 FL (ref 37–54)
EOSINOPHIL # BLD AUTO: 0.8 10*3/MM3 (ref 0–0.4)
EOSINOPHIL NFR BLD AUTO: 8.9 % (ref 0.3–6.2)
ERYTHROCYTE [DISTWIDTH] IN BLOOD BY AUTOMATED COUNT: 22.3 % (ref 12.3–15.4)
GFR SERPL CREATININE-BSD FRML MDRD: 91 ML/MIN/1.73
GIANT PLATELETS: NORMAL
GLOBULIN UR ELPH-MCNC: 2.1 GM/DL
GLUCOSE SERPL-MCNC: 114 MG/DL (ref 65–99)
HCT VFR BLD AUTO: 34.1 % (ref 37.5–51)
HGB BLD-MCNC: 11.4 G/DL (ref 13–17.7)
IMM GRANULOCYTES # BLD AUTO: 0.04 10*3/MM3 (ref 0–0.05)
IMM GRANULOCYTES NFR BLD AUTO: 0.4 % (ref 0–0.5)
LARGE PLATELETS: NORMAL
LYMPHOCYTES # BLD AUTO: 3.39 10*3/MM3 (ref 0.7–3.1)
LYMPHOCYTES NFR BLD AUTO: 37.7 % (ref 19.6–45.3)
MCH RBC QN AUTO: 37.5 PG (ref 26.6–33)
MCHC RBC AUTO-ENTMCNC: 33.4 G/DL (ref 31.5–35.7)
MCV RBC AUTO: 112.2 FL (ref 79–97)
MONOCYTES # BLD AUTO: 0.55 10*3/MM3 (ref 0.1–0.9)
MONOCYTES NFR BLD AUTO: 6.1 % (ref 5–12)
NEUTROPHILS NFR BLD AUTO: 4.03 10*3/MM3 (ref 1.7–7)
NEUTROPHILS NFR BLD AUTO: 44.8 % (ref 42.7–76)
NRBC BLD AUTO-RTO: 0.7 /100 WBC (ref 0–0.2)
PLATELET # BLD AUTO: 791 10*3/MM3 (ref 140–450)
PMV BLD AUTO: 10.2 FL (ref 6–12)
POTASSIUM SERPL-SCNC: 4.3 MMOL/L (ref 3.5–5.2)
PROT SERPL-MCNC: 6.7 G/DL (ref 6–8.5)
RBC # BLD AUTO: 3.04 10*6/MM3 (ref 4.14–5.8)
SMALL PLATELETS BLD QL SMEAR: NORMAL
SODIUM SERPL-SCNC: 137 MMOL/L (ref 136–145)
WBC # BLD AUTO: 9 10*3/MM3 (ref 3.4–10.8)
WBC MORPH BLD: NORMAL

## 2020-10-23 PROCEDURE — 99214 OFFICE O/P EST MOD 30 MIN: CPT | Performed by: INTERNAL MEDICINE

## 2020-10-23 PROCEDURE — 85025 COMPLETE CBC W/AUTO DIFF WBC: CPT

## 2020-10-23 PROCEDURE — 85007 BL SMEAR W/DIFF WBC COUNT: CPT

## 2020-10-23 PROCEDURE — 80053 COMPREHEN METABOLIC PANEL: CPT

## 2020-10-23 PROCEDURE — 36415 COLL VENOUS BLD VENIPUNCTURE: CPT | Performed by: INTERNAL MEDICINE

## 2020-10-23 NOTE — PROGRESS NOTES
DATE OF VISIT: 10/23/2020    You have chosen to receive care through a telehealth visit.  Do you consent to use a video/audio connection for your medical care today? Yes       REASON FOR VISIT: MPN/MDS overlap syndrome, anemia, thrombocytosis      HISTORY OF PRESENT ILLNESS:    65-year-old male with medical problem consisting of hypertension, recurrent sinus infection has been following up with [facility] since February 26, 2020 for evaluation of MDS/MPN overlap syndrome.  Patient is currently on hydroxyurea 500 mg p.o. daily since October 2, 2020.  States her fatigue is improved.  Denies any shortness of breath or any chest pain or palpitation.  Denies any dizziness.  Denies any ankle ulceration or any active bleeding.        Oncology history:    1.  MPN/MDS overlap syndrome:  -Bone marrow biopsy done at [facility] on March 23, 2020 is consistent with MPN/MDS overlap syndrome.  - Patient was on hydroxyurea until September 17, 2020 due to severe anemia with hemoglobin of 7.3 and neutropenia with absolute neutrophil count of 1.45 hydroxyurea has been discontinued.  - CBC done on September 25, 2020 shows white blood cell count is 3.97, hemoglobin is 7.7, platelet count is 432,000.     -Hydroxyurea was discontinued on September 17 2020.  -Hydroxyurea was started back on October 2, 2020 at dose of 500 mg p.o. daily.        PAST MEDICAL HISTORY:    Past Medical History:   Diagnosis Date   • Chest pain 7/11/2020    -Likely 2/2 to anxiety.  -SOLOMON therapy -Troponin negative x2. Refusing another lab draw for 3rd troponin. -Cardiac telemetry -CTA coronary showed calcium score 70. Calcified plaque in mid RCA approaching 70% narrowing. Per Dr. Newman's interpretation.   • Hepatitis B    • Hypertension    • MPN (myeloproliferative neoplasm) (CMS/HCC)    • Sinus infection        SOCIAL HISTORY:    Social History     Tobacco Use   • Smoking status: Former Smoker   • Smokeless tobacco: Never Used   • Tobacco comment: quit  in early 21 years old   Substance Use Topics   • Alcohol use: Yes     Alcohol/week: 1.0 standard drinks     Types: 1 Cans of beer per week     Comment: occasionally   • Drug use: Never       Surgical History :  Past Surgical History:   Procedure Laterality Date   • COLONOSCOPY     • HEMORRHOID BANDING         ALLERGIES:    Allergies   Allergen Reactions   • Other Rash     Pt was working with walnut wood and broke out in a rash   • Tetracycline Rash         FAMILY HISTORY:  Family History   Problem Relation Age of Onset   • Diabetes Father    • Cirrhosis Father    • Cancer Sister    • Cancer Maternal Uncle            REVIEW OF SYSTEMS:      CONSTITUTIONAL:  Complains of fatigue. Denies any fever, chills or weight loss.     EYES: No visual disturbances. No discharge. No new lesions    ENMT:  No epistaxis, mouth sores or difficulty swallowing.    RESPIRATORY:  No new shortness of breath. No new cough or hemoptysis.    CARDIOVASCULAR:  No chest pain or palpitations.    GASTROINTESTINAL:  No abdominal pain nausea, vomiting or blood in the stool.    GENITOURINARY: No Dysuria or Hematuria.    MUSCULOSKELETAL: Positive for chronic arthritis pain    LYMPHATICS:  Denies any abnormal swollen glands anywhere in the body.    NEUROLOGICAL : No tingling or numbness. No headache or dizziness. No seizures or balance problems.    SKIN: No new skin lesions.    ENDOCRINE : No new hot or cold intolerance. No new polyuria . No polydipsia.        PHYSICAL EXAMINATION:      VITAL SIGNS:  Afebrile(per patient), RR-16      ECOG performance status: 1    CONSTITUTIONAL:  Not in any distress.    EYES:  Extraocular Movements intact.No ptosis.    ENMT:  Normocephalic, Atraumatic.No Facial Asymmetry noted.    NECK:  No  visible adenopathy.    RESPIRATORY:  Fair respiratory effort.    MUSCULOSKELETAL:  Decreased range of motion.    NEUROLOGIC:    No  Motor  deficit appreciated.  Moving all 4 extremities appropriately.    SKIN : No new skin lesion  lesions.    LYMPHATICS: No visible enlarged lymph nodes in neck or supraclavicular area.    PSYCHIATRY: Alert, awake and oriented ×3.  Normal affect.  Normal judgment.  Makes good eye contact.        DIAGNOSTIC DATA:    Glucose   Date Value Ref Range Status   10/23/2020 114 (H) 65 - 99 mg/dL Final     Sodium   Date Value Ref Range Status   10/23/2020 137 136 - 145 mmol/L Final     Potassium   Date Value Ref Range Status   10/23/2020 4.3 3.5 - 5.2 mmol/L Final     CO2   Date Value Ref Range Status   10/23/2020 23.0 22.0 - 29.0 mmol/L Final     Chloride   Date Value Ref Range Status   10/23/2020 104 98 - 107 mmol/L Final     Anion Gap   Date Value Ref Range Status   10/23/2020 10.0 5.0 - 15.0 mmol/L Final     Creatinine   Date Value Ref Range Status   10/23/2020 0.85 0.76 - 1.27 mg/dL Final     BUN   Date Value Ref Range Status   10/23/2020 14 8 - 23 mg/dL Final     BUN/Creatinine Ratio   Date Value Ref Range Status   10/23/2020 16.5 7.0 - 25.0 Final     Calcium   Date Value Ref Range Status   10/23/2020 9.3 8.6 - 10.5 mg/dL Final     eGFR Non  Amer   Date Value Ref Range Status   10/23/2020 91 >60 mL/min/1.73 Final     Alkaline Phosphatase   Date Value Ref Range Status   10/23/2020 56 39 - 117 U/L Final     Total Protein   Date Value Ref Range Status   10/23/2020 6.7 6.0 - 8.5 g/dL Final     ALT (SGPT)   Date Value Ref Range Status   10/23/2020 29 1 - 41 U/L Final     AST (SGOT)   Date Value Ref Range Status   10/23/2020 27 1 - 40 U/L Final     Total Bilirubin   Date Value Ref Range Status   10/23/2020 0.5 0.0 - 1.2 mg/dL Final     Albumin   Date Value Ref Range Status   10/23/2020 4.60 3.50 - 5.20 g/dL Final     Globulin   Date Value Ref Range Status   10/23/2020 2.1 gm/dL Final     Lab Results   Component Value Date    WBC 9.00 10/23/2020    HGB 11.4 (L) 10/23/2020    HCT 34.1 (L) 10/23/2020    .2 (H) 10/23/2020     (H) 10/23/2020     Lab Results   Component Value Date    NEUTROABS 4.03  10/23/2020    IRON 109 05/28/2020    TIBC 301 05/28/2020    LABIRON 36 05/28/2020    FERRITIN 687.00 (H) 05/28/2020    [id number] >2,000 (H) 05/28/2020    FOLATE 11.70 05/28/2020     No results found for: , LABCA2, AFPTM, HCGQUANT, , CHROMGRNA, [id number], CEA, REFLABREPO          ASSESSMENT AND PLAN:      1.  MPN/MDS overlap syndrome:  -Please review oncology history for prior treatment details  -Patient is currently on hydroxyurea 500 mg p.o. daily since October 2, 2020.  -Patient is currently on CBC done today on October 23, 2020 shows white blood cell count is 9000, hemoglobin is 11.4, platelet count is 791,000.  -We will increase dose of hydroxyurea to 500 mg twice daily from today on October 23, 2020.  -We will ask patient to return to clinic in 2 weeks for repeat CBC  -We will see patient back here in clinic in 4 weeks with repeat CBC and CMP on that day.    2.  Anemia and thrombocytosis:  -Secondary to MDS/MPN overlap syndrome.  We will continue with clinical monitoring    3.  Health maintenance: Patient does not smoke    4.  Advance Care Planning: For now patient remains full code and is able to make decisions.  Patient has health care surrogate mentioned on chart.         PHQ-9 Total Score:       Marv Messina reports a pain score of 0.  Given his pain assessment as noted, treatment options were discussed and the following options were decided upon as a follow-up plan to address the patient's pain: No acute intervention required.               This visit was completed as a video visit due to ongoing pandemic with coronavirus.    I did not complete this video visit with the patient using [employer].       Vadim oSng MD  10/23/2020  10:11 CDT          Part of this note may be an electronic transcription/translation of spoken language to printed text using the Dragon Dictation System.

## 2020-11-06 ENCOUNTER — TELEPHONE (OUTPATIENT)
Dept: ONCOLOGY | Facility: HOSPITAL | Age: 65
End: 2020-11-06

## 2020-11-06 ENCOUNTER — LAB (OUTPATIENT)
Dept: ONCOLOGY | Facility: HOSPITAL | Age: 65
End: 2020-11-06

## 2020-11-06 ENCOUNTER — TELEPHONE (OUTPATIENT)
Dept: ONCOLOGY | Facility: CLINIC | Age: 65
End: 2020-11-06

## 2020-11-06 DIAGNOSIS — D47.1 MPN (MYELOPROLIFERATIVE NEOPLASM) (HCC): ICD-10-CM

## 2020-11-06 DIAGNOSIS — D75.839 THROMBOCYTOSIS: ICD-10-CM

## 2020-11-06 DIAGNOSIS — D64.9 ANEMIA, UNSPECIFIED TYPE: ICD-10-CM

## 2020-11-06 LAB
ALBUMIN SERPL-MCNC: 4.7 G/DL (ref 3.5–5.2)
ALBUMIN/GLOB SERPL: 2 G/DL
ALP SERPL-CCNC: 61 U/L (ref 39–117)
ALT SERPL W P-5'-P-CCNC: 39 U/L (ref 1–41)
ANION GAP SERPL CALCULATED.3IONS-SCNC: 9 MMOL/L (ref 5–15)
AST SERPL-CCNC: 28 U/L (ref 1–40)
BASOPHILS # BLD AUTO: 0.13 10*3/MM3 (ref 0–0.2)
BASOPHILS NFR BLD AUTO: 1.7 % (ref 0–1.5)
BILIRUB SERPL-MCNC: 0.7 MG/DL (ref 0–1.2)
BUN SERPL-MCNC: 15 MG/DL (ref 8–23)
BUN/CREAT SERPL: 16.3 (ref 7–25)
CALCIUM SPEC-SCNC: 9.6 MG/DL (ref 8.6–10.5)
CHLORIDE SERPL-SCNC: 103 MMOL/L (ref 98–107)
CO2 SERPL-SCNC: 24 MMOL/L (ref 22–29)
CREAT SERPL-MCNC: 0.92 MG/DL (ref 0.76–1.27)
DEPRECATED RDW RBC AUTO: 88.4 FL (ref 37–54)
EOSINOPHIL # BLD AUTO: 0.38 10*3/MM3 (ref 0–0.4)
EOSINOPHIL NFR BLD AUTO: 4.8 % (ref 0.3–6.2)
ERYTHROCYTE [DISTWIDTH] IN BLOOD BY AUTOMATED COUNT: 22.4 % (ref 12.3–15.4)
GFR SERPL CREATININE-BSD FRML MDRD: 83 ML/MIN/1.73
GLOBULIN UR ELPH-MCNC: 2.4 GM/DL
GLUCOSE SERPL-MCNC: 129 MG/DL (ref 65–99)
HCT VFR BLD AUTO: 33.3 % (ref 37.5–51)
HGB BLD-MCNC: 11.2 G/DL (ref 13–17.7)
IMM GRANULOCYTES # BLD AUTO: 0.06 10*3/MM3 (ref 0–0.05)
IMM GRANULOCYTES NFR BLD AUTO: 0.8 % (ref 0–0.5)
LYMPHOCYTES # BLD AUTO: 2.69 10*3/MM3 (ref 0.7–3.1)
LYMPHOCYTES NFR BLD AUTO: 34.3 % (ref 19.6–45.3)
MCH RBC QN AUTO: 37.1 PG (ref 26.6–33)
MCHC RBC AUTO-ENTMCNC: 33.6 G/DL (ref 31.5–35.7)
MCV RBC AUTO: 110.3 FL (ref 79–97)
MONOCYTES # BLD AUTO: 0.48 10*3/MM3 (ref 0.1–0.9)
MONOCYTES NFR BLD AUTO: 6.1 % (ref 5–12)
NEUTROPHILS NFR BLD AUTO: 4.11 10*3/MM3 (ref 1.7–7)
NEUTROPHILS NFR BLD AUTO: 52.3 % (ref 42.7–76)
NRBC BLD AUTO-RTO: 1.3 /100 WBC (ref 0–0.2)
PLATELET # BLD AUTO: 787 10*3/MM3 (ref 140–450)
PMV BLD AUTO: 10.3 FL (ref 6–12)
POTASSIUM SERPL-SCNC: 4.2 MMOL/L (ref 3.5–5.2)
PROT SERPL-MCNC: 7.1 G/DL (ref 6–8.5)
RBC # BLD AUTO: 3.02 10*6/MM3 (ref 4.14–5.8)
SODIUM SERPL-SCNC: 136 MMOL/L (ref 136–145)
WBC # BLD AUTO: 7.85 10*3/MM3 (ref 3.4–10.8)

## 2020-11-06 PROCEDURE — 80053 COMPREHEN METABOLIC PANEL: CPT

## 2020-11-06 PROCEDURE — 36415 COLL VENOUS BLD VENIPUNCTURE: CPT | Performed by: INTERNAL MEDICINE

## 2020-11-06 PROCEDURE — 85025 COMPLETE CBC W/AUTO DIFF WBC: CPT

## 2020-11-06 NOTE — TELEPHONE ENCOUNTER
----- Message from Vadim Song MD sent at 11/6/2020  1:00 PM CST -----  Please let patient know, his hemoglobin is 11.2, platelet count is 787,000.  Recommend increasing hydroxyurea to 3 tablets p.o. on Monday to Thursday and stay on hydroxyurea 500 mg 2 tablets Friday ,Saturday and Sunday.  We will recheck CBC in 2 weeks with next clinic visit.  Thank you

## 2020-11-06 NOTE — TELEPHONE ENCOUNTER
Called and spoke with pt regarding lab results and instructions on taking medications per dr. Song, v/u obtained.

## 2020-11-11 DIAGNOSIS — D47.1 MPN (MYELOPROLIFERATIVE NEOPLASM) (HCC): Primary | ICD-10-CM

## 2020-11-19 ENCOUNTER — TELEPHONE (OUTPATIENT)
Dept: ONCOLOGY | Facility: CLINIC | Age: 65
End: 2020-11-19

## 2020-11-19 ENCOUNTER — LAB (OUTPATIENT)
Dept: ONCOLOGY | Facility: HOSPITAL | Age: 65
End: 2020-11-19

## 2020-11-19 DIAGNOSIS — D75.839 THROMBOCYTOSIS: ICD-10-CM

## 2020-11-19 DIAGNOSIS — D72.829 LEUKOCYTOSIS, UNSPECIFIED TYPE: ICD-10-CM

## 2020-11-19 DIAGNOSIS — D47.1 MPN (MYELOPROLIFERATIVE NEOPLASM) (HCC): ICD-10-CM

## 2020-11-19 DIAGNOSIS — Z12.5 SCREENING PSA (PROSTATE SPECIFIC ANTIGEN): Primary | ICD-10-CM

## 2020-11-19 LAB
ALBUMIN SERPL-MCNC: 5 G/DL (ref 3.5–5.2)
ALBUMIN/GLOB SERPL: 2 G/DL
ALP SERPL-CCNC: 54 U/L (ref 39–117)
ALT SERPL W P-5'-P-CCNC: 24 U/L (ref 1–41)
ANION GAP SERPL CALCULATED.3IONS-SCNC: 12 MMOL/L (ref 5–15)
AST SERPL-CCNC: 23 U/L (ref 1–40)
BASOPHILS # BLD AUTO: 0.14 10*3/MM3 (ref 0–0.2)
BASOPHILS NFR BLD AUTO: 1.7 % (ref 0–1.5)
BILIRUB SERPL-MCNC: 0.8 MG/DL (ref 0–1.2)
BUN SERPL-MCNC: 14 MG/DL (ref 8–23)
BUN/CREAT SERPL: 15.7 (ref 7–25)
CALCIUM SPEC-SCNC: 10 MG/DL (ref 8.6–10.5)
CHLORIDE SERPL-SCNC: 104 MMOL/L (ref 98–107)
CO2 SERPL-SCNC: 24 MMOL/L (ref 22–29)
CREAT SERPL-MCNC: 0.89 MG/DL (ref 0.76–1.27)
DEPRECATED RDW RBC AUTO: 85.7 FL (ref 37–54)
EOSINOPHIL # BLD AUTO: 0.28 10*3/MM3 (ref 0–0.4)
EOSINOPHIL NFR BLD AUTO: 3.4 % (ref 0.3–6.2)
ERYTHROCYTE [DISTWIDTH] IN BLOOD BY AUTOMATED COUNT: 22.5 % (ref 12.3–15.4)
GFR SERPL CREATININE-BSD FRML MDRD: 86 ML/MIN/1.73
GLOBULIN UR ELPH-MCNC: 2.5 GM/DL
GLUCOSE SERPL-MCNC: 101 MG/DL (ref 65–99)
HCT VFR BLD AUTO: 35.6 % (ref 37.5–51)
HGB BLD-MCNC: 12.1 G/DL (ref 13–17.7)
IMM GRANULOCYTES # BLD AUTO: 0.03 10*3/MM3 (ref 0–0.05)
IMM GRANULOCYTES NFR BLD AUTO: 0.4 % (ref 0–0.5)
LYMPHOCYTES # BLD AUTO: 3.22 10*3/MM3 (ref 0.7–3.1)
LYMPHOCYTES NFR BLD AUTO: 39.3 % (ref 19.6–45.3)
MCH RBC QN AUTO: 36.7 PG (ref 26.6–33)
MCHC RBC AUTO-ENTMCNC: 34 G/DL (ref 31.5–35.7)
MCV RBC AUTO: 107.9 FL (ref 79–97)
MONOCYTES # BLD AUTO: 0.43 10*3/MM3 (ref 0.1–0.9)
MONOCYTES NFR BLD AUTO: 5.2 % (ref 5–12)
NEUTROPHILS NFR BLD AUTO: 4.1 10*3/MM3 (ref 1.7–7)
NEUTROPHILS NFR BLD AUTO: 50 % (ref 42.7–76)
NRBC BLD AUTO-RTO: 0.9 /100 WBC (ref 0–0.2)
PLATELET # BLD AUTO: 782 10*3/MM3 (ref 140–450)
PMV BLD AUTO: 10.6 FL (ref 6–12)
POTASSIUM SERPL-SCNC: 4.2 MMOL/L (ref 3.5–5.2)
PROT SERPL-MCNC: 7.5 G/DL (ref 6–8.5)
PSA SERPL-MCNC: 0.91 NG/ML (ref 0–4)
RBC # BLD AUTO: 3.3 10*6/MM3 (ref 4.14–5.8)
RBC MORPH BLD: NORMAL
SMALL PLATELETS BLD QL SMEAR: NORMAL
SODIUM SERPL-SCNC: 140 MMOL/L (ref 136–145)
WBC # BLD AUTO: 8.2 10*3/MM3 (ref 3.4–10.8)
WBC MORPH BLD: NORMAL

## 2020-11-19 PROCEDURE — 85025 COMPLETE CBC W/AUTO DIFF WBC: CPT

## 2020-11-19 PROCEDURE — 36415 COLL VENOUS BLD VENIPUNCTURE: CPT | Performed by: INTERNAL MEDICINE

## 2020-11-19 PROCEDURE — 85007 BL SMEAR W/DIFF WBC COUNT: CPT

## 2020-11-19 PROCEDURE — 80053 COMPREHEN METABOLIC PANEL: CPT

## 2020-11-19 PROCEDURE — 84153 ASSAY OF PSA TOTAL: CPT

## 2020-11-20 ENCOUNTER — TELEMEDICINE (OUTPATIENT)
Dept: ONCOLOGY | Facility: CLINIC | Age: 65
End: 2020-11-20

## 2020-11-20 DIAGNOSIS — D64.9 ANEMIA, UNSPECIFIED TYPE: ICD-10-CM

## 2020-11-20 DIAGNOSIS — D47.1 MPN (MYELOPROLIFERATIVE NEOPLASM) (HCC): Primary | ICD-10-CM

## 2020-11-20 DIAGNOSIS — D75.839 THROMBOCYTOSIS: ICD-10-CM

## 2020-11-20 PROCEDURE — 99214 OFFICE O/P EST MOD 30 MIN: CPT | Performed by: INTERNAL MEDICINE

## 2020-11-20 NOTE — PROGRESS NOTES
DATE OF VISIT: 11/20/2020    You have chosen to receive care through a telehealth visit.  Do you consent to use a video/audio connection for your medical care today? Yes       REASON FOR VISIT: MPN/MDS overlap syndrome, anemia, thrombocytosis      HISTORY OF PRESENT ILLNESS:    65-year-old male with medical problem consisting of hypertension, recurrent sinus infection, MPN/MDS syndrome for which patient has been following up with [facility] since February 26, 2020.  Patient is currently on hydroxyurea 500 mg 3 tablets Monday to Thursday, taking 500 mg twice daily on Saturday and Sunday and Friday.  Patient had a blood work done recently, is here to discuss the result.  States he feels good.  Denies any worsening fatigue.  Denies any new shortness of breath or chest pain or palpitation.  Denies any ankle ulceration or any active bleeding.        Oncology history:    1.  MPN/MDS overlap syndrome:  -Bone marrow biopsy done at [place] [facility] on March 23, 2020 is consistent with MPN/MDS overlap syndrome.  - Patient was on hydroxyurea until September 17, 2020 due to severe anemia with hemoglobin of 7.3 and neutropenia with absolute neutrophil count of 1.45 hydroxyurea has been discontinued.  - CBC done on September 25, 2020 shows white blood cell count is 3.97, hemoglobin is 7.7, platelet count is 432,000.     -Hydroxyurea was discontinued on September 17 2020.  -Hydroxyurea was started back on October 2, 2020 at dose of 500 mg p.o. daily.  -Dose of hydroxyurea was increased to 500 mg 3 tablets Monday to Thursday and 500 mg twice daily on Friday Saturday Sunday on November 4, 2020.      PAST MEDICAL HISTORY:    Past Medical History:   Diagnosis Date   • Chest pain 7/11/2020    -Likely 2/2 to anxiety.  -SOLOMON therapy -Troponin negative x2. Refusing another lab draw for 3rd troponin. -Cardiac telemetry -CTA coronary showed calcium score 70. Calcified plaque in mid RCA approaching 70% narrowing. Per Dr. Newman's  interpretation.   • Hepatitis B    • Hypertension    • MPN (myeloproliferative neoplasm) (CMS/HCC)    • Sinus infection        SOCIAL HISTORY:    Social History     Tobacco Use   • Smoking status: Former Smoker   • Smokeless tobacco: Never Used   • Tobacco comment: quit in early 21 years old   Substance Use Topics   • Alcohol use: Yes     Alcohol/week: 1.0 standard drinks     Types: 1 Cans of beer per week     Comment: occasionally   • Drug use: Never       Surgical History :  Past Surgical History:   Procedure Laterality Date   • COLONOSCOPY     • HEMORRHOID BANDING         ALLERGIES:    Allergies   Allergen Reactions   • Other Rash     Pt was working with walnut wood and broke out in a rash   • Tetracycline Rash         FAMILY HISTORY:  Family History   Problem Relation Age of Onset   • Diabetes Father    • Cirrhosis Father    • Cancer Sister    • Cancer Maternal Uncle            REVIEW OF SYSTEMS:      CONSTITUTIONAL:  Complains of fatigue. Denies any fever, chills or weight loss.     EYES: No visual disturbances. No discharge. No new lesions    ENMT:  No epistaxis, mouth sores or difficulty swallowing.    RESPIRATORY:  No new shortness of breath. No new cough or hemoptysis.    CARDIOVASCULAR:  No chest pain or palpitations.    GASTROINTESTINAL:  No abdominal pain nausea, vomiting or blood in the stool.    GENITOURINARY: No Dysuria or Hematuria.    MUSCULOSKELETAL: Positive for chronic arthritis pain    LYMPHATICS:  Denies any abnormal swollen glands anywhere in the body.    NEUROLOGICAL : No tingling or numbness. No headache or dizziness. No seizures or balance problems.    SKIN: No new skin lesions.    ENDOCRINE : No new hot or cold intolerance. No new polyuria . No polydipsia.        PHYSICAL EXAMINATION:      VITAL SIGNS:  Afebrile(per patient), RR-16      ECOG performance status: 2    CONSTITUTIONAL:  Not in any distress.    EYES:  Extraocular Movements intact.No ptosis.    ENMT:  Normocephalic,  Atraumatic.No Facial Asymmetry noted.    NECK:  No  visible adenopathy.    RESPIRATORY:  Fair respiratory effort.    MUSCULOSKELETAL:  Normal range of motion.    NEUROLOGIC:    No  Motor  deficit appreciated.  Moving all 4 extremities appropriately.    SKIN : No new skin lesion lesions.    LYMPHATICS: No visible enlarged lymph nodes in neck or supraclavicular area.    PSYCHIATRY: Alert, awake and oriented ×3.  Normal affect.  Normal judgment.            DIAGNOSTIC DATA:    Glucose   Date Value Ref Range Status   11/19/2020 101 (H) 65 - 99 mg/dL Final     Sodium   Date Value Ref Range Status   11/19/2020 140 136 - 145 mmol/L Final     Potassium   Date Value Ref Range Status   11/19/2020 4.2 3.5 - 5.2 mmol/L Final     CO2   Date Value Ref Range Status   11/19/2020 24.0 22.0 - 29.0 mmol/L Final     Chloride   Date Value Ref Range Status   11/19/2020 104 98 - 107 mmol/L Final     Anion Gap   Date Value Ref Range Status   11/19/2020 12.0 5.0 - 15.0 mmol/L Final     Creatinine   Date Value Ref Range Status   11/19/2020 0.89 0.76 - 1.27 mg/dL Final     BUN   Date Value Ref Range Status   11/19/2020 14 8 - 23 mg/dL Final     BUN/Creatinine Ratio   Date Value Ref Range Status   11/19/2020 15.7 7.0 - 25.0 Final     Calcium   Date Value Ref Range Status   11/19/2020 10.0 8.6 - 10.5 mg/dL Final     eGFR Non  Amer   Date Value Ref Range Status   11/19/2020 86 >60 mL/min/1.73 Final     Alkaline Phosphatase   Date Value Ref Range Status   11/19/2020 54 39 - 117 U/L Final     Total Protein   Date Value Ref Range Status   11/19/2020 7.5 6.0 - 8.5 g/dL Final     ALT (SGPT)   Date Value Ref Range Status   11/19/2020 24 1 - 41 U/L Final     AST (SGOT)   Date Value Ref Range Status   11/19/2020 23 1 - 40 U/L Final     Total Bilirubin   Date Value Ref Range Status   11/19/2020 0.8 0.0 - 1.2 mg/dL Final     Albumin   Date Value Ref Range Status   11/19/2020 5.00 3.50 - 5.20 g/dL Final     Globulin   Date Value Ref Range Status    11/19/2020 2.5 gm/dL Final     Lab Results   Component Value Date    WBC 8.20 11/19/2020    HGB 12.1 (L) 11/19/2020    HCT 35.6 (L) 11/19/2020    .9 (H) 11/19/2020     (H) 11/19/2020     Lab Results   Component Value Date    NEUTROABS 4.10 11/19/2020    IRON 109 05/28/2020    TIBC 301 05/28/2020    LABIRON 36 05/28/2020    FERRITIN 687.00 (H) 05/28/2020    [id number] >2,000 (H) 05/28/2020    FOLATE 11.70 05/28/2020     No results found for: , LABCA2, AFPTM, HCGQUANT, , CHROMGRNA, [id number], CEA, REFLABREPO        PATHOLOGY:  * Cannot find OR log *         RADIOLOGY DATA :  No radiology results for the last 7 days      ASSESSMENT AND PLAN:      1.  MPN/MDS overlap syndrome:  -Please review oncology history for prior treatment details  -Currently patient on 500 mg 3 tablets Monday to Thursday and 500 mg twice daily on Friday Saturday Sunday since November 6, 2020.  -CBC done on November 19, 2020 shows white blood cell count is 8.20, hemoglobin is 12.1, platelet count is 782,000.  -Recommend increasing hydroxyurea to 1500 grams p.o. daily.  We will recheck CBC in 2 weeks  -We will ask patient return to clinic in 4 weeks with repeat CBC and CMP to be done on that day.    2.  Anemia and thrombocytosis:  -Secondary to MDS/MPN overlap syndrome.  We will continue with clinical monitoring    3.  Health maintenance: Patient does not smoke    4. Advance Care Planning: For now patient remains full code and is able to make decisions.  Patient has health care surrogate mentioned on chart.         PHQ-9 Total Score:       Marv Messina reports a pain score of 0.  Given his pain assessment as noted, treatment options were discussed and the following options were decided upon as a follow-up plan to address the patient's pain: No acute intervention required.               This visit was completed as a video visit due to ongoing pandemic with coronavirus.    I did  complete this video visit with the  patient using MyChart.       Vadim Song MD  11/20/2020  08:58 CST          Part of this note may be an electronic transcription/translation of spoken language to printed text using the Dragon Dictation System.

## 2020-12-04 ENCOUNTER — LAB (OUTPATIENT)
Dept: ONCOLOGY | Facility: HOSPITAL | Age: 65
End: 2020-12-04

## 2020-12-04 ENCOUNTER — TELEPHONE (OUTPATIENT)
Dept: ONCOLOGY | Facility: HOSPITAL | Age: 65
End: 2020-12-04

## 2020-12-04 DIAGNOSIS — D64.9 ANEMIA, UNSPECIFIED TYPE: ICD-10-CM

## 2020-12-04 DIAGNOSIS — D47.1 MPN (MYELOPROLIFERATIVE NEOPLASM) (HCC): ICD-10-CM

## 2020-12-04 DIAGNOSIS — D75.839 THROMBOCYTOSIS: ICD-10-CM

## 2020-12-04 LAB
BASOPHILS # BLD AUTO: 0.09 10*3/MM3 (ref 0–0.2)
BASOPHILS NFR BLD AUTO: 1.4 % (ref 0–1.5)
DEPRECATED RDW RBC AUTO: 84.1 FL (ref 37–54)
EOSINOPHIL # BLD AUTO: 0.28 10*3/MM3 (ref 0–0.4)
EOSINOPHIL NFR BLD AUTO: 4.3 % (ref 0.3–6.2)
ERYTHROCYTE [DISTWIDTH] IN BLOOD BY AUTOMATED COUNT: 22.2 % (ref 12.3–15.4)
HCT VFR BLD AUTO: 32.1 % (ref 37.5–51)
HGB BLD-MCNC: 11 G/DL (ref 13–17.7)
IMM GRANULOCYTES # BLD AUTO: 0.02 10*3/MM3 (ref 0–0.05)
IMM GRANULOCYTES NFR BLD AUTO: 0.3 % (ref 0–0.5)
LYMPHOCYTES # BLD AUTO: 2.71 10*3/MM3 (ref 0.7–3.1)
LYMPHOCYTES NFR BLD AUTO: 41.4 % (ref 19.6–45.3)
MCH RBC QN AUTO: 36.5 PG (ref 26.6–33)
MCHC RBC AUTO-ENTMCNC: 34.3 G/DL (ref 31.5–35.7)
MCV RBC AUTO: 106.6 FL (ref 79–97)
MONOCYTES # BLD AUTO: 0.33 10*3/MM3 (ref 0.1–0.9)
MONOCYTES NFR BLD AUTO: 5 % (ref 5–12)
NEUTROPHILS NFR BLD AUTO: 3.11 10*3/MM3 (ref 1.7–7)
NEUTROPHILS NFR BLD AUTO: 47.6 % (ref 42.7–76)
NRBC BLD AUTO-RTO: 0.3 /100 WBC (ref 0–0.2)
PLATELET # BLD AUTO: 452 10*3/MM3 (ref 140–450)
PMV BLD AUTO: 11.1 FL (ref 6–12)
RBC # BLD AUTO: 3.01 10*6/MM3 (ref 4.14–5.8)
WBC # BLD AUTO: 6.54 10*3/MM3 (ref 3.4–10.8)

## 2020-12-04 PROCEDURE — 36415 COLL VENOUS BLD VENIPUNCTURE: CPT | Performed by: INTERNAL MEDICINE

## 2020-12-04 PROCEDURE — 85025 COMPLETE CBC W/AUTO DIFF WBC: CPT

## 2020-12-04 NOTE — TELEPHONE ENCOUNTER
----- Message from Vadim Song MD sent at 12/4/2020  3:41 PM CST -----  Please let patient know, his hemoglobin is 11, platelet count is 452,000.  Recommend continuing with hydroxyurea 1500 mg p.o. daily until next clinic visit in 2 weeks.  Thank you

## 2020-12-04 NOTE — TELEPHONE ENCOUNTER
Called and spoke with pt regarding lab results and taking medications prescribed by Dr. Song.  V/u obtained.

## 2020-12-17 ENCOUNTER — APPOINTMENT (OUTPATIENT)
Dept: ONCOLOGY | Facility: HOSPITAL | Age: 65
End: 2020-12-17

## 2020-12-17 ENCOUNTER — LAB (OUTPATIENT)
Dept: ONCOLOGY | Facility: HOSPITAL | Age: 65
End: 2020-12-17

## 2020-12-17 ENCOUNTER — DOCUMENTATION (OUTPATIENT)
Dept: ONCOLOGY | Facility: HOSPITAL | Age: 65
End: 2020-12-17

## 2020-12-17 DIAGNOSIS — D64.9 ANEMIA, UNSPECIFIED TYPE: ICD-10-CM

## 2020-12-17 DIAGNOSIS — D47.1 MPN (MYELOPROLIFERATIVE NEOPLASM) (HCC): ICD-10-CM

## 2020-12-17 DIAGNOSIS — D75.839 THROMBOCYTOSIS: ICD-10-CM

## 2020-12-17 LAB
ALBUMIN SERPL-MCNC: 4.3 G/DL (ref 3.5–5.2)
ALBUMIN/GLOB SERPL: 2 G/DL
ALP SERPL-CCNC: 47 U/L (ref 39–117)
ALT SERPL W P-5'-P-CCNC: 23 U/L (ref 1–41)
ANION GAP SERPL CALCULATED.3IONS-SCNC: 7 MMOL/L (ref 5–15)
AST SERPL-CCNC: 19 U/L (ref 1–40)
BASOPHILS # BLD AUTO: 0.03 10*3/MM3 (ref 0–0.2)
BASOPHILS NFR BLD AUTO: 0.9 % (ref 0–1.5)
BILIRUB SERPL-MCNC: 0.7 MG/DL (ref 0–1.2)
BUN SERPL-MCNC: 14 MG/DL (ref 8–23)
BUN/CREAT SERPL: 16.7 (ref 7–25)
CALCIUM SPEC-SCNC: 9.3 MG/DL (ref 8.6–10.5)
CHLORIDE SERPL-SCNC: 106 MMOL/L (ref 98–107)
CO2 SERPL-SCNC: 26 MMOL/L (ref 22–29)
CREAT SERPL-MCNC: 0.84 MG/DL (ref 0.76–1.27)
DEPRECATED RDW RBC AUTO: 84.8 FL (ref 37–54)
EOSINOPHIL # BLD AUTO: 0.07 10*3/MM3 (ref 0–0.4)
EOSINOPHIL NFR BLD AUTO: 2.2 % (ref 0.3–6.2)
ERYTHROCYTE [DISTWIDTH] IN BLOOD BY AUTOMATED COUNT: 23.6 % (ref 12.3–15.4)
GFR SERPL CREATININE-BSD FRML MDRD: 92 ML/MIN/1.73
GLOBULIN UR ELPH-MCNC: 2.2 GM/DL
GLUCOSE SERPL-MCNC: 126 MG/DL (ref 65–99)
HCT VFR BLD AUTO: 28.1 % (ref 37.5–51)
HGB BLD-MCNC: 9.6 G/DL (ref 13–17.7)
IMM GRANULOCYTES # BLD AUTO: 0.01 10*3/MM3 (ref 0–0.05)
IMM GRANULOCYTES NFR BLD AUTO: 0.3 % (ref 0–0.5)
LYMPHOCYTES # BLD AUTO: 1.21 10*3/MM3 (ref 0.7–3.1)
LYMPHOCYTES NFR BLD AUTO: 37.5 % (ref 19.6–45.3)
MCH RBC QN AUTO: 35.8 PG (ref 26.6–33)
MCHC RBC AUTO-ENTMCNC: 34.2 G/DL (ref 31.5–35.7)
MCV RBC AUTO: 104.9 FL (ref 79–97)
MONOCYTES # BLD AUTO: 0.16 10*3/MM3 (ref 0.1–0.9)
MONOCYTES NFR BLD AUTO: 5 % (ref 5–12)
NEUTROPHILS NFR BLD AUTO: 1.75 10*3/MM3 (ref 1.7–7)
NEUTROPHILS NFR BLD AUTO: 54.1 % (ref 42.7–76)
NRBC BLD AUTO-RTO: 0.9 /100 WBC (ref 0–0.2)
PLATELET # BLD AUTO: 368 10*3/MM3 (ref 140–450)
PMV BLD AUTO: 11 FL (ref 6–12)
POTASSIUM SERPL-SCNC: 4 MMOL/L (ref 3.5–5.2)
PROT SERPL-MCNC: 6.5 G/DL (ref 6–8.5)
RBC # BLD AUTO: 2.68 10*6/MM3 (ref 4.14–5.8)
SODIUM SERPL-SCNC: 139 MMOL/L (ref 136–145)
WBC # BLD AUTO: 3.23 10*3/MM3 (ref 3.4–10.8)

## 2020-12-17 PROCEDURE — 85025 COMPLETE CBC W/AUTO DIFF WBC: CPT

## 2020-12-17 PROCEDURE — 80053 COMPREHEN METABOLIC PANEL: CPT

## 2020-12-18 ENCOUNTER — TELEMEDICINE (OUTPATIENT)
Dept: ONCOLOGY | Facility: CLINIC | Age: 65
End: 2020-12-18

## 2020-12-18 DIAGNOSIS — D64.9 ANEMIA, UNSPECIFIED TYPE: ICD-10-CM

## 2020-12-18 DIAGNOSIS — D72.818 OTHER DECREASED WHITE BLOOD CELL (WBC) COUNT: ICD-10-CM

## 2020-12-18 DIAGNOSIS — D47.1 MPN (MYELOPROLIFERATIVE NEOPLASM) (HCC): Primary | ICD-10-CM

## 2020-12-18 DIAGNOSIS — R53.83 OTHER FATIGUE: ICD-10-CM

## 2020-12-18 PROCEDURE — 99214 OFFICE O/P EST MOD 30 MIN: CPT | Performed by: INTERNAL MEDICINE

## 2020-12-18 RX ORDER — HYDROXYUREA 500 MG/1
CAPSULE ORAL
Qty: 60 CAPSULE | Refills: 1 | Status: SHIPPED | OUTPATIENT
Start: 2020-12-18 | End: 2021-01-18 | Stop reason: SDUPTHER

## 2020-12-18 NOTE — PROGRESS NOTES
DATE OF VISIT: 12/18/2020    You have chosen to receive care through a telehealth visit.  Do you consent to use a video/audio connection for your medical care today? Yes       REASON FOR VISIT: MPN/MDS overlap syndrome, anemia, thrombocytosis      HISTORY OF PRESENT ILLNESS:    65-year-old male with medical problems significant for hypertension, recurrent sinus infection, MPN/MDS overlap syndrome for which patient is currently on hydroxyurea 1500 mg p.o. daily since November 20, 2020.  Patient is here for follow-up appointment today.  Patient had a blood work done recently, is here to discuss the result.  Complains of worsening fatigue fatigue.  Denies any bleeding.  Denies any ankle ulceration.  Denies any new shortness of breath or chest pain or palpitation.        Oncology history:    1.  MPN/MDS overlap syndrome:  -Bone marrow biopsy done at [place] on March 23, 2020 is consistent with MPN/MDS overlap syndrome.  - Patient was on hydroxyurea until September 17, 2020 due to severe anemia with hemoglobin of 7.3 and neutropenia with absolute neutrophil count of 1.45 hydroxyurea has been discontinued.  - CBC done on September 25, 2020 shows white blood cell count is 3.97, hemoglobin is 7.7, platelet count is 432,000.     -Hydroxyurea was discontinued on September 17 2020.  -Hydroxyurea was started back on October 2, 2020 at dose of 500 mg p.o. daily.  - dose of hydroxyurea was increased to 1500 mg p.o. daily on November 20, 2020.        PAST MEDICAL HISTORY:    Past Medical History:   Diagnosis Date   • Chest pain 7/11/2020    -Likely 2/2 to anxiety.  -SOLOMON therapy -Troponin negative x2. Refusing another lab draw for 3rd troponin. -Cardiac telemetry -CTA coronary showed calcium score 70. Calcified plaque in mid RCA approaching 70% narrowing. Per Dr. Newman's interpretation.   • Hepatitis B    • Hypertension    • MPN (myeloproliferative neoplasm) (CMS/HCC)    • Sinus infection        SOCIAL HISTORY:    Social  History     Tobacco Use   • Smoking status: Former Smoker   • Smokeless tobacco: Never Used   • Tobacco comment: quit in early 21 years old   Substance Use Topics   • Alcohol use: Yes     Alcohol/week: 1.0 standard drinks     Types: 1 Cans of beer per week     Comment: occasionally   • Drug use: Never       Surgical History :  Past Surgical History:   Procedure Laterality Date   • COLONOSCOPY     • HEMORRHOID BANDING         ALLERGIES:    Allergies   Allergen Reactions   • Other Rash     Pt was working with walnut wood and broke out in a rash   • Tetracycline Rash         FAMILY HISTORY:  Family History   Problem Relation Age of Onset   • Diabetes Father    • Cirrhosis Father    • Cancer Sister    • Cancer Maternal Uncle            REVIEW OF SYSTEMS:      CONSTITUTIONAL:  Complains of worsening fatigue. Denies any fever, chills or weight loss.     EYES: No visual disturbances. No discharge. No new lesions    ENMT:  No epistaxis, mouth sores or difficulty swallowing.    RESPIRATORY:  No new shortness of breath. No new cough or hemoptysis.    CARDIOVASCULAR:  No chest pain or palpitations.    GASTROINTESTINAL:  No abdominal pain nausea, vomiting or blood in the stool.    GENITOURINARY: No Dysuria or Hematuria.    MUSCULOSKELETAL:  No new back pain or arthralgia..    LYMPHATICS:  Denies any abnormal swollen glands anywhere in the body.    NEUROLOGICAL : No tingling or numbness. No headache or dizziness. No seizures or balance problems.    SKIN: No new skin lesions.    ENDOCRINE : No new hot or cold intolerance. No new polyuria . No polydipsia.        PHYSICAL EXAMINATION:      VITAL SIGNS:  Afebrile(per patient), RR-16      ECOG performance status: 2    CONSTITUTIONAL:  Not in any distress.    EYES:  Extraocular Movements intact.No ptosis.    ENMT:  Normocephalic, Atraumatic.No Facial Asymmetry noted.    NECK:  No  visible adenopathy.    RESPIRATORY:  Fair respiratory effort.    MUSCULOSKELETAL:  Normal range of  motion.    NEUROLOGIC:    No  Motor  deficit appreciated.  Moving all 4 extremities appropriately.    SKIN : No new skin lesion lesions.    LYMPHATICS: No visible enlarged lymph nodes in neck or supraclavicular area.    PSYCHIATRY: Alert, awake and oriented ×3.  Normal affect.  Normal judgment.        DIAGNOSTIC DATA:    Glucose   Date Value Ref Range Status   12/17/2020 126 (H) 65 - 99 mg/dL Final     Sodium   Date Value Ref Range Status   12/17/2020 139 136 - 145 mmol/L Final     Potassium   Date Value Ref Range Status   12/17/2020 4.0 3.5 - 5.2 mmol/L Final     CO2   Date Value Ref Range Status   12/17/2020 26.0 22.0 - 29.0 mmol/L Final     Chloride   Date Value Ref Range Status   12/17/2020 106 98 - 107 mmol/L Final     Anion Gap   Date Value Ref Range Status   12/17/2020 7.0 5.0 - 15.0 mmol/L Final     Creatinine   Date Value Ref Range Status   12/17/2020 0.84 0.76 - 1.27 mg/dL Final     BUN   Date Value Ref Range Status   12/17/2020 14 8 - 23 mg/dL Final     BUN/Creatinine Ratio   Date Value Ref Range Status   12/17/2020 16.7 7.0 - 25.0 Final     Calcium   Date Value Ref Range Status   12/17/2020 9.3 8.6 - 10.5 mg/dL Final     eGFR Non  Amer   Date Value Ref Range Status   12/17/2020 92 >60 mL/min/1.73 Final     Alkaline Phosphatase   Date Value Ref Range Status   12/17/2020 47 39 - 117 U/L Final     Total Protein   Date Value Ref Range Status   12/17/2020 6.5 6.0 - 8.5 g/dL Final     ALT (SGPT)   Date Value Ref Range Status   12/17/2020 23 1 - 41 U/L Final     AST (SGOT)   Date Value Ref Range Status   12/17/2020 19 1 - 40 U/L Final     Total Bilirubin   Date Value Ref Range Status   12/17/2020 0.7 0.0 - 1.2 mg/dL Final     Albumin   Date Value Ref Range Status   12/17/2020 4.30 3.50 - 5.20 g/dL Final     Globulin   Date Value Ref Range Status   12/17/2020 2.2 gm/dL Final     Lab Results   Component Value Date    WBC 3.23 (L) 12/17/2020    HGB 9.6 (L) 12/17/2020    HCT 28.1 (L) 12/17/2020    .9  (H) 12/17/2020     12/17/2020     Lab Results   Component Value Date    NEUTROABS 1.75 12/17/2020    IRON 109 05/28/2020    TIBC 301 05/28/2020    LABIRON 36 05/28/2020    FERRITIN 687.00 (H) 05/28/2020    [id number] >2,000 (H) 05/28/2020    FOLATE 11.70 05/28/2020     No results found for: , LABCA2, AFPTM, HCGQUANT, , CHROMGRNA, [id number], CEA, REFLABREPO        PATHOLOGY:  * Cannot find OR log *         RADIOLOGY DATA :  No radiology results for the last 7 days      ASSESSMENT AND PLAN:      1.  MPN/MDS overlap syndrome:  -Please review oncology history for prior treatment details  -Patient is currently on hydroxyurea 1500 mg p.o. daily since November 20, 2020  -CBC done on December 17, 2020 shows white blood cell count is 3.23 with absolute neutrophil count of 1.75, hemoglobin is decreased to 9.6, platelet count is 368,000.  -Due to worsening anemia and new onset of leukopenia, recommend holding hydroxyurea for next 7 days.  -Recommend resuming hydroxyurea 500 mg twice daily from December 25, 2020.  -We will recheck CBC in 3 weeks from now.  -We will ask patient to return to clinic in 5 weeks from now with repeat CBC and CMP to be done prior to that.    2.  Leukopenia and anemia:  -Secondary to MDS/MPN overlap syndrome plus hydroxyurea.  -As mentioned earlier will hold hydroxyurea for 4 days and then resume at a lower rate.    3.  Health maintenance: Patient does not smoke    4. Advance Care Planning: For now patient remains full code and is able to make decisions.  Patient has health care surrogate mentioned on chart.     5.  Prescriptions: Patient is in a prescription for hydroxyurea        PHQ-9 Total Score:       Marv Messina reports a pain score of 0.  Given his pain assessment as noted, treatment options were discussed and the following options were decided upon as a follow-up plan to address the patient's pain: continuation of current treatment plan for pain.               This  visit was completed as a video visit due to ongoing pandemic with coronavirus.    I did  complete this video visit with the patient using [employer].       Vadim Song MD  12/18/2020  10:05 CST          Part of this note may be an electronic transcription/translation of spoken language to printed text using the Dragon Dictation System.

## 2020-12-18 NOTE — TELEPHONE ENCOUNTER
PATIENT WOULD LIKE THE FOLLOWING MEDS FILLED:    REFILL:hydroxyurea (HYDREA) 500 MG capsule [65535] (Order      PREFERRED PHARMACY:   [facility]   [address] [place] [address] [place] [address]  PH: [phone]    QUANTITY LEFT: 2       BEST C/B: [phone]    PLEASE TAKE OUT [facility]

## 2021-01-08 ENCOUNTER — TELEPHONE (OUTPATIENT)
Dept: ONCOLOGY | Facility: CLINIC | Age: 66
End: 2021-01-08

## 2021-01-08 ENCOUNTER — LAB (OUTPATIENT)
Dept: ONCOLOGY | Facility: HOSPITAL | Age: 66
End: 2021-01-08

## 2021-01-08 DIAGNOSIS — D47.1 MPN (MYELOPROLIFERATIVE NEOPLASM) (HCC): ICD-10-CM

## 2021-01-08 LAB
ANISOCYTOSIS BLD QL: ABNORMAL
BASOPHILS # BLD MANUAL: 0.16 10*3/MM3 (ref 0–0.2)
BASOPHILS NFR BLD AUTO: 2 % (ref 0–1.5)
DEPRECATED RDW RBC AUTO: 105.4 FL (ref 37–54)
EOSINOPHIL # BLD MANUAL: 0.08 10*3/MM3 (ref 0–0.4)
EOSINOPHIL NFR BLD MANUAL: 1 % (ref 0.3–6.2)
ERYTHROCYTE [DISTWIDTH] IN BLOOD BY AUTOMATED COUNT: 27 % (ref 12.3–15.4)
HCT VFR BLD AUTO: 33 % (ref 37.5–51)
HGB BLD-MCNC: 11.2 G/DL (ref 13–17.7)
HYPOCHROMIA BLD QL: ABNORMAL
LYMPHOCYTES # BLD MANUAL: 3.17 10*3/MM3 (ref 0.7–3.1)
LYMPHOCYTES NFR BLD MANUAL: 39 % (ref 19.6–45.3)
LYMPHOCYTES NFR BLD MANUAL: 5 % (ref 5–12)
MACROCYTES BLD QL SMEAR: ABNORMAL
MCH RBC QN AUTO: 37.1 PG (ref 26.6–33)
MCHC RBC AUTO-ENTMCNC: 33.9 G/DL (ref 31.5–35.7)
MCV RBC AUTO: 109.3 FL (ref 79–97)
MONOCYTES # BLD AUTO: 0.41 10*3/MM3 (ref 0.1–0.9)
MYELOCYTES NFR BLD MANUAL: 1 % (ref 0–0)
NEUTROPHILS # BLD AUTO: 3.99 10*3/MM3 (ref 1.7–7)
NEUTROPHILS NFR BLD MANUAL: 48 % (ref 42.7–76)
NEUTS BAND NFR BLD MANUAL: 1 % (ref 0–5)
OVALOCYTES BLD QL SMEAR: ABNORMAL
PLATELET # BLD AUTO: 809 10*3/MM3 (ref 140–450)
PMV BLD AUTO: 10.6 FL (ref 6–12)
POLYCHROMASIA BLD QL SMEAR: ABNORMAL
RBC # BLD AUTO: 3.02 10*6/MM3 (ref 4.14–5.8)
SMALL PLATELETS BLD QL SMEAR: ABNORMAL
VARIANT LYMPHS NFR BLD MANUAL: 3 % (ref 0–5)
WBC # BLD AUTO: 8.14 10*3/MM3 (ref 3.4–10.8)
WBC MORPH BLD: NORMAL

## 2021-01-08 PROCEDURE — 36415 COLL VENOUS BLD VENIPUNCTURE: CPT | Performed by: INTERNAL MEDICINE

## 2021-01-08 PROCEDURE — 85007 BL SMEAR W/DIFF WBC COUNT: CPT

## 2021-01-08 PROCEDURE — 85025 COMPLETE CBC W/AUTO DIFF WBC: CPT

## 2021-01-08 NOTE — TELEPHONE ENCOUNTER
Caller: wilfred    Relationship to patient: self    Best call back number: [phone]    Pt is calling for Nova to give him a call for the lab results from today 1/8/21.

## 2021-01-20 RX ORDER — HYDROXYUREA 500 MG/1
CAPSULE ORAL
Qty: 90 CAPSULE | Refills: 1 | Status: SHIPPED | OUTPATIENT
Start: 2021-01-20 | End: 2021-02-12 | Stop reason: SDUPTHER

## 2021-01-21 ENCOUNTER — LAB (OUTPATIENT)
Dept: ONCOLOGY | Facility: HOSPITAL | Age: 66
End: 2021-01-21

## 2021-01-21 ENCOUNTER — TELEPHONE (OUTPATIENT)
Dept: ONCOLOGY | Facility: CLINIC | Age: 66
End: 2021-01-21

## 2021-01-21 DIAGNOSIS — R53.83 OTHER FATIGUE: ICD-10-CM

## 2021-01-21 DIAGNOSIS — D47.1 MPN (MYELOPROLIFERATIVE NEOPLASM) (HCC): ICD-10-CM

## 2021-01-21 DIAGNOSIS — D64.9 ANEMIA, UNSPECIFIED TYPE: ICD-10-CM

## 2021-01-21 DIAGNOSIS — D72.818 OTHER DECREASED WHITE BLOOD CELL (WBC) COUNT: ICD-10-CM

## 2021-01-21 LAB
ANION GAP SERPL CALCULATED.3IONS-SCNC: 10 MMOL/L (ref 5–15)
BASOPHILS # BLD AUTO: 0.13 10*3/MM3 (ref 0–0.2)
BASOPHILS NFR BLD AUTO: 1.6 % (ref 0–1.5)
BUN SERPL-MCNC: 15 MG/DL (ref 8–23)
BUN/CREAT SERPL: 14.6 (ref 7–25)
CALCIUM SPEC-SCNC: 9.4 MG/DL (ref 8.6–10.5)
CHLORIDE SERPL-SCNC: 105 MMOL/L (ref 98–107)
CO2 SERPL-SCNC: 25 MMOL/L (ref 22–29)
CREAT SERPL-MCNC: 1.03 MG/DL (ref 0.76–1.27)
DEPRECATED RDW RBC AUTO: 100.3 FL (ref 37–54)
EOSINOPHIL # BLD AUTO: 0.23 10*3/MM3 (ref 0–0.4)
EOSINOPHIL NFR BLD AUTO: 2.9 % (ref 0.3–6.2)
ERYTHROCYTE [DISTWIDTH] IN BLOOD BY AUTOMATED COUNT: 25.9 % (ref 12.3–15.4)
GFR SERPL CREATININE-BSD FRML MDRD: 73 ML/MIN/1.73
GLUCOSE SERPL-MCNC: 94 MG/DL (ref 65–99)
HCT VFR BLD AUTO: 32.7 % (ref 37.5–51)
HGB BLD-MCNC: 11.3 G/DL (ref 13–17.7)
HOLD SPECIMEN: NORMAL
IMM GRANULOCYTES # BLD AUTO: 0.04 10*3/MM3 (ref 0–0.05)
IMM GRANULOCYTES NFR BLD AUTO: 0.5 % (ref 0–0.5)
LYMPHOCYTES # BLD AUTO: 2.38 10*3/MM3 (ref 0.7–3.1)
LYMPHOCYTES NFR BLD AUTO: 30.1 % (ref 19.6–45.3)
MCH RBC QN AUTO: 37 PG (ref 26.6–33)
MCHC RBC AUTO-ENTMCNC: 34.6 G/DL (ref 31.5–35.7)
MCV RBC AUTO: 107.2 FL (ref 79–97)
MONOCYTES # BLD AUTO: 0.46 10*3/MM3 (ref 0.1–0.9)
MONOCYTES NFR BLD AUTO: 5.8 % (ref 5–12)
NEUTROPHILS NFR BLD AUTO: 4.66 10*3/MM3 (ref 1.7–7)
NEUTROPHILS NFR BLD AUTO: 59.1 % (ref 42.7–76)
NRBC BLD AUTO-RTO: 0.6 /100 WBC (ref 0–0.2)
PLATELET # BLD AUTO: 575 10*3/MM3 (ref 140–450)
PMV BLD AUTO: 10.5 FL (ref 6–12)
POTASSIUM SERPL-SCNC: 4.3 MMOL/L (ref 3.5–5.2)
RBC # BLD AUTO: 3.05 10*6/MM3 (ref 4.14–5.8)
SODIUM SERPL-SCNC: 140 MMOL/L (ref 136–145)
WBC # BLD AUTO: 7.9 10*3/MM3 (ref 3.4–10.8)

## 2021-01-21 PROCEDURE — 36415 COLL VENOUS BLD VENIPUNCTURE: CPT | Performed by: INTERNAL MEDICINE

## 2021-01-21 PROCEDURE — 36415 COLL VENOUS BLD VENIPUNCTURE: CPT

## 2021-01-21 PROCEDURE — 80048 BASIC METABOLIC PNL TOTAL CA: CPT

## 2021-01-21 PROCEDURE — 85025 COMPLETE CBC W/AUTO DIFF WBC: CPT

## 2021-01-21 NOTE — TELEPHONE ENCOUNTER
Caller: wilfred    Relationship to patient: self    Best call back number: [phone]    Pt is looking for his hemoglobin and platelet levels from labs done today 1/21/21.

## 2021-01-21 NOTE — TELEPHONE ENCOUNTER
Caller: ROSANA NINA    Relationship to patient: SELF     Best call back number: [phone]    PT STATED HE HAD HAD HIS LABS DONE OUTSIDE PREVIOUSLY AND ASKS IF THAT IS AVAILABLE AGAIN FOR HIS LABS SCHEDULED TODAY 01/21

## 2021-01-21 NOTE — TELEPHONE ENCOUNTER
Called and spoke with pt regarding the availability of labs outside, in which we are not doing right now.  Pt gives v/u.

## 2021-01-22 ENCOUNTER — TELEMEDICINE (OUTPATIENT)
Dept: ONCOLOGY | Facility: CLINIC | Age: 66
End: 2021-01-22

## 2021-01-22 DIAGNOSIS — D46.9 MDS/MPN (MYELODYSPLASTIC/MYELOPROLIFERATIVE NEOPLASMS) (HCC): ICD-10-CM

## 2021-01-22 DIAGNOSIS — D64.9 ANEMIA, UNSPECIFIED TYPE: ICD-10-CM

## 2021-01-22 DIAGNOSIS — D47.1 MPN (MYELOPROLIFERATIVE NEOPLASM) (HCC): Primary | ICD-10-CM

## 2021-01-22 DIAGNOSIS — D75.839 THROMBOCYTOSIS: ICD-10-CM

## 2021-01-22 PROBLEM — R53.83 OTHER FATIGUE: Chronic | Status: ACTIVE | Noted: 2020-02-26

## 2021-01-22 PROCEDURE — 99214 OFFICE O/P EST MOD 30 MIN: CPT | Performed by: INTERNAL MEDICINE

## 2021-01-22 NOTE — PROGRESS NOTES
DATE OF VISIT: 1/22/2021    You have chosen to receive care through a telehealth visit.  Do you consent to use a video/audio connection for your medical care today? Yes       REASON FOR VISIT: MPN/MDS overlap syndrome, anemia, thrombocytosis      HISTORY OF PRESENT ILLNESS:   65-year-old male with medical problems significant for hypertension, recurrent sinus infection, MPN/MDS overlap syndrome for which patient is currently on hydroxyurea 1500 mg p.o. on Monday Wednesday Friday and rest of the week taking 1000 mg p.o. daily is here for follow-up appointment today to discuss recently done blood work.  Complains of fatigue.  Denies any bleeding.  Denies any new lymph node enlargement.  Denies any ankle ulceration.      Oncology history:    1.  MPN/MDS overlap syndrome:  -Bone marrow biopsy done at [place] on March 23, 2020 is consistent with MPN/MDS overlap syndrome.  - Patient was on hydroxyurea until September 17, 2020 due to severe anemia with hemoglobin of 7.3 and neutropenia with absolute neutrophil count of 1.45 hydroxyurea has been discontinued.  - CBC done on September 25, 2020 shows white blood cell count is 3.97, hemoglobin is 7.7, platelet count is 432,000.     -Hydroxyurea was discontinued on September 17 2020.  -Hydroxyurea was started back on October 2, 2020 at dose of 500 mg p.o. daily.  - dose of hydroxyurea was increased to 1500 mg p.o. daily on November 20, 2020.  -Due to worsening cytopenia, dose of hydroxyurea was changed to 1500 mg p.o. on Monday Wednesday Friday and rest of the week 1000 mg on January 8, 2021      PAST MEDICAL HISTORY:    Past Medical History:   Diagnosis Date   • Chest pain 7/11/2020    -Likely 2/2 to anxiety.  -SOLOMON therapy -Troponin negative x2. Refusing another lab draw for 3rd troponin. -Cardiac telemetry -CTA coronary showed calcium score 70. Calcified plaque in mid RCA approaching 70% narrowing. Per Dr. Newman's interpretation.   • Hepatitis B    • Hypertension    •  MPN (myeloproliferative neoplasm) (CMS/HCC)    • Sinus infection        SOCIAL HISTORY:    Social History     Tobacco Use   • Smoking status: Former Smoker   • Smokeless tobacco: Never Used   • Tobacco comment: quit in early 21 years old   Substance Use Topics   • Alcohol use: Yes     Alcohol/week: 1.0 standard drinks     Types: 1 Cans of beer per week     Comment: occasionally   • Drug use: Never       Surgical History :  Past Surgical History:   Procedure Laterality Date   • COLONOSCOPY     • HEMORRHOID BANDING         ALLERGIES:    Allergies   Allergen Reactions   • Other Rash     Pt was working with walnut wood and broke out in a rash   • Tetracycline Rash         FAMILY HISTORY:  Family History   Problem Relation Age of Onset   • Diabetes Father    • Cirrhosis Father    • Cancer Sister    • Cancer Maternal Uncle            REVIEW OF SYSTEMS:      CONSTITUTIONAL:  Complains of fatigue. Denies any fever, chills or weight loss.     EYES: No visual disturbances. No discharge. No new lesions    ENMT:  No epistaxis, mouth sores or difficulty swallowing.    RESPIRATORY:  No new shortness of breath. No new cough or hemoptysis.    CARDIOVASCULAR:  No chest pain or palpitations.    GASTROINTESTINAL:  No abdominal pain nausea, vomiting or blood in the stool.    GENITOURINARY: No Dysuria or Hematuria.    MUSCULOSKELETAL:  No new back pain or arthralgia..    LYMPHATICS:  Denies any abnormal swollen glands anywhere in the body.    NEUROLOGICAL : No tingling or numbness. No headache or dizziness. No seizures or balance problems.    SKIN: No new skin lesions.    ENDOCRINE : No new hot or cold intolerance. No new polyuria . No polydipsia.        PHYSICAL EXAMINATION:      VITAL SIGNS:  Afebrile(per patient), RR-16      ECOG performance status: 1    CONSTITUTIONAL:  Not in any distress.    EYES:  Extraocular Movements intact.No ptosis.    ENMT:  Normocephalic, Atraumatic.No Facial Asymmetry noted.    NECK:  No  visible  adenopathy.    RESPIRATORY:  Fair respiratory effort.    MUSCULOSKELETAL:  Normal range of motion.    NEUROLOGIC:    No  Motor  deficit appreciated.  Moving all 4 extremities appropriately.    SKIN : No new skin lesion lesions.    LYMPHATICS: No visible enlarged lymph nodes in neck or supraclavicular area.    PSYCHIATRY: Alert, awake and oriented ×3.  Normal affect.  Normal judgment.        DIAGNOSTIC DATA:    Glucose   Date Value Ref Range Status   01/21/2021 94 65 - 99 mg/dL Final     Sodium   Date Value Ref Range Status   01/21/2021 140 136 - 145 mmol/L Final     Potassium   Date Value Ref Range Status   01/21/2021 4.3 3.5 - 5.2 mmol/L Final     Comment:     Slight hemolysis detected by analyzer. Results may be affected.     CO2   Date Value Ref Range Status   01/21/2021 25.0 22.0 - 29.0 mmol/L Final     Chloride   Date Value Ref Range Status   01/21/2021 105 98 - 107 mmol/L Final     Anion Gap   Date Value Ref Range Status   01/21/2021 10.0 5.0 - 15.0 mmol/L Final     Creatinine   Date Value Ref Range Status   01/21/2021 1.03 0.76 - 1.27 mg/dL Final     BUN   Date Value Ref Range Status   01/21/2021 15 8 - 23 mg/dL Final     BUN/Creatinine Ratio   Date Value Ref Range Status   01/21/2021 14.6 7.0 - 25.0 Final     Calcium   Date Value Ref Range Status   01/21/2021 9.4 8.6 - 10.5 mg/dL Final     eGFR Non  Amer   Date Value Ref Range Status   01/21/2021 73 >60 mL/min/1.73 Final     Alkaline Phosphatase   Date Value Ref Range Status   12/17/2020 47 39 - 117 U/L Final     Total Protein   Date Value Ref Range Status   12/17/2020 6.5 6.0 - 8.5 g/dL Final     ALT (SGPT)   Date Value Ref Range Status   12/17/2020 23 1 - 41 U/L Final     AST (SGOT)   Date Value Ref Range Status   12/17/2020 19 1 - 40 U/L Final     Total Bilirubin   Date Value Ref Range Status   12/17/2020 0.7 0.0 - 1.2 mg/dL Final     Albumin   Date Value Ref Range Status   12/17/2020 4.30 3.50 - 5.20 g/dL Final     Globulin   Date Value Ref Range  Status   12/17/2020 2.2 gm/dL Final     Lab Results   Component Value Date    WBC 7.90 01/21/2021    HGB 11.3 (L) 01/21/2021    HCT 32.7 (L) 01/21/2021    .2 (H) 01/21/2021     (H) 01/21/2021     Lab Results   Component Value Date    NEUTROABS 4.66 01/21/2021    IRON 109 05/28/2020    TIBC 301 05/28/2020    LABIRON 36 05/28/2020    FERRITIN 687.00 (H) 05/28/2020    [id number] >2,000 (H) 05/28/2020    FOLATE 11.70 05/28/2020     No results found for: , LABCA2, AFPTM, HCGQUANT, , CHROMGRNA, [id number], CEA, REFLABREPO        PATHOLOGY:  * Cannot find OR log *         RADIOLOGY DATA :  No radiology results for the last 7 days      ASSESSMENT AND PLAN:      1.  MPN/MDS overlap syndrome:  -Review oncology history for prior treatment details  -Patient is currently on 1500 mg p.o. on Monday Wednesday Friday and rest of the week taking 1000 mg p.o. daily  -CBC done on January 21, 2021 shows hemoglobin is 11.3, white blood cell count is 7.9, platelet count is 575,000.  -Patient is not able to tolerate any higher than this dose of hydroxyurea due to worsening anemia and severe fatigue.  -We will continue with the same dose of hydroxyurea for now  -We will recheck CBC  in 3 weeks from now.  -We will ask patient to return to clinic in 6 months with repeat CBC and CMP to be done prior to that.    2.  Anemia:  -Secondary to MDS/MPN overlap syndrome plus hydroxyurea  -We will continue with hydroxyurea for now.    3.  Health maintenance: Patient does not smoke    4. Advance Care Planning: For now patient remains full code and is able to make decisions.  Patient has health care surrogate mentioned on chart.             PHQ-9 Total Score:       Marv Messina reports a pain score of 0.  Given his pain assessment as noted, treatment options were discussed and the following options were decided upon as a follow-up plan to address the patient's pain: No acute intervention required.               This visit  was completed as a video visit due to ongoing pandemic with coronavirus.    I did not complete this video visit with the patient using [employer].       Vadim Song MD  1/22/2021  09:35 CST          Part of this note may be an electronic transcription/translation of spoken language to printed text using the Dragon Dictation System.

## 2021-02-12 ENCOUNTER — LAB (OUTPATIENT)
Dept: ONCOLOGY | Facility: HOSPITAL | Age: 66
End: 2021-02-12

## 2021-02-12 ENCOUNTER — TELEPHONE (OUTPATIENT)
Dept: ONCOLOGY | Facility: HOSPITAL | Age: 66
End: 2021-02-12

## 2021-02-12 DIAGNOSIS — D75.839 THROMBOCYTOSIS: ICD-10-CM

## 2021-02-12 DIAGNOSIS — D46.9 MDS/MPN (MYELODYSPLASTIC/MYELOPROLIFERATIVE NEOPLASMS) (HCC): ICD-10-CM

## 2021-02-12 DIAGNOSIS — D64.9 ANEMIA, UNSPECIFIED TYPE: ICD-10-CM

## 2021-02-12 LAB
ANISOCYTOSIS BLD QL: ABNORMAL
BASOPHILS # BLD MANUAL: 0.14 10*3/MM3 (ref 0–0.2)
BASOPHILS NFR BLD AUTO: 2 % (ref 0–1.5)
DEPRECATED RDW RBC AUTO: 96.5 FL (ref 37–54)
EOSINOPHIL # BLD MANUAL: 0.07 10*3/MM3 (ref 0–0.4)
EOSINOPHIL NFR BLD MANUAL: 1 % (ref 0.3–6.2)
ERYTHROCYTE [DISTWIDTH] IN BLOOD BY AUTOMATED COUNT: 25 % (ref 12.3–15.4)
HCT VFR BLD AUTO: 29.1 % (ref 37.5–51)
HGB BLD-MCNC: 10.2 G/DL (ref 13–17.7)
HYPOCHROMIA BLD QL: ABNORMAL
LYMPHOCYTES # BLD MANUAL: 2.21 10*3/MM3 (ref 0.7–3.1)
LYMPHOCYTES NFR BLD MANUAL: 32 % (ref 19.6–45.3)
LYMPHOCYTES NFR BLD MANUAL: 4 % (ref 5–12)
MACROCYTES BLD QL SMEAR: ABNORMAL
MCH RBC QN AUTO: 38.9 PG (ref 26.6–33)
MCHC RBC AUTO-ENTMCNC: 35.1 G/DL (ref 31.5–35.7)
MCV RBC AUTO: 111.1 FL (ref 79–97)
MONOCYTES # BLD AUTO: 0.28 10*3/MM3 (ref 0.1–0.9)
NEUTROPHILS # BLD AUTO: 3.8 10*3/MM3 (ref 1.7–7)
NEUTROPHILS NFR BLD MANUAL: 54 % (ref 42.7–76)
NEUTS BAND NFR BLD MANUAL: 1 % (ref 0–5)
NRBC SPEC MANUAL: 3 /100 WBC (ref 0–0.2)
PLATELET # BLD AUTO: 540 10*3/MM3 (ref 140–450)
PMV BLD AUTO: 10.8 FL (ref 6–12)
POLYCHROMASIA BLD QL SMEAR: ABNORMAL
RBC # BLD AUTO: 2.62 10*6/MM3 (ref 4.14–5.8)
SMALL PLATELETS BLD QL SMEAR: ABNORMAL
VARIANT LYMPHS NFR BLD MANUAL: 6 % (ref 0–5)
WBC # BLD AUTO: 6.91 10*3/MM3 (ref 3.4–10.8)
WBC MORPH BLD: NORMAL

## 2021-02-12 PROCEDURE — 36415 COLL VENOUS BLD VENIPUNCTURE: CPT | Performed by: INTERNAL MEDICINE

## 2021-02-12 PROCEDURE — 85025 COMPLETE CBC W/AUTO DIFF WBC: CPT

## 2021-02-12 PROCEDURE — 85007 BL SMEAR W/DIFF WBC COUNT: CPT

## 2021-02-12 RX ORDER — HYDROXYUREA 500 MG/1
CAPSULE ORAL
Qty: 90 CAPSULE | Refills: 1 | Status: SHIPPED | OUTPATIENT
Start: 2021-02-12 | End: 2021-03-26 | Stop reason: SDUPTHER

## 2021-02-12 NOTE — TELEPHONE ENCOUNTER
----- Message from Vadim Song MD sent at 2/12/2021 11:55 AM CST -----  Please let patient know, his hemoglobin is 10.2, platelet count is 540,000.  Recommend decreasing hydroxyurea to 1500 mg on Monday and Wednesday.  Rest of the week recommend continuing with 1000 mg p.o. daily.  Thank you

## 2021-03-04 ENCOUNTER — LAB (OUTPATIENT)
Dept: ONCOLOGY | Facility: HOSPITAL | Age: 66
End: 2021-03-04

## 2021-03-04 DIAGNOSIS — D47.1 MPN (MYELOPROLIFERATIVE NEOPLASM) (HCC): ICD-10-CM

## 2021-03-04 DIAGNOSIS — D75.839 THROMBOCYTOSIS: ICD-10-CM

## 2021-03-04 DIAGNOSIS — D64.9 ANEMIA, UNSPECIFIED TYPE: ICD-10-CM

## 2021-03-04 DIAGNOSIS — D46.9 MDS/MPN (MYELODYSPLASTIC/MYELOPROLIFERATIVE NEOPLASMS) (HCC): ICD-10-CM

## 2021-03-04 LAB
ANION GAP SERPL CALCULATED.3IONS-SCNC: 10 MMOL/L (ref 5–15)
BASOPHILS # BLD AUTO: 0.1 10*3/MM3 (ref 0–0.2)
BASOPHILS # BLD MANUAL: 0.07 10*3/MM3 (ref 0–0.2)
BASOPHILS NFR BLD AUTO: 1 % (ref 0–1.5)
BASOPHILS NFR BLD AUTO: 1.5 % (ref 0–1.5)
BUN SERPL-MCNC: 11 MG/DL (ref 8–23)
BUN/CREAT SERPL: 12.6 (ref 7–25)
CALCIUM SPEC-SCNC: 9.7 MG/DL (ref 8.6–10.5)
CHLORIDE SERPL-SCNC: 101 MMOL/L (ref 98–107)
CO2 SERPL-SCNC: 24 MMOL/L (ref 22–29)
CREAT SERPL-MCNC: 0.87 MG/DL (ref 0.76–1.27)
DEPRECATED RDW RBC AUTO: 96.7 FL (ref 37–54)
EOSINOPHIL # BLD AUTO: 0.19 10*3/MM3 (ref 0–0.4)
EOSINOPHIL # BLD MANUAL: 0.14 10*3/MM3 (ref 0–0.4)
EOSINOPHIL NFR BLD AUTO: 2.8 % (ref 0.3–6.2)
EOSINOPHIL NFR BLD MANUAL: 2 % (ref 0.3–6.2)
ERYTHROCYTE [DISTWIDTH] IN BLOOD BY AUTOMATED COUNT: 23.7 % (ref 12.3–15.4)
GFR SERPL CREATININE-BSD FRML MDRD: 88 ML/MIN/1.73
GLUCOSE SERPL-MCNC: 113 MG/DL (ref 65–99)
HCT VFR BLD AUTO: 30.5 % (ref 37.5–51)
HGB BLD-MCNC: 10.6 G/DL (ref 13–17.7)
IMM GRANULOCYTES # BLD AUTO: 0.02 10*3/MM3 (ref 0–0.05)
IMM GRANULOCYTES NFR BLD AUTO: 0.3 % (ref 0–0.5)
LYMPHOCYTES # BLD AUTO: 2.69 10*3/MM3 (ref 0.7–3.1)
LYMPHOCYTES # BLD MANUAL: 3.63 10*3/MM3 (ref 0.7–3.1)
LYMPHOCYTES NFR BLD AUTO: 39.3 % (ref 19.6–45.3)
LYMPHOCYTES NFR BLD MANUAL: 53 % (ref 19.6–45.3)
LYMPHOCYTES NFR BLD MANUAL: 6 % (ref 5–12)
MCH RBC QN AUTO: 40.3 PG (ref 26.6–33)
MCHC RBC AUTO-ENTMCNC: 34.8 G/DL (ref 31.5–35.7)
MCV RBC AUTO: 116 FL (ref 79–97)
MONOCYTES # BLD AUTO: 0.41 10*3/MM3 (ref 0.1–0.9)
MONOCYTES # BLD AUTO: 0.44 10*3/MM3 (ref 0.1–0.9)
MONOCYTES NFR BLD AUTO: 6.4 % (ref 5–12)
NEUTROPHILS # BLD AUTO: 2.6 10*3/MM3 (ref 1.7–7)
NEUTROPHILS NFR BLD AUTO: 3.41 10*3/MM3 (ref 1.7–7)
NEUTROPHILS NFR BLD AUTO: 49.7 % (ref 42.7–76)
NEUTROPHILS NFR BLD MANUAL: 38 % (ref 42.7–76)
NRBC BLD AUTO-RTO: 1.5 /100 WBC (ref 0–0.2)
PLATELET # BLD AUTO: 660 10*3/MM3 (ref 140–450)
PMV BLD AUTO: 10.3 FL (ref 6–12)
POTASSIUM SERPL-SCNC: 4.3 MMOL/L (ref 3.5–5.2)
RBC # BLD AUTO: 2.63 10*6/MM3 (ref 4.14–5.8)
RBC MORPH BLD: NORMAL
SMALL PLATELETS BLD QL SMEAR: ABNORMAL
SODIUM SERPL-SCNC: 135 MMOL/L (ref 136–145)
WBC # BLD AUTO: 6.85 10*3/MM3 (ref 3.4–10.8)
WBC MORPH BLD: NORMAL

## 2021-03-04 PROCEDURE — 80048 BASIC METABOLIC PNL TOTAL CA: CPT

## 2021-03-04 PROCEDURE — 36415 COLL VENOUS BLD VENIPUNCTURE: CPT | Performed by: INTERNAL MEDICINE

## 2021-03-04 PROCEDURE — 85025 COMPLETE CBC W/AUTO DIFF WBC: CPT

## 2021-03-04 PROCEDURE — 85007 BL SMEAR W/DIFF WBC COUNT: CPT

## 2021-03-05 ENCOUNTER — TELEMEDICINE (OUTPATIENT)
Dept: ONCOLOGY | Facility: CLINIC | Age: 66
End: 2021-03-05

## 2021-03-05 DIAGNOSIS — D75.839 THROMBOCYTOSIS: ICD-10-CM

## 2021-03-05 DIAGNOSIS — Z51.81 ENCOUNTER FOR MONITORING OF HYDROXYUREA THERAPY: Chronic | ICD-10-CM

## 2021-03-05 DIAGNOSIS — D47.1 MPN (MYELOPROLIFERATIVE NEOPLASM) (HCC): Primary | ICD-10-CM

## 2021-03-05 DIAGNOSIS — D46.9 MDS/MPN (MYELODYSPLASTIC/MYELOPROLIFERATIVE NEOPLASMS) (HCC): ICD-10-CM

## 2021-03-05 DIAGNOSIS — D64.9 ANEMIA, UNSPECIFIED TYPE: ICD-10-CM

## 2021-03-05 DIAGNOSIS — Z79.64 ENCOUNTER FOR MONITORING OF HYDROXYUREA THERAPY: Chronic | ICD-10-CM

## 2021-03-05 PROCEDURE — 99214 OFFICE O/P EST MOD 30 MIN: CPT | Performed by: INTERNAL MEDICINE

## 2021-03-05 NOTE — PROGRESS NOTES
DATE OF VISIT: 3/5/2021    You have chosen to receive care through a telehealth visit.  Do you consent to use a video/audio connection for your medical care today? Yes       REASON FOR VISIT: MPN/MDS overlap syndrome, anemia, thrombocytosis      HISTORY OF PRESENT ILLNESS:    65-year-old male with medical problem consisting of hypertension, recurrent sinus infection, MPN/MDS overlap syndrome for which patient is currently on hydroxyurea 1500 mg p.o. on Monday and Wednesday and rest of the week taking 1000 mg p.o. daily since February 12, 2021.  Patient is here to discuss recently done blood work and further recommendation.  Complains of fatigue.  Denies any bleeding.  Denies any new lymph node enlargement.  Denies any ankle ulceration.        Oncology history:    1.  MPN/MDS overlap syndrome:  -Bone marrow biopsy done at [facility] on March 23, 2020 is consistent with MPN/MDS overlap syndrome.  - Patient was on hydroxyurea until September 17, 2020 due to severe anemia with hemoglobin of 7.3 and neutropenia with absolute neutrophil count of 1.45 hydroxyurea has been discontinued.  - CBC done on September 25, 2020 shows white blood cell count is 3.97, hemoglobin is 7.7, platelet count is 432,000.     -Hydroxyurea was discontinued on September 17 2020.  -Hydroxyurea was started back on October 2, 2020 at dose of 500 mg p.o. daily.  - dose of hydroxyurea was increased to 1500 mg p.o. daily on November 20, 2020.  -Due to worsening cytopenia, dose of hydroxyurea was changed to 1500 mg p.o. on Monday Wednesday Friday and rest of the week 1000 mg on January 8, 2021         PAST MEDICAL HISTORY:    Past Medical History:   Diagnosis Date   • Chest pain 7/11/2020    -Likely 2/2 to anxiety.  -SOLOMON therapy -Troponin negative x2. Refusing another lab draw for 3rd troponin. -Cardiac telemetry -CTA coronary showed calcium score 70. Calcified plaque in mid RCA approaching 70% narrowing. Per Dr. Newman's interpretation.   • Hepatitis  B    • Hypertension    • MPN (myeloproliferative neoplasm) (CMS/HCC)    • Sinus infection        SOCIAL HISTORY:    Social History     Tobacco Use   • Smoking status: Former Smoker   • Smokeless tobacco: Never Used   • Tobacco comment: quit in early 21 years old   Substance Use Topics   • Alcohol use: Yes     Alcohol/week: 1.0 standard drinks     Types: 1 Cans of beer per week     Comment: occasionally   • Drug use: Never       Surgical History :  Past Surgical History:   Procedure Laterality Date   • COLONOSCOPY     • HEMORRHOID BANDING         ALLERGIES:    Allergies   Allergen Reactions   • Other Rash     Pt was working with walnut wood and broke out in a rash   • Tetracycline Rash         FAMILY HISTORY:  Family History   Problem Relation Age of Onset   • Diabetes Father    • Cirrhosis Father    • Cancer Sister    • Cancer Maternal Uncle            REVIEW OF SYSTEMS:      CONSTITUTIONAL: Denies any recent worsening of fatigue. Denies any fever, chills or weight loss.     EYES: No visual disturbances. No discharge. No new lesions    ENMT:  No epistaxis, mouth sores or difficulty swallowing.    RESPIRATORY:  No new shortness of breath. No new cough or hemoptysis.    CARDIOVASCULAR:  No chest pain or palpitations.    GASTROINTESTINAL:  No abdominal pain nausea, vomiting or blood in the stool.    GENITOURINARY: No Dysuria or Hematuria.    MUSCULOSKELETAL:  No new back pain or arthralgia..    LYMPHATICS:  Denies any abnormal swollen glands anywhere in the body.    NEUROLOGICAL : No tingling or numbness. No headache or dizziness. No seizures or balance problems.    SKIN: No new skin lesions.    ENDOCRINE : No new hot or cold intolerance. No new polyuria . No polydipsia.        PHYSICAL EXAMINATION:      VITAL SIGNS:  Afebrile(per patient), RR-16      ECOG performance status: 1    CONSTITUTIONAL:  Not in any distress.    EYES:  Extraocular Movements intact.No ptosis.    ENMT:  Normocephalic, Atraumatic.No Facial  Asymmetry noted.    NECK:  No  visible adenopathy.    RESPIRATORY:  Fair respiratory effort.    MUSCULOSKELETAL:  Normal range of motion.    NEUROLOGIC:    No  Motor  deficit appreciated.  Moving all 4 extremities appropriately.    SKIN : No new skin lesion lesions.    LYMPHATICS: No visible enlarged lymph nodes in neck or supraclavicular area.    PSYCHIATRY: Alert, awake and oriented ×3.  Normal affect.  Normal judgment.        DIAGNOSTIC DATA:    Glucose   Date Value Ref Range Status   03/04/2021 113 (H) 65 - 99 mg/dL Final     Sodium   Date Value Ref Range Status   03/04/2021 135 (L) 136 - 145 mmol/L Final     Potassium   Date Value Ref Range Status   03/04/2021 4.3 3.5 - 5.2 mmol/L Final     CO2   Date Value Ref Range Status   03/04/2021 24.0 22.0 - 29.0 mmol/L Final     Chloride   Date Value Ref Range Status   03/04/2021 101 98 - 107 mmol/L Final     Anion Gap   Date Value Ref Range Status   03/04/2021 10.0 5.0 - 15.0 mmol/L Final     Creatinine   Date Value Ref Range Status   03/04/2021 0.87 0.76 - 1.27 mg/dL Final     BUN   Date Value Ref Range Status   03/04/2021 11 8 - 23 mg/dL Final     BUN/Creatinine Ratio   Date Value Ref Range Status   03/04/2021 12.6 7.0 - 25.0 Final     Calcium   Date Value Ref Range Status   03/04/2021 9.7 8.6 - 10.5 mg/dL Final     eGFR Non  Amer   Date Value Ref Range Status   03/04/2021 88 >60 mL/min/1.73 Final     Alkaline Phosphatase   Date Value Ref Range Status   12/17/2020 47 39 - 117 U/L Final     Total Protein   Date Value Ref Range Status   12/17/2020 6.5 6.0 - 8.5 g/dL Final     ALT (SGPT)   Date Value Ref Range Status   12/17/2020 23 1 - 41 U/L Final     AST (SGOT)   Date Value Ref Range Status   12/17/2020 19 1 - 40 U/L Final     Total Bilirubin   Date Value Ref Range Status   12/17/2020 0.7 0.0 - 1.2 mg/dL Final     Albumin   Date Value Ref Range Status   12/17/2020 4.30 3.50 - 5.20 g/dL Final     Globulin   Date Value Ref Range Status   12/17/2020 2.2 gm/dL  Final     Lab Results   Component Value Date    WBC 6.85 03/04/2021    HGB 10.6 (L) 03/04/2021    HCT 30.5 (L) 03/04/2021    .0 (H) 03/04/2021     (H) 03/04/2021     Lab Results   Component Value Date    NEUTROABS 3.41 03/04/2021    NEUTROABS 2.60 03/04/2021    IRON 109 05/28/2020    TIBC 301 05/28/2020    LABIRON 36 05/28/2020    FERRITIN 687.00 (H) 05/28/2020    [id number] >2,000 (H) 05/28/2020    FOLATE 11.70 05/28/2020     No results found for: , LABCA2, AFPTM, HCGQUANT, , CHROMGRNA, [id number], CEA, REFLABREPO        PATHOLOGY:  * Cannot find OR log *         RADIOLOGY DATA :  No radiology results for the last 7 days      ASSESSMENT AND PLAN:      1.  MPN/MDS overlap syndrome:  -Please review oncology history for prior treatment details  -Patient is currently on hydroxyurea 1500 mg p.o. on Monday and Wednesday, rest of the week patient is taking 1000 mg p.o. daily  -CBC done on March 4, 2021 shows hemoglobin is 10.6, platelet count is elevated at 160,000.  Due to anemia, would not recommend increasing dose of hydroxyurea.  Recommend continue with same dose of hydroxyurea for now  -We will recheck CBC in 3 weeks.  -We will ask patient to return to clinic in 6 weeks with repeat CBC and CMP to be done prior to that.    2.  Anemia and thrombocytosis:  -Secondary to MDS/MPN overlap syndrome plus hydroxyurea  -We will continue with hydroxyurea for now.    3.  Health maintenance: Patient does not smoke    4. Advance Care Planning: For now patient remains full code and is able to make decisions.  Patient has health care surrogate mentioned on chart.      PHQ-9 Total Score:       Marv Messina reports a pain score of 0.  Given his pain assessment as noted, treatment options were discussed and the following options were decided upon as a follow-up plan to address the patient's pain: No acute intervention required.               This visit was completed as a video visit due to ongoing  pandemic with coronavirus.    I did  complete this video visit with the patient using [employer].       Vadim Song MD  3/5/2021  09:32 CST          Part of this note may be an electronic transcription/translation of spoken language to printed text using the Dragon Dictation System.

## 2021-03-17 DIAGNOSIS — Z01.89 PATIENT REQUEST FOR DIAGNOSTIC TESTING: Primary | ICD-10-CM

## 2021-03-23 ENCOUNTER — TELEPHONE (OUTPATIENT)
Dept: ONCOLOGY | Facility: CLINIC | Age: 66
End: 2021-03-23

## 2021-03-25 ENCOUNTER — APPOINTMENT (OUTPATIENT)
Dept: VACCINE CLINIC | Facility: HOSPITAL | Age: 66
End: 2021-03-25

## 2021-03-26 ENCOUNTER — LAB (OUTPATIENT)
Dept: ONCOLOGY | Facility: HOSPITAL | Age: 66
End: 2021-03-26

## 2021-03-26 DIAGNOSIS — Z01.84 IMMUNITY STATUS TESTING: Primary | ICD-10-CM

## 2021-03-26 DIAGNOSIS — Z01.89 PATIENT REQUEST FOR DIAGNOSTIC TESTING: ICD-10-CM

## 2021-03-26 DIAGNOSIS — Z01.84 IMMUNITY STATUS TESTING: ICD-10-CM

## 2021-03-26 DIAGNOSIS — D46.9 MDS/MPN (MYELODYSPLASTIC/MYELOPROLIFERATIVE NEOPLASMS) (HCC): ICD-10-CM

## 2021-03-26 LAB
ABO GROUP BLD: NORMAL
ANISOCYTOSIS BLD QL: ABNORMAL
BASOPHILS # BLD AUTO: 0.08 10*3/MM3 (ref 0–0.2)
BASOPHILS NFR BLD AUTO: 1.2 % (ref 0–1.5)
BLD GP AB SCN SERPL QL: NEGATIVE
DEPRECATED RDW RBC AUTO: 96.1 FL (ref 37–54)
EOSINOPHIL # BLD AUTO: 0.23 10*3/MM3 (ref 0–0.4)
EOSINOPHIL # BLD MANUAL: 0.26 10*3/MM3 (ref 0–0.4)
EOSINOPHIL NFR BLD AUTO: 3.5 % (ref 0.3–6.2)
EOSINOPHIL NFR BLD MANUAL: 4 % (ref 0.3–6.2)
ERYTHROCYTE [DISTWIDTH] IN BLOOD BY AUTOMATED COUNT: 22.4 % (ref 12.3–15.4)
HCT VFR BLD AUTO: 32.3 % (ref 37.5–51)
HGB BLD-MCNC: 10.9 G/DL (ref 13–17.7)
IMM GRANULOCYTES # BLD AUTO: 0.03 10*3/MM3 (ref 0–0.05)
IMM GRANULOCYTES NFR BLD AUTO: 0.5 % (ref 0–0.5)
LYMPHOCYTES # BLD AUTO: 2.63 10*3/MM3 (ref 0.7–3.1)
LYMPHOCYTES # BLD MANUAL: 2.62 10*3/MM3 (ref 0.7–3.1)
LYMPHOCYTES NFR BLD AUTO: 40.2 % (ref 19.6–45.3)
LYMPHOCYTES NFR BLD MANUAL: 4 % (ref 5–12)
LYMPHOCYTES NFR BLD MANUAL: 40 % (ref 19.6–45.3)
Lab: NORMAL
MACROCYTES BLD QL SMEAR: ABNORMAL
MCH RBC QN AUTO: 40.1 PG (ref 26.6–33)
MCHC RBC AUTO-ENTMCNC: 33.7 G/DL (ref 31.5–35.7)
MCV RBC AUTO: 118.8 FL (ref 79–97)
MONOCYTES # BLD AUTO: 0.26 10*3/MM3 (ref 0.1–0.9)
MONOCYTES # BLD AUTO: 0.36 10*3/MM3 (ref 0.1–0.9)
MONOCYTES NFR BLD AUTO: 5.5 % (ref 5–12)
NEUTROPHILS # BLD AUTO: 3.34 10*3/MM3 (ref 1.7–7)
NEUTROPHILS NFR BLD AUTO: 3.21 10*3/MM3 (ref 1.7–7)
NEUTROPHILS NFR BLD AUTO: 49.1 % (ref 42.7–76)
NEUTROPHILS NFR BLD MANUAL: 51 % (ref 42.7–76)
NRBC BLD AUTO-RTO: 1.2 /100 WBC (ref 0–0.2)
NRBC SPEC MANUAL: 1 /100 WBC (ref 0–0.2)
PLATELET # BLD AUTO: 664 10*3/MM3 (ref 140–450)
PMV BLD AUTO: 10.6 FL (ref 6–12)
POLYCHROMASIA BLD QL SMEAR: ABNORMAL
RBC # BLD AUTO: 2.72 10*6/MM3 (ref 4.14–5.8)
RH BLD: NEGATIVE
SMALL PLATELETS BLD QL SMEAR: ABNORMAL
T&S EXPIRATION DATE: NORMAL
VARIANT LYMPHS NFR BLD MANUAL: 1 % (ref 0–5)
WBC # BLD AUTO: 6.54 10*3/MM3 (ref 3.4–10.8)
WBC MORPH BLD: NORMAL

## 2021-03-26 PROCEDURE — 85025 COMPLETE CBC W/AUTO DIFF WBC: CPT

## 2021-03-26 PROCEDURE — 86901 BLOOD TYPING SEROLOGIC RH(D): CPT

## 2021-03-26 PROCEDURE — 86769 SARS-COV-2 COVID-19 ANTIBODY: CPT | Performed by: NURSE PRACTITIONER

## 2021-03-26 PROCEDURE — 85007 BL SMEAR W/DIFF WBC COUNT: CPT

## 2021-03-26 PROCEDURE — 86900 BLOOD TYPING SEROLOGIC ABO: CPT

## 2021-03-26 PROCEDURE — 86850 RBC ANTIBODY SCREEN: CPT

## 2021-03-26 PROCEDURE — 36415 COLL VENOUS BLD VENIPUNCTURE: CPT | Performed by: INTERNAL MEDICINE

## 2021-03-27 LAB — SARS-COV-2 IGG SERPL QL IA: NEGATIVE

## 2021-03-29 ENCOUNTER — TELEPHONE (OUTPATIENT)
Dept: ONCOLOGY | Facility: HOSPITAL | Age: 66
End: 2021-03-29

## 2021-03-29 ENCOUNTER — TELEPHONE (OUTPATIENT)
Dept: ONCOLOGY | Facility: CLINIC | Age: 66
End: 2021-03-29

## 2021-03-29 RX ORDER — HYDROXYUREA 500 MG/1
CAPSULE ORAL
Qty: 90 CAPSULE | Refills: 1 | Status: SHIPPED | OUTPATIENT
Start: 2021-03-29 | End: 2021-04-19 | Stop reason: SDUPTHER

## 2021-03-29 NOTE — TELEPHONE ENCOUNTER
----- Message from Vadim Song MD sent at 3/29/2021  9:51 AM CDT -----  Please let patient know, hemoglobin is 10.9, platelet count is 664,000.  Recommend continuing same dose of hydroxyurea that would be 1500 mg on Monday and Wednesday, rest of the week take 1000 mcg p.o. daily.  Also let him know his blood group is O negative.  Thank you

## 2021-03-30 ENCOUNTER — DOCUMENTATION (OUTPATIENT)
Dept: ONCOLOGY | Facility: HOSPITAL | Age: 66
End: 2021-03-30

## 2021-03-30 ENCOUNTER — TELEPHONE (OUTPATIENT)
Dept: ONCOLOGY | Facility: HOSPITAL | Age: 66
End: 2021-03-30

## 2021-03-30 NOTE — TELEPHONE ENCOUNTER
Called and spoke with pt regarding lab results and instructions for taking medications per Dr. Song.  V/u obtained.

## 2021-04-07 ENCOUNTER — OFFICE VISIT (OUTPATIENT)
Dept: CARDIOLOGY | Facility: CLINIC | Age: 66
End: 2021-04-07

## 2021-04-07 VITALS
HEIGHT: 72 IN | SYSTOLIC BLOOD PRESSURE: 146 MMHG | DIASTOLIC BLOOD PRESSURE: 80 MMHG | HEART RATE: 88 BPM | WEIGHT: 215 LBS | BODY MASS INDEX: 29.12 KG/M2 | OXYGEN SATURATION: 98 %

## 2021-04-07 DIAGNOSIS — I25.10 ATHEROSCLEROSIS OF NATIVE CORONARY ARTERY OF NATIVE HEART WITHOUT ANGINA PECTORIS: ICD-10-CM

## 2021-04-07 DIAGNOSIS — R00.2 PALPITATIONS: Primary | ICD-10-CM

## 2021-04-07 DIAGNOSIS — R07.2 PRECORDIAL PAIN: ICD-10-CM

## 2021-04-07 PROCEDURE — 99213 OFFICE O/P EST LOW 20 MIN: CPT | Performed by: INTERNAL MEDICINE

## 2021-04-07 NOTE — PROGRESS NOTES
Marv Messina  65 y.o. male      1. Palpitations    2. Precordial pain    3. Atherosclerosis of native coronary artery of native heart without angina pectoris        History of Present Illness  Marv Messina is a 65-year-old male with history of myeloproliferative neoplasm, thrombocytosis, hypertension, anxiety, who was admitted to [facility] with chest pain and palpitation in July 2020.  Patient however denies any chest pain during that admission though his hospital records do mention this.  Cardiac enzymes were negative for myocardial injury.  EKG showed no acute ST-T wave changes.  Subsequent work-up included a CT angiogram of the coronary arteries which showed 63% and a calcium score of 70.  There was moderate disease in the right coronary artery with 1 focal area approaching 70%.  Minimal plaques were noted in the LAD and circumflex was normal.  Echocardiogram in July 2020 showed normal LV systolic function with an EF of 61% with mild LVH.  There was grade 1 diastolic dysfunction.    The patient has done well from a cardiac standpoint and denied any chest pain, palpitation, shortness of breath.  He has been physically active without any restrictions.  Clinical exam today was unremarkable.  His blood pressure was borderline elevated but indicated that it stays in the normal range when he checks it at home.  His LDL was 90 when checked in September 2020.    Allergies   Allergen Reactions   • Other Rash     Pt was working with walnut wood and broke out in a rash   • Tetracycline Rash         Past Medical History:   Diagnosis Date   • Chest pain 7/11/2020    -Likely 2/2 to anxiety.  -SOLOMON therapy -Troponin negative x2. Refusing another lab draw for 3rd troponin. -Cardiac telemetry -CTA coronary showed calcium score 70. Calcified plaque in mid RCA approaching 70% narrowing. Per Dr. Newman's interpretation.   • Hepatitis B    • Hypertension    • MPN (myeloproliferative neoplasm) (CMS/HCC)   "  • Sinus infection          Past Surgical History:   Procedure Laterality Date   • COLONOSCOPY     • HEMORRHOID BANDING           Family History   Problem Relation Age of Onset   • Diabetes Father    • Cirrhosis Father    • Cancer Sister    • Cancer Maternal Uncle          Social History     Socioeconomic History   • Marital status:      Spouse name: Not on file   • Number of children: Not on file   • Years of education: Not on file   • Highest education level: Not on file   Tobacco Use   • Smoking status: Former Smoker   • Smokeless tobacco: Never Used   • Tobacco comment: quit in early 21 years old   Substance and Sexual Activity   • Alcohol use: Yes     Alcohol/week: 1.0 standard drinks     Types: 1 Cans of beer per week     Comment: occasionally   • Drug use: Never   • Sexual activity: Defer         Current Outpatient Medications   Medication Sig Dispense Refill   • aspirin (aspirin) 81 MG EC tablet Take 1 tablet by mouth Daily. 30 tablet 0   • BLACK ELDERBERRY PO Take  by mouth. 1 tablespoon daily     • Cyanocobalamin (VITAMIN B 12 PO) Take 1 tablet by mouth Daily.     • docusate sodium (COLACE) 100 MG capsule Take 100 mg by mouth 2 (Two) Times a Day As Needed.     • folic acid (FOLVITE) 1 MG tablet 1 tablet by mouth on Monday, Wednesday and Friday 36 tablet 1   • hydroxyurea (HYDREA) 500 MG capsule Take 3 capsule  daily 90 capsule 1   • metoprolol succinate XL (TOPROL-XL) 25 MG 24 hr tablet Take 1 tablet by mouth Daily. 90 tablet 3   • NON FORMULARY Daily. kiolic garlic     • VITAMIN C, CALCIUM ASCORBATE, PO Take  by mouth. 2 g of powder daily     • hydrOXYzine pamoate (Vistaril) 25 MG capsule Take 1 capsule by mouth 3 (Three) Times a Day As Needed for Anxiety. 30 capsule 0   • sertraline (ZOLOFT) 25 MG tablet Take 1 tablet by mouth Daily. 30 tablet 0     No current facility-administered medications for this visit.         OBJECTIVE    Pulse 88   Ht 182.9 cm (72\")   Wt 97.5 kg (215 lb)   SpO2 98%   " BMI 29.16 kg/m²         Review of Systems: The following systems are reviewed and no changes noted    Constitutional:  Denies recent weight loss, weight gain, fever or chills, no change in exercise tolerance     HENT:  Denies any hearing loss, epistaxis, hoarseness, or difficulty speaking.     Eyes: Wears eyeglasses or contact lenses     Respiratory:  Denies dyspnea with exertion,no cough, wheezing, or hemoptysis.     Cardiovascular: Negative for palpations, chest pain, orthopnea, PND, peripheral edema, syncope, or claudication.     Gastrointestinal:  Denies change in bowel habits, dyspepsia, ulcer disease, hematochezia, or melena. ? hepatitis B, positive antibodies for hepatitis C    Endocrine: Negative for cold intolerance, heat intolerance, polydipsia, polyphagia and polyuria. Denies any history of weight change, heat/cold intolerance, polydipsia, polyuria     Genitourinary: Negative.      Musculoskeletal: Denies any history of arthritic symptoms or back problems     Neurological: History of myeloproliferative syndrome.  Anemia, thrombocytosis.    Hematological: Denies any food allergies, seasonal allergies, bleeding disorders, or lymphadenopathy.     Psychiatric/Behavioral: Denies any history of depression, substance abuse, or change in cognitive function.         Physical Exam: The following systems were reassessed and no changes noted    Constitutional: Cooperative, alert and oriented,  in no acute distress.     HENT:   Head: Normocephalic, normal hair patterns, no masses or tenderness.  Ears, Nose, and Throat: No gross abnormalities. No pallor or cyanosis. Dentition good.   Eyes: EOMS intact, PERRL, conjunctivae and lids unremarkable. Fundoscopic exam and visual fields not performed.   Neck: No palpable masses or adenopathy, no thyromegaly, no JVD, carotid pulses are full and equal bilaterally and without  Bruits.     Cardiovascular: Regular rhythm, S1 and S2 normal, no S3 or S4.  No murmurs, gallops, or rubs  detected.     Pulmonary/Chest: Chest: normal symmetry, normal respiratory excursion, no intercostal retraction, no use of accessory muscles.            Pulmonary: Normal breath sounds. No rales or ronchi.    Abdominal: Abdomen soft, bowel sounds normoactive, no masses, no hepatosplenomegaly, non-tender, no bruits.     Musculoskeletal: No deformities, clubbing, cyanosis, erythema, or edema observed.     Neurological: No gross motor or sensory deficits noted, affect appropriate, oriented to time, person, place.       Procedures      Lab Results   Component Value Date    WBC 6.54 03/26/2021    HGB 10.9 (L) 03/26/2021    HCT 32.3 (L) 03/26/2021    .8 (H) 03/26/2021     (H) 03/26/2021     Lab Results   Component Value Date    GLUCOSE 113 (H) 03/04/2021    BUN 11 03/04/2021    CREATININE 0.87 03/04/2021    EGFRIFNONA 88 03/04/2021    BCR 12.6 03/04/2021    CO2 24.0 03/04/2021    CALCIUM 9.7 03/04/2021    ALBUMIN 4.30 12/17/2020    AST 19 12/17/2020    ALT 23 12/17/2020     Lab Results   Component Value Date    CHOL 144 09/01/2020     Lab Results   Component Value Date    TRIG 74 09/01/2020     Lab Results   Component Value Date    HDL 39 (L) 09/01/2020     No components found for: LDLCALC  Lab Results   Component Value Date    LDL 90 09/01/2020     No results found for: HDLLDLRATIO  No components found for: CHOLHDL  No results found for: HGBA1C  Lab Results   Component Value Date    TSH 1.250 07/11/2020           ASSESSMENT AND PLAN  Marv Messina has multiple medical issues as discussed in the history of present illness including myeloproliferative neoplasm, leukocytosis, hypertension but is stable from a cardiac standpoint with no clinical evidence of angina, arrhythmia or congestive heart failure.  On his previous visit I had prescribed low-dose metoprolol succinate 25 mg daily but the patient never started the medication.  His reluctant to consider any new medicines.  I did suggest low-dose statins which  could help plaque progression even though his LDL is in the normal range.  However he wants to think about it.    He will continue follow-up with Dr. Donovan and will be glad to see him on a as needed basis    Diagnoses and all orders for this visit:    1. Palpitations (Primary)    2. Precordial pain    3. Atherosclerosis of native coronary artery of native heart without angina pectoris        Patient's Body mass index is 29.16 kg/m². BMI is above normal parameters. Recommendations include: exercise counseling and nutrition counseling.      Marv Messina  reports that he has quit smoking. He has never used smokeless tobacco..    Christie Meyer MD  4/7/2021  08:57 CDT

## 2021-04-15 ENCOUNTER — APPOINTMENT (OUTPATIENT)
Dept: VACCINE CLINIC | Facility: HOSPITAL | Age: 66
End: 2021-04-15

## 2021-04-15 ENCOUNTER — LAB (OUTPATIENT)
Dept: ONCOLOGY | Facility: HOSPITAL | Age: 66
End: 2021-04-15

## 2021-04-15 DIAGNOSIS — D46.9 MDS/MPN (MYELODYSPLASTIC/MYELOPROLIFERATIVE NEOPLASMS) (HCC): ICD-10-CM

## 2021-04-15 LAB
ALBUMIN SERPL-MCNC: 4.6 G/DL (ref 3.5–5.2)
ALBUMIN/GLOB SERPL: 1.7 G/DL
ALP SERPL-CCNC: 56 U/L (ref 39–117)
ALT SERPL W P-5'-P-CCNC: 27 U/L (ref 1–41)
ANION GAP SERPL CALCULATED.3IONS-SCNC: 10 MMOL/L (ref 5–15)
ANISOCYTOSIS BLD QL: ABNORMAL
AST SERPL-CCNC: 25 U/L (ref 1–40)
BASOPHILS # BLD AUTO: 0.09 10*3/MM3 (ref 0–0.2)
BASOPHILS # BLD MANUAL: 0.07 10*3/MM3 (ref 0–0.2)
BASOPHILS NFR BLD AUTO: 1 % (ref 0–1.5)
BASOPHILS NFR BLD AUTO: 1.3 % (ref 0–1.5)
BILIRUB SERPL-MCNC: 1 MG/DL (ref 0–1.2)
BUN SERPL-MCNC: 14 MG/DL (ref 8–23)
BUN/CREAT SERPL: 16.1 (ref 7–25)
CALCIUM SPEC-SCNC: 9.8 MG/DL (ref 8.6–10.5)
CHLORIDE SERPL-SCNC: 103 MMOL/L (ref 98–107)
CO2 SERPL-SCNC: 25 MMOL/L (ref 22–29)
CREAT SERPL-MCNC: 0.87 MG/DL (ref 0.76–1.27)
DEPRECATED RDW RBC AUTO: 88.6 FL (ref 37–54)
EOSINOPHIL # BLD AUTO: 0.17 10*3/MM3 (ref 0–0.4)
EOSINOPHIL # BLD MANUAL: 0.2 10*3/MM3 (ref 0–0.4)
EOSINOPHIL NFR BLD AUTO: 2.5 % (ref 0.3–6.2)
EOSINOPHIL NFR BLD MANUAL: 3 % (ref 0.3–6.2)
ERYTHROCYTE [DISTWIDTH] IN BLOOD BY AUTOMATED COUNT: 21.8 % (ref 12.3–15.4)
GFR SERPL CREATININE-BSD FRML MDRD: 88 ML/MIN/1.73
GLOBULIN UR ELPH-MCNC: 2.7 GM/DL
GLUCOSE SERPL-MCNC: 101 MG/DL (ref 65–99)
HCT VFR BLD AUTO: 31.8 % (ref 37.5–51)
HGB BLD-MCNC: 11 G/DL (ref 13–17.7)
HOLD SPECIMEN: NORMAL
IMM GRANULOCYTES # BLD AUTO: 0.02 10*3/MM3 (ref 0–0.05)
IMM GRANULOCYTES NFR BLD AUTO: 0.3 % (ref 0–0.5)
LYMPHOCYTES # BLD AUTO: 2.36 10*3/MM3 (ref 0.7–3.1)
LYMPHOCYTES # BLD MANUAL: 2.42 10*3/MM3 (ref 0.7–3.1)
LYMPHOCYTES NFR BLD AUTO: 35.2 % (ref 19.6–45.3)
LYMPHOCYTES NFR BLD MANUAL: 33 % (ref 19.6–45.3)
LYMPHOCYTES NFR BLD MANUAL: 8 % (ref 5–12)
MACROCYTES BLD QL SMEAR: ABNORMAL
MCH RBC QN AUTO: 39.9 PG (ref 26.6–33)
MCHC RBC AUTO-ENTMCNC: 34.6 G/DL (ref 31.5–35.7)
MCV RBC AUTO: 115.2 FL (ref 79–97)
METAMYELOCYTES NFR BLD MANUAL: 1 % (ref 0–0)
MONOCYTES # BLD AUTO: 0.43 10*3/MM3 (ref 0.1–0.9)
MONOCYTES # BLD AUTO: 0.54 10*3/MM3 (ref 0.1–0.9)
MONOCYTES NFR BLD AUTO: 6.4 % (ref 5–12)
NEUTROPHILS # BLD AUTO: 3.42 10*3/MM3 (ref 1.7–7)
NEUTROPHILS NFR BLD AUTO: 3.64 10*3/MM3 (ref 1.7–7)
NEUTROPHILS NFR BLD AUTO: 54.3 % (ref 42.7–76)
NEUTROPHILS NFR BLD MANUAL: 51 % (ref 42.7–76)
NRBC BLD AUTO-RTO: 0.9 /100 WBC (ref 0–0.2)
PLATELET # BLD AUTO: 558 10*3/MM3 (ref 140–450)
PMV BLD AUTO: 10.8 FL (ref 6–12)
POLYCHROMASIA BLD QL SMEAR: ABNORMAL
POTASSIUM SERPL-SCNC: 4.3 MMOL/L (ref 3.5–5.2)
PROT SERPL-MCNC: 7.3 G/DL (ref 6–8.5)
RBC # BLD AUTO: 2.76 10*6/MM3 (ref 4.14–5.8)
SMALL PLATELETS BLD QL SMEAR: ABNORMAL
SODIUM SERPL-SCNC: 138 MMOL/L (ref 136–145)
VARIANT LYMPHS NFR BLD MANUAL: 3 % (ref 0–5)
WBC # BLD AUTO: 6.71 10*3/MM3 (ref 3.4–10.8)
WBC MORPH BLD: NORMAL

## 2021-04-15 PROCEDURE — 80053 COMPREHEN METABOLIC PANEL: CPT

## 2021-04-15 PROCEDURE — 85007 BL SMEAR W/DIFF WBC COUNT: CPT

## 2021-04-15 PROCEDURE — 85025 COMPLETE CBC W/AUTO DIFF WBC: CPT

## 2021-04-15 PROCEDURE — 36415 COLL VENOUS BLD VENIPUNCTURE: CPT | Performed by: INTERNAL MEDICINE

## 2021-04-19 ENCOUNTER — TELEMEDICINE (OUTPATIENT)
Dept: ONCOLOGY | Facility: CLINIC | Age: 66
End: 2021-04-19

## 2021-04-19 DIAGNOSIS — D75.839 THROMBOCYTOSIS: Chronic | ICD-10-CM

## 2021-04-19 DIAGNOSIS — D46.9 MDS/MPN (MYELODYSPLASTIC/MYELOPROLIFERATIVE NEOPLASMS) (HCC): Primary | Chronic | ICD-10-CM

## 2021-04-19 DIAGNOSIS — R53.83 OTHER FATIGUE: Chronic | ICD-10-CM

## 2021-04-19 DIAGNOSIS — D64.9 ANEMIA, UNSPECIFIED TYPE: Chronic | ICD-10-CM

## 2021-04-19 PROCEDURE — G9903 PT SCRN TBCO ID AS NON USER: HCPCS | Performed by: INTERNAL MEDICINE

## 2021-04-19 PROCEDURE — 1126F AMNT PAIN NOTED NONE PRSNT: CPT | Performed by: INTERNAL MEDICINE

## 2021-04-19 PROCEDURE — 1123F ACP DISCUSS/DSCN MKR DOCD: CPT | Performed by: INTERNAL MEDICINE

## 2021-04-19 PROCEDURE — 99214 OFFICE O/P EST MOD 30 MIN: CPT | Performed by: INTERNAL MEDICINE

## 2021-04-19 RX ORDER — HYDROXYUREA 500 MG/1
CAPSULE ORAL
Qty: 90 CAPSULE | Refills: 1 | Status: SHIPPED | OUTPATIENT
Start: 2021-04-19 | End: 2021-06-02 | Stop reason: SDUPTHER

## 2021-04-19 NOTE — PROGRESS NOTES
DATE OF VISIT: 4/19/2021    You have chosen to receive care through a telehealth visit.  Do you consent to use a video/audio connection for your medical care today? Yes       REASON FOR VISIT: MPN/MDS overlap syndrome, anemia, thrombocytosis      HISTORY OF PRESENT ILLNESS:    65-year-old male with medical problem consisting of hypertension, recurrent sinus infection, MPN/MDS overlap syndrome for which patient is currently on hydroxyurea 1500 mg p.o. on Monday and Wednesday, rest of the week taking 1000 mg p.o. daily since February 20, 2021.  Patient is here to discuss the result of recently done blood work and further recommendation.  Denies any recent worsening of fatigue.  Denies any bleeding.  Denies any new lymph node enlargement.  Denies any ankle ulceration or mouth sores.          Oncology history:    1.  MPN/MDS overlap syndrome:  -Bone marrow biopsy done at [facility] on March 23, 2020 is consistent with MPN/MDS overlap syndrome.  - Patient was on hydroxyurea until September 17, 2020 due to severe anemia with hemoglobin of 7.3 and neutropenia with absolute neutrophil count of 1.45 hydroxyurea has been discontinued.  - CBC done on September 25, 2020 shows white blood cell count is 3.97, hemoglobin is 7.7, platelet count is 432,000.     -Hydroxyurea was discontinued on September 17 2020.  -Hydroxyurea was started back on October 2, 2020 at dose of 500 mg p.o. daily.  - dose of hydroxyurea was increased to 1500 mg p.o. daily on November 20, 2020.  -Due to worsening cytopenia, dose of hydroxyurea was changed to 1500 mg p.o. on Monday Wednesday Friday and rest of the week 1000 mg on January 8, 2021            PAST MEDICAL HISTORY:    Past Medical History:   Diagnosis Date   • Chest pain 7/11/2020    -Likely 2/2 to anxiety.  -SOLOMON therapy -Troponin negative x2. Refusing another lab draw for 3rd troponin. -Cardiac telemetry -CTA coronary showed calcium score 70. Calcified plaque in mid RCA approaching 70%  narrowing. Per Dr. Newman's interpretation.   • Hepatitis B    • Hypertension    • MPN (myeloproliferative neoplasm) (CMS/HCC)    • Sinus infection        SOCIAL HISTORY:    Social History     Tobacco Use   • Smoking status: Former Smoker   • Smokeless tobacco: Never Used   • Tobacco comment: quit in early 21 years old   Substance Use Topics   • Alcohol use: Yes     Alcohol/week: 1.0 standard drinks     Types: 1 Cans of beer per week     Comment: occasionally   • Drug use: Never       Surgical History :  Past Surgical History:   Procedure Laterality Date   • COLONOSCOPY     • HEMORRHOID BANDING         ALLERGIES:    Allergies   Allergen Reactions   • Other Rash     Pt was working with walnut wood and broke out in a rash   • Tetracycline Rash         FAMILY HISTORY:  Family History   Problem Relation Age of Onset   • Diabetes Father    • Cirrhosis Father    • Cancer Sister    • Cancer Maternal Uncle            REVIEW OF SYSTEMS:      CONSTITUTIONAL:  Complains of fatigue. Denies any fever, chills or weight loss.     EYES: No visual disturbances. No discharge. No new lesions    ENMT:  No epistaxis, mouth sores or difficulty swallowing.    RESPIRATORY:  No new shortness of breath. No new cough or hemoptysis.    CARDIOVASCULAR:  No chest pain or palpitations.    GASTROINTESTINAL:  No abdominal pain nausea, vomiting or blood in the stool.    GENITOURINARY: No Dysuria or Hematuria.    MUSCULOSKELETAL:  No new back pain or arthralgia..    LYMPHATICS:  Denies any abnormal swollen glands anywhere in the body.    NEUROLOGICAL : No tingling or numbness. No headache or dizziness. No seizures or balance problems.    SKIN: No new skin lesions.    ENDOCRINE : No new hot or cold intolerance. No new polyuria . No polydipsia.        PHYSICAL EXAMINATION:      VITAL SIGNS:  Afebrile(per patient), RR-16      ECOG performance status: 1    CONSTITUTIONAL:  Not in any distress.    EYES:  Extraocular Movements intact.No ptosis.    ENMT:   Normocephalic, Atraumatic.No Facial Asymmetry noted.    NECK:  No  visible adenopathy.    RESPIRATORY:  Fair respiratory effort.    MUSCULOSKELETAL:  Normal range of motion.    NEUROLOGIC:    No  Motor  deficit appreciated.  Moving all 4 extremities appropriately.    SKIN : No new skin lesion lesions.    LYMPHATICS: No visible enlarged lymph nodes in neck or supraclavicular area.    PSYCHIATRY: Alert, awake and oriented ×3.  Normal affect.  Normal judgment.        DIAGNOSTIC DATA:    Glucose   Date Value Ref Range Status   04/15/2021 101 (H) 65 - 99 mg/dL Final     Sodium   Date Value Ref Range Status   04/15/2021 138 136 - 145 mmol/L Final     Potassium   Date Value Ref Range Status   04/15/2021 4.3 3.5 - 5.2 mmol/L Final     CO2   Date Value Ref Range Status   04/15/2021 25.0 22.0 - 29.0 mmol/L Final     Chloride   Date Value Ref Range Status   04/15/2021 103 98 - 107 mmol/L Final     Anion Gap   Date Value Ref Range Status   04/15/2021 10.0 5.0 - 15.0 mmol/L Final     Creatinine   Date Value Ref Range Status   04/15/2021 0.87 0.76 - 1.27 mg/dL Final     BUN   Date Value Ref Range Status   04/15/2021 14 8 - 23 mg/dL Final     BUN/Creatinine Ratio   Date Value Ref Range Status   04/15/2021 16.1 7.0 - 25.0 Final     Calcium   Date Value Ref Range Status   04/15/2021 9.8 8.6 - 10.5 mg/dL Final     eGFR Non  Amer   Date Value Ref Range Status   04/15/2021 88 >60 mL/min/1.73 Final     Alkaline Phosphatase   Date Value Ref Range Status   04/15/2021 56 39 - 117 U/L Final     Total Protein   Date Value Ref Range Status   04/15/2021 7.3 6.0 - 8.5 g/dL Final     ALT (SGPT)   Date Value Ref Range Status   04/15/2021 27 1 - 41 U/L Final     AST (SGOT)   Date Value Ref Range Status   04/15/2021 25 1 - 40 U/L Final     Total Bilirubin   Date Value Ref Range Status   04/15/2021 1.0 0.0 - 1.2 mg/dL Final     Albumin   Date Value Ref Range Status   04/15/2021 4.60 3.50 - 5.20 g/dL Final     Globulin   Date Value Ref Range  Status   04/15/2021 2.7 gm/dL Final     Lab Results   Component Value Date    WBC 6.71 04/15/2021    HGB 11.0 (L) 04/15/2021    HCT 31.8 (L) 04/15/2021    .2 (H) 04/15/2021     (H) 04/15/2021     Lab Results   Component Value Date    NEUTROABS 3.64 04/15/2021    NEUTROABS 3.42 04/15/2021    IRON 109 05/28/2020    TIBC 301 05/28/2020    LABIRON 36 05/28/2020    FERRITIN 687.00 (H) 05/28/2020    [id number] >2,000 (H) 05/28/2020    FOLATE 11.70 05/28/2020     No results found for: , LABCA2, AFPTM, HCGQUANT, , CHROMGRNA, [id number], CEA, REFLABREPO        PATHOLOGY:  * Cannot find OR log *         RADIOLOGY DATA :  No radiology results for the last 7 days      ASSESSMENT AND PLAN:      1.  MPN/MDS overlap syndrome:  -Please review oncology history for prior treatment details  -Patient is currently on hydroxyurea 1500 mg p.o. on Monday Wednesday and rest of the week is taking hydroxyurea 1000 mg p.o. daily  -CBC done on April 15, 2021 shows hemoglobin is 11, platelet count is 558,000.  White blood cell count is 6.71.  -In the past patient has not been able to tolerate higher than this dose of hydroxyurea due to severe worsening of anemia  -We will continue with same dose of hydroxyurea for now  -We will ask patient to have CBC check in 3 weeks from now  -We will have patient follow-up in clinic in 6 weeks with repeat CBC and CMP to be done prior to that    2.  Anemia and thrombocytosis:  -Secondary to MDS/MPN overlap syndrome plus hydroxyurea  -We will monitor with CBC for now    3.  Health maintenance: Patient does not smoke    4. Advance Care Planning: For now patient remains full code and is able to make decisions.  Patient has health care surrogate mentioned on chart.         5.  Prescriptions:  -Prescription for hydroxyurea has been sent to his pharmacy today.    PHQ-9 Total Score:       Marv Messina reports a pain score of 0.  Given his pain assessment as noted, treatment options  were discussed and the following options were decided upon as a follow-up plan to address the patient's pain: No acute intervention required.               This visit was completed as a video visit due to ongoing pandemic with coronavirus.    I did not complete this video visit with the patient using [employer].       Vadim Song MD  4/19/2021  10:27 CDT          Part of this note may be an electronic transcription/translation of spoken language to printed text using the Dragon Dictation System.

## 2021-05-10 ENCOUNTER — LAB (OUTPATIENT)
Dept: ONCOLOGY | Facility: HOSPITAL | Age: 66
End: 2021-05-10

## 2021-05-10 DIAGNOSIS — D75.839 THROMBOCYTOSIS: ICD-10-CM

## 2021-05-10 DIAGNOSIS — R53.83 OTHER FATIGUE: ICD-10-CM

## 2021-05-10 DIAGNOSIS — D46.9 MDS/MPN (MYELODYSPLASTIC/MYELOPROLIFERATIVE NEOPLASMS) (HCC): ICD-10-CM

## 2021-05-10 DIAGNOSIS — D64.9 ANEMIA, UNSPECIFIED TYPE: ICD-10-CM

## 2021-05-10 LAB
ALBUMIN SERPL-MCNC: 4.6 G/DL (ref 3.5–5.2)
ALBUMIN/GLOB SERPL: 2 G/DL
ALP SERPL-CCNC: 60 U/L (ref 39–117)
ALT SERPL W P-5'-P-CCNC: 28 U/L (ref 1–41)
ANION GAP SERPL CALCULATED.3IONS-SCNC: 8 MMOL/L (ref 5–15)
ANISOCYTOSIS BLD QL: NORMAL
AST SERPL-CCNC: 24 U/L (ref 1–40)
BILIRUB SERPL-MCNC: 0.9 MG/DL (ref 0–1.2)
BUN SERPL-MCNC: 13 MG/DL (ref 8–23)
BUN/CREAT SERPL: 14.1 (ref 7–25)
CALCIUM SPEC-SCNC: 9.5 MG/DL (ref 8.6–10.5)
CHLORIDE SERPL-SCNC: 104 MMOL/L (ref 98–107)
CO2 SERPL-SCNC: 27 MMOL/L (ref 22–29)
CREAT SERPL-MCNC: 0.92 MG/DL (ref 0.76–1.27)
DEPRECATED RDW RBC AUTO: 93.6 FL (ref 37–54)
ERYTHROCYTE [DISTWIDTH] IN BLOOD BY AUTOMATED COUNT: 22.1 % (ref 12.3–15.4)
GFR SERPL CREATININE-BSD FRML MDRD: 83 ML/MIN/1.73
GLOBULIN UR ELPH-MCNC: 2.3 GM/DL
GLUCOSE SERPL-MCNC: 119 MG/DL (ref 65–99)
HCT VFR BLD AUTO: 31 % (ref 37.5–51)
HGB BLD-MCNC: 10.5 G/DL (ref 13–17.7)
HOLD SPECIMEN: NORMAL
LYMPHOCYTES # BLD MANUAL: 2.59 10*3/MM3 (ref 0.7–3.1)
LYMPHOCYTES NFR BLD MANUAL: 45 % (ref 19.6–45.3)
LYMPHOCYTES NFR BLD MANUAL: 6 % (ref 5–12)
MACROCYTES BLD QL SMEAR: NORMAL
MCH RBC QN AUTO: 40.1 PG (ref 26.6–33)
MCHC RBC AUTO-ENTMCNC: 33.9 G/DL (ref 31.5–35.7)
MCV RBC AUTO: 118.3 FL (ref 79–97)
MONOCYTES # BLD AUTO: 0.35 10*3/MM3 (ref 0.1–0.9)
NEUTROPHILS # BLD AUTO: 2.82 10*3/MM3 (ref 1.7–7)
NEUTROPHILS NFR BLD MANUAL: 49 % (ref 42.7–76)
PLATELET # BLD AUTO: 595 10*3/MM3 (ref 140–450)
PMV BLD AUTO: 10.8 FL (ref 6–12)
POLYCHROMASIA BLD QL SMEAR: NORMAL
POTASSIUM SERPL-SCNC: 4.3 MMOL/L (ref 3.5–5.2)
PROT SERPL-MCNC: 6.9 G/DL (ref 6–8.5)
RBC # BLD AUTO: 2.62 10*6/MM3 (ref 4.14–5.8)
SMALL PLATELETS BLD QL SMEAR: NORMAL
SODIUM SERPL-SCNC: 139 MMOL/L (ref 136–145)
WBC # BLD AUTO: 5.75 10*3/MM3 (ref 3.4–10.8)
WBC MORPH BLD: NORMAL

## 2021-05-10 PROCEDURE — 85025 COMPLETE CBC W/AUTO DIFF WBC: CPT

## 2021-05-10 PROCEDURE — 80053 COMPREHEN METABOLIC PANEL: CPT

## 2021-05-10 PROCEDURE — 36415 COLL VENOUS BLD VENIPUNCTURE: CPT | Performed by: INTERNAL MEDICINE

## 2021-05-10 PROCEDURE — 85007 BL SMEAR W/DIFF WBC COUNT: CPT

## 2021-05-11 ENCOUNTER — TELEPHONE (OUTPATIENT)
Dept: ONCOLOGY | Facility: HOSPITAL | Age: 66
End: 2021-05-11

## 2021-05-11 NOTE — TELEPHONE ENCOUNTER
----- Message from Vadim Song MD sent at 5/11/2021  6:41 AM CDT -----  Please let patient know, his hemoglobin today is 10.5, platelet count is 595,000, white blood cell count is 5.75.  Recommend continue with same dose of hydroxyurea for now.  Thank you

## 2021-05-12 ENCOUNTER — TELEPHONE (OUTPATIENT)
Dept: ONCOLOGY | Facility: HOSPITAL | Age: 66
End: 2021-05-12

## 2021-05-12 NOTE — TELEPHONE ENCOUNTER
Called and spoke with pt regarding lab results and instructions for medications as ordered by Dr. Song, v/u obtained.

## 2021-05-25 DIAGNOSIS — D46.9 MDS/MPN (MYELODYSPLASTIC/MYELOPROLIFERATIVE NEOPLASMS) (HCC): Primary | ICD-10-CM

## 2021-06-01 ENCOUNTER — LAB (OUTPATIENT)
Dept: ONCOLOGY | Facility: HOSPITAL | Age: 66
End: 2021-06-01

## 2021-06-01 DIAGNOSIS — D46.9 MDS/MPN (MYELODYSPLASTIC/MYELOPROLIFERATIVE NEOPLASMS) (HCC): ICD-10-CM

## 2021-06-01 LAB
ALBUMIN SERPL-MCNC: 4.9 G/DL (ref 3.5–5.2)
ALBUMIN/GLOB SERPL: 2 G/DL
ALP SERPL-CCNC: 58 U/L (ref 39–117)
ALT SERPL W P-5'-P-CCNC: 24 U/L (ref 1–41)
ANION GAP SERPL CALCULATED.3IONS-SCNC: 9 MMOL/L (ref 5–15)
ANISOCYTOSIS BLD QL: NORMAL
AST SERPL-CCNC: 22 U/L (ref 1–40)
BASOPHILS # BLD MANUAL: 0.06 10*3/MM3 (ref 0–0.2)
BASOPHILS NFR BLD AUTO: 1 % (ref 0–1.5)
BILIRUB SERPL-MCNC: 1.1 MG/DL (ref 0–1.2)
BUN SERPL-MCNC: 14 MG/DL (ref 8–23)
BUN/CREAT SERPL: 14.7 (ref 7–25)
CALCIUM SPEC-SCNC: 9.6 MG/DL (ref 8.6–10.5)
CHLORIDE SERPL-SCNC: 102 MMOL/L (ref 98–107)
CO2 SERPL-SCNC: 25 MMOL/L (ref 22–29)
CREAT SERPL-MCNC: 0.95 MG/DL (ref 0.76–1.27)
DEPRECATED RDW RBC AUTO: 93.9 FL (ref 37–54)
EOSINOPHIL # BLD MANUAL: 0.18 10*3/MM3 (ref 0–0.4)
EOSINOPHIL NFR BLD MANUAL: 3 % (ref 0.3–6.2)
ERYTHROCYTE [DISTWIDTH] IN BLOOD BY AUTOMATED COUNT: 22.6 % (ref 12.3–15.4)
GFR SERPL CREATININE-BSD FRML MDRD: 80 ML/MIN/1.73
GLOBULIN UR ELPH-MCNC: 2.5 GM/DL
GLUCOSE SERPL-MCNC: 105 MG/DL (ref 65–99)
HCT VFR BLD AUTO: 31.8 % (ref 37.5–51)
HGB BLD-MCNC: 10.8 G/DL (ref 13–17.7)
HOLD SPECIMEN: NORMAL
HYPOCHROMIA BLD QL: NORMAL
LYMPHOCYTES # BLD MANUAL: 1.71 10*3/MM3 (ref 0.7–3.1)
LYMPHOCYTES NFR BLD MANUAL: 28 % (ref 19.6–45.3)
LYMPHOCYTES NFR BLD MANUAL: 9 % (ref 5–12)
MCH RBC QN AUTO: 40 PG (ref 26.6–33)
MCHC RBC AUTO-ENTMCNC: 34 G/DL (ref 31.5–35.7)
MCV RBC AUTO: 117.8 FL (ref 79–97)
MONOCYTES # BLD AUTO: 0.55 10*3/MM3 (ref 0.1–0.9)
NEUTROPHILS # BLD AUTO: 3.61 10*3/MM3 (ref 1.7–7)
NEUTROPHILS NFR BLD MANUAL: 59 % (ref 42.7–76)
PLATELET # BLD AUTO: 662 10*3/MM3 (ref 140–450)
PMV BLD AUTO: 10.5 FL (ref 6–12)
POTASSIUM SERPL-SCNC: 4.2 MMOL/L (ref 3.5–5.2)
PROT SERPL-MCNC: 7.4 G/DL (ref 6–8.5)
RBC # BLD AUTO: 2.7 10*6/MM3 (ref 4.14–5.8)
SMALL PLATELETS BLD QL SMEAR: ADEQUATE
SODIUM SERPL-SCNC: 136 MMOL/L (ref 136–145)
WBC # BLD AUTO: 6.12 10*3/MM3 (ref 3.4–10.8)
WBC MORPH BLD: NORMAL

## 2021-06-01 PROCEDURE — 85007 BL SMEAR W/DIFF WBC COUNT: CPT

## 2021-06-01 PROCEDURE — 36415 COLL VENOUS BLD VENIPUNCTURE: CPT | Performed by: INTERNAL MEDICINE

## 2021-06-01 PROCEDURE — 85025 COMPLETE CBC W/AUTO DIFF WBC: CPT

## 2021-06-01 PROCEDURE — 80053 COMPREHEN METABOLIC PANEL: CPT

## 2021-06-02 ENCOUNTER — APPOINTMENT (OUTPATIENT)
Dept: ONCOLOGY | Facility: CLINIC | Age: 66
End: 2021-06-02

## 2021-06-02 ENCOUNTER — OFFICE VISIT (OUTPATIENT)
Dept: ONCOLOGY | Facility: CLINIC | Age: 66
End: 2021-06-02

## 2021-06-02 VITALS
HEIGHT: 72 IN | HEART RATE: 98 BPM | SYSTOLIC BLOOD PRESSURE: 107 MMHG | RESPIRATION RATE: 20 BRPM | WEIGHT: 205.4 LBS | TEMPERATURE: 98.7 F | BODY MASS INDEX: 27.82 KG/M2 | DIASTOLIC BLOOD PRESSURE: 85 MMHG

## 2021-06-02 DIAGNOSIS — D64.9 ANEMIA, UNSPECIFIED TYPE: Chronic | ICD-10-CM

## 2021-06-02 DIAGNOSIS — Z51.81 ENCOUNTER FOR MONITORING OF HYDROXYUREA THERAPY: Chronic | ICD-10-CM

## 2021-06-02 DIAGNOSIS — D46.9 MDS/MPN (MYELODYSPLASTIC/MYELOPROLIFERATIVE NEOPLASMS) (HCC): Primary | Chronic | ICD-10-CM

## 2021-06-02 DIAGNOSIS — R53.83 OTHER FATIGUE: Chronic | ICD-10-CM

## 2021-06-02 DIAGNOSIS — D75.839 THROMBOCYTOSIS: Chronic | ICD-10-CM

## 2021-06-02 DIAGNOSIS — Z79.64 ENCOUNTER FOR MONITORING OF HYDROXYUREA THERAPY: Chronic | ICD-10-CM

## 2021-06-02 PROCEDURE — 1126F AMNT PAIN NOTED NONE PRSNT: CPT | Performed by: INTERNAL MEDICINE

## 2021-06-02 PROCEDURE — 99214 OFFICE O/P EST MOD 30 MIN: CPT | Performed by: INTERNAL MEDICINE

## 2021-06-02 PROCEDURE — G0463 HOSPITAL OUTPT CLINIC VISIT: HCPCS | Performed by: INTERNAL MEDICINE

## 2021-06-02 PROCEDURE — G9903 PT SCRN TBCO ID AS NON USER: HCPCS | Performed by: INTERNAL MEDICINE

## 2021-06-02 PROCEDURE — 1123F ACP DISCUSS/DSCN MKR DOCD: CPT | Performed by: INTERNAL MEDICINE

## 2021-06-02 RX ORDER — HYDROXYUREA 500 MG/1
CAPSULE ORAL
Qty: 90 CAPSULE | Refills: 1 | Status: SHIPPED | OUTPATIENT
Start: 2021-06-02 | End: 2021-07-14

## 2021-06-02 NOTE — PROGRESS NOTES
DATE OF VISIT: 6/2/2021      REASON FOR VISIT: MPN/MDS overlap syndrome, anemia, thrombocytosis      HISTORY OF PRESENT ILLNESS:   -year-old male with medical problem consisting of hypertension, MPN/MDS overlap syndrome for which patient is currently on hydroxyurea, anemia, thrombocytosis is here for follow-up appointment today.  Complains of recent stomach virus resulting in nausea and vomiting.  States his symptoms are improving.  Denies any ankle ulceration.  Denies any bleeding.  Denies any new lymph node enlargement.        Oncology history:    1.  MPN/MDS overlap syndrome:  -Bone marrow biopsy done at [place] on March 23, 2020 is consistent with MPN/MDS overlap syndrome.  - Patient was on hydroxyurea until September 17, 2020 due to severe anemia with hemoglobin of 7.3 and neutropenia with absolute neutrophil count of 1.45 hydroxyurea has been discontinued.  - CBC done on September 25, 2020 shows white blood cell count is 3.97, hemoglobin is 7.7, platelet count is 432,000.     -Hydroxyurea was discontinued on September 17 2020.  -Hydroxyurea was started back on October 2, 2020 at dose of 500 mg p.o. daily.  - dose of hydroxyurea was increased to 1500 mg p.o. daily on November 20, 2020.  -Due to worsening cytopenia, dose of hydroxyurea was changed to 1500 mg p.o. on Monday Wednesday Friday and rest of the week 1000 mg on January 8, 2021            Past Medical History, Past Surgical History, Social History, Family History have been reviewed and are without significant changes except as mentioned.    Review of Systems   Constitutional: Positive for fatigue.   Gastrointestinal: Positive for nausea. Negative for diarrhea.      A comprehensive 14 point review of systems was performed and was negative except as mentioned.    Medications:  The current medication list was reviewed in the EMR    ALLERGIES:    Allergies   Allergen Reactions   • Other Rash     Pt was working with walnut wood and broke out in a rash   •  "Tetracycline Rash       Objective      Vitals:    06/02/21 1324 06/02/21 1330   BP: (!) 184/82  Comment: right 107/85  Comment: left   Pulse: 97 98   Resp: 20    Temp: 98.7 °F (37.1 °C)    TempSrc: Temporal    Weight: 93.2 kg (205 lb 6.4 oz)    Height: 182.9 cm (72.01\")    PainSc: 0-No pain      Current Status 6/2/2021   ECOG score 0       Physical Exam  Cardiovascular:      Rate and Rhythm: Normal rate and regular rhythm.   Pulmonary:      Breath sounds: Normal breath sounds.   Neurological:      Mental Status: He is alert and oriented to person, place, and time.           RECENT LABS:  Glucose   Date Value Ref Range Status   06/01/2021 105 (H) 65 - 99 mg/dL Final     Sodium   Date Value Ref Range Status   06/01/2021 136 136 - 145 mmol/L Final     Potassium   Date Value Ref Range Status   06/01/2021 4.2 3.5 - 5.2 mmol/L Final     CO2   Date Value Ref Range Status   06/01/2021 25.0 22.0 - 29.0 mmol/L Final     Chloride   Date Value Ref Range Status   06/01/2021 102 98 - 107 mmol/L Final     Anion Gap   Date Value Ref Range Status   06/01/2021 9.0 5.0 - 15.0 mmol/L Final     Creatinine   Date Value Ref Range Status   06/01/2021 0.95 0.76 - 1.27 mg/dL Final     BUN   Date Value Ref Range Status   06/01/2021 14 8 - 23 mg/dL Final     BUN/Creatinine Ratio   Date Value Ref Range Status   06/01/2021 14.7 7.0 - 25.0 Final     Calcium   Date Value Ref Range Status   06/01/2021 9.6 8.6 - 10.5 mg/dL Final     eGFR Non  Amer   Date Value Ref Range Status   06/01/2021 80 >60 mL/min/1.73 Final     Alkaline Phosphatase   Date Value Ref Range Status   06/01/2021 58 39 - 117 U/L Final     Total Protein   Date Value Ref Range Status   06/01/2021 7.4 6.0 - 8.5 g/dL Final     ALT (SGPT)   Date Value Ref Range Status   06/01/2021 24 1 - 41 U/L Final     AST (SGOT)   Date Value Ref Range Status   06/01/2021 22 1 - 40 U/L Final     Total Bilirubin   Date Value Ref Range Status   06/01/2021 1.1 0.0 - 1.2 mg/dL Final     Albumin "   Date Value Ref Range Status   06/01/2021 4.90 3.50 - 5.20 g/dL Final     Globulin   Date Value Ref Range Status   06/01/2021 2.5 gm/dL Final     Lab Results   Component Value Date    WBC 6.12 06/01/2021    HGB 10.8 (L) 06/01/2021    HCT 31.8 (L) 06/01/2021    .8 (H) 06/01/2021     (H) 06/01/2021     Lab Results   Component Value Date    NEUTROABS 3.61 06/01/2021    IRON 109 05/28/2020    IRON 84 02/26/2020    TIBC 301 05/28/2020    TIBC 375 02/26/2020    LABIRON 36 05/28/2020    LABIRON 22 02/26/2020    FERRITIN 687.00 (H) 05/28/2020    FERRITIN 684 (H) 03/23/2020    FERRITIN 776.00 (H) 02/26/2020    [id number] >2,000 (H) 05/28/2020    FOLATE 11.70 05/28/2020     No results found for: , LABCA2, AFPTM, HCGQUANT, , CHROMGRNA, [id number], CEA, REFLABREPO      PATHOLOGY:  * Cannot find OR log *         RADIOLOGY DATA :  No radiology results for the last 7 days        Assessment/Plan     1.  MPN/MDS overlap syndrome  -Review oncology history for prior treatment details  -Patient is currently on hydroxyurea 1500 mg p.o. on Monday and Thursday, rest of the week is taking 1000 mg p.o. daily  -CBC done earlier today shows white blood cell count is 6.19, hemoglobin is 10.8, platelet count is 662,000.  -In the past patient has not been able to tolerate higher than this dose of hydroxyurea secondary to severe anemia.  -Recommend continue with same dose of hydroxyurea for now  -We will recheck CBC in 3 weeks from now  -We will ask patient to return to clinic in 6 months with repeat CBC and CMP daily    2.  Anemia and thrombocytopenia states  -Secondary to MPN/MDS overlap syndrome plus hydroxyurea  -We will continue monitoring with CBC    3.  Health maintenance: Patient does not smoke    4. Advance Care Planning: For now patient remains full code and is able to make decisions.  Patient has health care surrogate mentioned on chart.    5.  Prescriptions: Prescription for hydroxyurea has been sent to his  pharmacy today             PHQ-9 Total Score: 0   -Patient is not homicidal or suicidal.  No acute intervention required.    Marv Messina reports a pain score of 0.  Given his pain assessment as noted, treatment options were discussed and the following options were decided upon as a follow-up plan to address the patient's pain: continuation of current treatment plan for pain.         Vadim Song MD  6/2/2021  17:19 CDT        Part of this note may be an electronic transcription/translation of spoken language to printed text using the Dragon Dictation System.          CC:

## 2021-06-23 ENCOUNTER — LAB (OUTPATIENT)
Dept: ONCOLOGY | Facility: HOSPITAL | Age: 66
End: 2021-06-23

## 2021-06-23 ENCOUNTER — TELEPHONE (OUTPATIENT)
Dept: ONCOLOGY | Facility: HOSPITAL | Age: 66
End: 2021-06-23

## 2021-06-23 DIAGNOSIS — D46.9 MDS/MPN (MYELODYSPLASTIC/MYELOPROLIFERATIVE NEOPLASMS) (HCC): ICD-10-CM

## 2021-06-23 DIAGNOSIS — D75.839 THROMBOCYTOSIS: ICD-10-CM

## 2021-06-23 DIAGNOSIS — D64.9 ANEMIA, UNSPECIFIED TYPE: ICD-10-CM

## 2021-06-23 LAB
ALBUMIN SERPL-MCNC: 4.3 G/DL (ref 3.5–5.2)
ALBUMIN/GLOB SERPL: 1.6 G/DL
ALP SERPL-CCNC: 59 U/L (ref 39–117)
ALT SERPL W P-5'-P-CCNC: 27 U/L (ref 1–41)
ANION GAP SERPL CALCULATED.3IONS-SCNC: 8 MMOL/L (ref 5–15)
ANISOCYTOSIS BLD QL: ABNORMAL
AST SERPL-CCNC: 26 U/L (ref 1–40)
BASOPHILS # BLD MANUAL: 0.04 10*3/MM3 (ref 0–0.2)
BASOPHILS NFR BLD AUTO: 1 % (ref 0–1.5)
BILIRUB SERPL-MCNC: 0.7 MG/DL (ref 0–1.2)
BUN SERPL-MCNC: 10 MG/DL (ref 8–23)
BUN/CREAT SERPL: 11.6 (ref 7–25)
CALCIUM SPEC-SCNC: 9.2 MG/DL (ref 8.6–10.5)
CHLORIDE SERPL-SCNC: 107 MMOL/L (ref 98–107)
CO2 SERPL-SCNC: 26 MMOL/L (ref 22–29)
CREAT SERPL-MCNC: 0.86 MG/DL (ref 0.76–1.27)
DACRYOCYTES BLD QL SMEAR: ABNORMAL
DEPRECATED RDW RBC AUTO: 96.8 FL (ref 37–54)
EOSINOPHIL # BLD MANUAL: 0.13 10*3/MM3 (ref 0–0.4)
EOSINOPHIL NFR BLD MANUAL: 3 % (ref 0.3–6.2)
ERYTHROCYTE [DISTWIDTH] IN BLOOD BY AUTOMATED COUNT: 22.7 % (ref 12.3–15.4)
GFR SERPL CREATININE-BSD FRML MDRD: 89 ML/MIN/1.73
GLOBULIN UR ELPH-MCNC: 2.7 GM/DL
GLUCOSE SERPL-MCNC: 130 MG/DL (ref 65–99)
HCT VFR BLD AUTO: 28.4 % (ref 37.5–51)
HGB BLD-MCNC: 9.7 G/DL (ref 13–17.7)
HYPOCHROMIA BLD QL: ABNORMAL
LYMPHOCYTES # BLD MANUAL: 1.89 10*3/MM3 (ref 0.7–3.1)
LYMPHOCYTES NFR BLD MANUAL: 42 % (ref 19.6–45.3)
LYMPHOCYTES NFR BLD MANUAL: 7 % (ref 5–12)
MCH RBC QN AUTO: 40.9 PG (ref 26.6–33)
MCHC RBC AUTO-ENTMCNC: 34.2 G/DL (ref 31.5–35.7)
MCV RBC AUTO: 119.8 FL (ref 79–97)
MONOCYTES # BLD AUTO: 0.31 10*3/MM3 (ref 0.1–0.9)
NEUTROPHILS # BLD AUTO: 2.11 10*3/MM3 (ref 1.7–7)
NEUTROPHILS NFR BLD MANUAL: 44 % (ref 42.7–76)
NEUTS BAND NFR BLD MANUAL: 3 % (ref 0–5)
NRBC SPEC MANUAL: 1 /100 WBC (ref 0–0.2)
PLATELET # BLD AUTO: 592 10*3/MM3 (ref 140–450)
PMV BLD AUTO: 10.3 FL (ref 6–12)
POIKILOCYTOSIS BLD QL SMEAR: ABNORMAL
POLYCHROMASIA BLD QL SMEAR: ABNORMAL
POTASSIUM SERPL-SCNC: 4.2 MMOL/L (ref 3.5–5.2)
PROT SERPL-MCNC: 7 G/DL (ref 6–8.5)
RBC # BLD AUTO: 2.37 10*6/MM3 (ref 4.14–5.8)
SMALL PLATELETS BLD QL SMEAR: ABNORMAL
SODIUM SERPL-SCNC: 141 MMOL/L (ref 136–145)
TARGETS BLD QL SMEAR: ABNORMAL
WBC # BLD AUTO: 4.49 10*3/MM3 (ref 3.4–10.8)
WBC MORPH BLD: NORMAL

## 2021-06-23 PROCEDURE — 85025 COMPLETE CBC W/AUTO DIFF WBC: CPT

## 2021-06-23 PROCEDURE — 36415 COLL VENOUS BLD VENIPUNCTURE: CPT | Performed by: INTERNAL MEDICINE

## 2021-06-23 PROCEDURE — 85007 BL SMEAR W/DIFF WBC COUNT: CPT

## 2021-06-23 PROCEDURE — 80053 COMPREHEN METABOLIC PANEL: CPT

## 2021-06-23 NOTE — TELEPHONE ENCOUNTER
Called and spoke with pt regarding lab results and taking meds as ordered by Dr. Song, v/u obtained.

## 2021-06-23 NOTE — TELEPHONE ENCOUNTER
----- Message from Vadim Song MD sent at 6/23/2021  3:09 PM CDT -----  Please let patient know, his hemoglobin today is 9.7, white blood cell count is 4.49, platelet count is 592,000.  Recommend continue with same dose of hydroxyurea for now.  Thank you

## 2021-07-12 DIAGNOSIS — D46.9 MDS/MPN (MYELODYSPLASTIC/MYELOPROLIFERATIVE NEOPLASMS) (HCC): Primary | ICD-10-CM

## 2021-07-14 ENCOUNTER — OFFICE VISIT (OUTPATIENT)
Dept: ONCOLOGY | Facility: CLINIC | Age: 66
End: 2021-07-14

## 2021-07-14 ENCOUNTER — LAB (OUTPATIENT)
Dept: ONCOLOGY | Facility: HOSPITAL | Age: 66
End: 2021-07-14

## 2021-07-14 VITALS
HEART RATE: 94 BPM | HEIGHT: 72 IN | WEIGHT: 208.5 LBS | TEMPERATURE: 98.1 F | BODY MASS INDEX: 28.24 KG/M2 | RESPIRATION RATE: 18 BRPM | SYSTOLIC BLOOD PRESSURE: 172 MMHG | DIASTOLIC BLOOD PRESSURE: 88 MMHG

## 2021-07-14 DIAGNOSIS — D46.9 MDS/MPN (MYELODYSPLASTIC/MYELOPROLIFERATIVE NEOPLASMS) (HCC): ICD-10-CM

## 2021-07-14 DIAGNOSIS — D75.839 THROMBOCYTOSIS: Primary | Chronic | ICD-10-CM

## 2021-07-14 DIAGNOSIS — Z51.81 ENCOUNTER FOR MONITORING OF HYDROXYUREA THERAPY: Chronic | ICD-10-CM

## 2021-07-14 DIAGNOSIS — D46.9 MDS/MPN (MYELODYSPLASTIC/MYELOPROLIFERATIVE NEOPLASMS) (HCC): Chronic | ICD-10-CM

## 2021-07-14 DIAGNOSIS — R53.83 OTHER FATIGUE: Chronic | ICD-10-CM

## 2021-07-14 DIAGNOSIS — Z79.64 ENCOUNTER FOR MONITORING OF HYDROXYUREA THERAPY: Chronic | ICD-10-CM

## 2021-07-14 DIAGNOSIS — D64.9 ANEMIA, UNSPECIFIED TYPE: Chronic | ICD-10-CM

## 2021-07-14 LAB
ALBUMIN SERPL-MCNC: 4.9 G/DL (ref 3.5–5.2)
ALBUMIN/GLOB SERPL: 2 G/DL
ALP SERPL-CCNC: 67 U/L (ref 39–117)
ALT SERPL W P-5'-P-CCNC: 28 U/L (ref 1–41)
ANION GAP SERPL CALCULATED.3IONS-SCNC: 8 MMOL/L (ref 5–15)
ANISOCYTOSIS BLD QL: NORMAL
AST SERPL-CCNC: 25 U/L (ref 1–40)
BILIRUB SERPL-MCNC: 0.8 MG/DL (ref 0–1.2)
BUN SERPL-MCNC: 11 MG/DL (ref 8–23)
BUN/CREAT SERPL: 12.2 (ref 7–25)
CALCIUM SPEC-SCNC: 9.2 MG/DL (ref 8.6–10.5)
CHLORIDE SERPL-SCNC: 101 MMOL/L (ref 98–107)
CO2 SERPL-SCNC: 26 MMOL/L (ref 22–29)
CREAT SERPL-MCNC: 0.9 MG/DL (ref 0.76–1.27)
DEPRECATED RDW RBC AUTO: 93.8 FL (ref 37–54)
EOSINOPHIL # BLD MANUAL: 0.06 10*3/MM3 (ref 0–0.4)
EOSINOPHIL NFR BLD MANUAL: 1 % (ref 0.3–6.2)
ERYTHROCYTE [DISTWIDTH] IN BLOOD BY AUTOMATED COUNT: 21.8 % (ref 12.3–15.4)
GFR SERPL CREATININE-BSD FRML MDRD: 85 ML/MIN/1.73
GLOBULIN UR ELPH-MCNC: 2.5 GM/DL
GLUCOSE SERPL-MCNC: 104 MG/DL (ref 65–99)
HCT VFR BLD AUTO: 32.4 % (ref 37.5–51)
HGB BLD-MCNC: 11.1 G/DL (ref 13–17.7)
HOLD SPECIMEN: NORMAL
HYPOCHROMIA BLD QL: NORMAL
LYMPHOCYTES # BLD MANUAL: 1.46 10*3/MM3 (ref 0.7–3.1)
LYMPHOCYTES NFR BLD MANUAL: 24 % (ref 19.6–45.3)
LYMPHOCYTES NFR BLD MANUAL: 8 % (ref 5–12)
MCH RBC QN AUTO: 40.7 PG (ref 26.6–33)
MCHC RBC AUTO-ENTMCNC: 34.3 G/DL (ref 31.5–35.7)
MCV RBC AUTO: 118.7 FL (ref 79–97)
MONOCYTES # BLD AUTO: 0.49 10*3/MM3 (ref 0.1–0.9)
NEUTROPHILS # BLD AUTO: 4.07 10*3/MM3 (ref 1.7–7)
NEUTROPHILS NFR BLD MANUAL: 67 % (ref 42.7–76)
PLATELET # BLD AUTO: 734 10*3/MM3 (ref 140–450)
PMV BLD AUTO: 10 FL (ref 6–12)
POIKILOCYTOSIS BLD QL SMEAR: NORMAL
POTASSIUM SERPL-SCNC: 4.2 MMOL/L (ref 3.5–5.2)
PROT SERPL-MCNC: 7.4 G/DL (ref 6–8.5)
RBC # BLD AUTO: 2.73 10*6/MM3 (ref 4.14–5.8)
SMALL PLATELETS BLD QL SMEAR: NORMAL
SODIUM SERPL-SCNC: 135 MMOL/L (ref 136–145)
WBC # BLD AUTO: 6.07 10*3/MM3 (ref 3.4–10.8)
WBC MORPH BLD: NORMAL

## 2021-07-14 PROCEDURE — 1126F AMNT PAIN NOTED NONE PRSNT: CPT | Performed by: INTERNAL MEDICINE

## 2021-07-14 PROCEDURE — 85007 BL SMEAR W/DIFF WBC COUNT: CPT

## 2021-07-14 PROCEDURE — 85025 COMPLETE CBC W/AUTO DIFF WBC: CPT

## 2021-07-14 PROCEDURE — G9903 PT SCRN TBCO ID AS NON USER: HCPCS | Performed by: INTERNAL MEDICINE

## 2021-07-14 PROCEDURE — 99214 OFFICE O/P EST MOD 30 MIN: CPT | Performed by: INTERNAL MEDICINE

## 2021-07-14 PROCEDURE — 1123F ACP DISCUSS/DSCN MKR DOCD: CPT | Performed by: INTERNAL MEDICINE

## 2021-07-14 PROCEDURE — G0463 HOSPITAL OUTPT CLINIC VISIT: HCPCS | Performed by: INTERNAL MEDICINE

## 2021-07-14 PROCEDURE — 80053 COMPREHEN METABOLIC PANEL: CPT

## 2021-07-14 RX ORDER — HYDROXYUREA 500 MG/1
CAPSULE ORAL
Qty: 90 CAPSULE | Refills: 1 | Status: SHIPPED | OUTPATIENT
Start: 2021-07-14 | End: 2021-08-25 | Stop reason: SDUPTHER

## 2021-07-14 NOTE — PROGRESS NOTES
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  Denies any excessive nausea or vomiting or diarrhea.  Denies any ankle ulceration.  Denies any bleeding.  Denies any new lymph node enlargement.            Oncology history:    1.  MPN/MDS overlap syndrome:  -Bone marrow biopsy done at [place] on March 23, 2020 is consistent with MPN/MDS overlap syndrome.  - Patient was on hydroxyurea until September 17, 2020 due to severe anemia with hemoglobin of 7.3 and neutropenia with absolute neutrophil count of 1.45 hydroxyurea has been discontinued.  - CBC done on September 25, 2020 shows white blood cell count is 3.97, hemoglobin is 7.7, platelet count is 432,000.     -Hydroxyurea was discontinued on September 17 2020.  -Hydroxyurea was started back on October 2, 2020 at dose of 500 mg p.o. daily.  - dose of hydroxyurea was increased to 1500 mg p.o. daily on November 20, 2020.  -Due to worsening cytopenia, dose of hydroxyurea was changed to 1500 mg p.o. on Monday Wednesday Friday and rest of the week 1000 mg on January 8, 2021      Past Medical History, Past Surgical History, Social History, Family History have been reviewed and are without significant changes except as mentioned.    Review of Systems   Constitutional: Positive for fatigue.   Gastrointestinal: Negative for blood in stool, diarrhea and nausea.   Hematological: Negative for adenopathy.      A comprehensive 14 point review of systems was performed and was negative except as mentioned.    Medications:  The current medication list was reviewed in the EMR    ALLERGIES:    Allergies   Allergen Reactions   • Other Rash     Pt was working with walnut wood and broke out in a rash   • Tetracycline  "Rash       Objective      Vitals:    07/14/21 1300   BP: 172/88   Pulse: 94   Resp: 18   Temp: 98.1 °F (36.7 °C)   TempSrc: Temporal   Weight: 94.6 kg (208 lb 8 oz)   Height: 182.9 cm (72.01\")   PainSc:   3   PainLoc: Back     Current Status 7/14/2021   ECOG score 0       Physical Exam  Cardiovascular:      Rate and Rhythm: Normal rate and regular rhythm.   Pulmonary:      Breath sounds: Normal breath sounds.   Neurological:      Mental Status: He is alert and oriented to person, place, and time.           RECENT LABS:  Glucose   Date Value Ref Range Status   07/14/2021 104 (H) 65 - 99 mg/dL Final     Sodium   Date Value Ref Range Status   07/14/2021 135 (L) 136 - 145 mmol/L Final     Potassium   Date Value Ref Range Status   07/14/2021 4.2 3.5 - 5.2 mmol/L Final     CO2   Date Value Ref Range Status   07/14/2021 26.0 22.0 - 29.0 mmol/L Final     Chloride   Date Value Ref Range Status   07/14/2021 101 98 - 107 mmol/L Final     Anion Gap   Date Value Ref Range Status   07/14/2021 8.0 5.0 - 15.0 mmol/L Final     Creatinine   Date Value Ref Range Status   07/14/2021 0.90 0.76 - 1.27 mg/dL Final     BUN   Date Value Ref Range Status   07/14/2021 11 8 - 23 mg/dL Final     BUN/Creatinine Ratio   Date Value Ref Range Status   07/14/2021 12.2 7.0 - 25.0 Final     Calcium   Date Value Ref Range Status   07/14/2021 9.2 8.6 - 10.5 mg/dL Final     eGFR Non  Amer   Date Value Ref Range Status   07/14/2021 85 >60 mL/min/1.73 Final     Alkaline Phosphatase   Date Value Ref Range Status   07/14/2021 67 39 - 117 U/L Final     Total Protein   Date Value Ref Range Status   07/14/2021 7.4 6.0 - 8.5 g/dL Final     ALT (SGPT)   Date Value Ref Range Status   07/14/2021 28 1 - 41 U/L Final     AST (SGOT)   Date Value Ref Range Status   07/14/2021 25 1 - 40 U/L Final     Total Bilirubin   Date Value Ref Range Status   07/14/2021 0.8 0.0 - 1.2 mg/dL Final     Albumin   Date Value Ref Range Status   07/14/2021 4.90 3.50 - 5.20 g/dL " Final     Globulin   Date Value Ref Range Status   07/14/2021 2.5 gm/dL Final     Lab Results   Component Value Date    WBC 6.07 07/14/2021    HGB 11.1 (L) 07/14/2021    HCT 32.4 (L) 07/14/2021    .7 (H) 07/14/2021     (H) 07/14/2021     Lab Results   Component Value Date    NEUTROABS 2.11 06/23/2021    IRON 109 05/28/2020    IRON 84 02/26/2020    TIBC 301 05/28/2020    TIBC 375 02/26/2020    LABIRON 36 05/28/2020    LABIRON 22 02/26/2020    FERRITIN 687.00 (H) 05/28/2020    FERRITIN 684 (H) 03/23/2020    FERRITIN 776.00 (H) 02/26/2020    [id number] >2,000 (H) 05/28/2020    FOLATE 11.70 05/28/2020     No results found for: , LABCA2, AFPTM, HCGQUANT, , CHROMGRNA, [id number], CEA, REFLABREPO      PATHOLOGY:  * Cannot find OR log *         RADIOLOGY DATA :  No radiology results for the last 7 days        Assessment/Plan     1.  MPN/MDS overlap syndrome  -Review oncology history for prior treatment details  -Patient is currently on hydroxyurea 1500 mg p.o. on Monday and Thursday rest of the week taking 1000 mg p.o. daily.  -In the past patient was not able to tolerate any higher dose of hydroxyurea than 1 he is on right now secondary to severe cytopenia.  -CBC done earlier today shows white blood cell count is 6.07, hemoglobin is 11.1.  Platelet count is 734,000.  Due to increase in platelet count recommend increasing hydroxyurea to 1500 mg on Monday, Wednesday and Friday and rest of the week take 1000 mg p.o. daily.  -We will ask patient to have CBC done in 3 weeks from now  -We will ask patient to return to clinic in 4 weeks with repeat CBC and CMP on that day.    2.  Anemia and thrombocytosis:  -Secondary to MPN/MDS overlap syndrome plus hydroxyurea  -We will continue with monitoring with CBC for now    3.  Health maintenance: Patient does not smoke    4.  Advance Care Planning: For now patient remains full code and is able to make decisions.  Patient has health care surrogate mentioned on  chart.    5.  Prescriptions: Prescription hydroxyurea has been sent to his pharmacy             PHQ-9 Total Score: 0   -Patient is not homicidal or suicidal.  No acute intervention required.    Marv Messina reports a pain score of 3.  Given his pain assessment as noted, treatment options were discussed and the following options were decided upon as a follow-up plan to address the patient's pain: continuation of current treatment plan for pain.         Vadim Song MD  7/14/2021  13:16 CDT        Part of this note may be an electronic transcription/translation of spoken language to printed text using the Dragon Dictation System.          CC:

## 2021-08-04 ENCOUNTER — LAB (OUTPATIENT)
Dept: ONCOLOGY | Facility: HOSPITAL | Age: 66
End: 2021-08-04

## 2021-08-04 DIAGNOSIS — D75.839 THROMBOCYTOSIS: Primary | ICD-10-CM

## 2021-08-04 LAB
ANISOCYTOSIS BLD QL: NORMAL
BASOPHILS # BLD AUTO: 0.08 10*3/MM3 (ref 0–0.2)
BASOPHILS NFR BLD AUTO: 1 % (ref 0–1.5)
DEPRECATED RDW RBC AUTO: 85.5 FL (ref 37–54)
EOSINOPHIL # BLD AUTO: 0.16 10*3/MM3 (ref 0–0.4)
EOSINOPHIL NFR BLD AUTO: 2.1 % (ref 0.3–6.2)
ERYTHROCYTE [DISTWIDTH] IN BLOOD BY AUTOMATED COUNT: 21.6 % (ref 12.3–15.4)
HCT VFR BLD AUTO: 30 % (ref 37.5–51)
HGB BLD-MCNC: 10.4 G/DL (ref 13–17.7)
HYPOCHROMIA BLD QL: NORMAL
IMM GRANULOCYTES # BLD AUTO: 0.05 10*3/MM3 (ref 0–0.05)
IMM GRANULOCYTES NFR BLD AUTO: 0.7 % (ref 0–0.5)
LYMPHOCYTES # BLD AUTO: 1.94 10*3/MM3 (ref 0.7–3.1)
LYMPHOCYTES NFR BLD AUTO: 25.3 % (ref 19.6–45.3)
MCH RBC QN AUTO: 39.8 PG (ref 26.6–33)
MCHC RBC AUTO-ENTMCNC: 34.7 G/DL (ref 31.5–35.7)
MCV RBC AUTO: 114.9 FL (ref 79–97)
MONOCYTES # BLD AUTO: 0.34 10*3/MM3 (ref 0.1–0.9)
MONOCYTES NFR BLD AUTO: 4.4 % (ref 5–12)
NEUTROPHILS NFR BLD AUTO: 5.1 10*3/MM3 (ref 1.7–7)
NEUTROPHILS NFR BLD AUTO: 66.5 % (ref 42.7–76)
NRBC BLD AUTO-RTO: 0.9 /100 WBC (ref 0–0.2)
PLATELET # BLD AUTO: 578 10*3/MM3 (ref 140–450)
PMV BLD AUTO: 10.9 FL (ref 6–12)
RBC # BLD AUTO: 2.61 10*6/MM3 (ref 4.14–5.8)
SMALL PLATELETS BLD QL SMEAR: NORMAL
WBC # BLD AUTO: 7.67 10*3/MM3 (ref 3.4–10.8)
WBC MORPH BLD: NORMAL

## 2021-08-04 PROCEDURE — 85007 BL SMEAR W/DIFF WBC COUNT: CPT

## 2021-08-04 PROCEDURE — 36415 COLL VENOUS BLD VENIPUNCTURE: CPT | Performed by: INTERNAL MEDICINE

## 2021-08-04 PROCEDURE — 85025 COMPLETE CBC W/AUTO DIFF WBC: CPT

## 2021-08-05 ENCOUNTER — TELEPHONE (OUTPATIENT)
Dept: ONCOLOGY | Facility: HOSPITAL | Age: 66
End: 2021-08-05

## 2021-08-05 NOTE — TELEPHONE ENCOUNTER
----- Message from Vadim Song MD sent at 8/5/2021  2:27 PM CDT -----  Please let patient know, his white blood cell count is 7.67, hemoglobin is 10.4, platelet count is 578,000.  Recommend continuing with hydroxyurea 1500 mg on Monday, Wednesday and Friday.  Rest of the week recommend hydroxyurea 1000 mg p.o. daily.  Thank you

## 2021-08-25 ENCOUNTER — LAB (OUTPATIENT)
Dept: ONCOLOGY | Facility: HOSPITAL | Age: 66
End: 2021-08-25

## 2021-08-25 ENCOUNTER — OFFICE VISIT (OUTPATIENT)
Dept: ONCOLOGY | Facility: CLINIC | Age: 66
End: 2021-08-25

## 2021-08-25 VITALS
TEMPERATURE: 98.3 F | SYSTOLIC BLOOD PRESSURE: 162 MMHG | DIASTOLIC BLOOD PRESSURE: 74 MMHG | OXYGEN SATURATION: 96 % | WEIGHT: 206 LBS | BODY MASS INDEX: 27.93 KG/M2 | RESPIRATION RATE: 18 BRPM | HEART RATE: 107 BPM

## 2021-08-25 DIAGNOSIS — D64.9 ANEMIA, UNSPECIFIED TYPE: Chronic | ICD-10-CM

## 2021-08-25 DIAGNOSIS — D75.839 THROMBOCYTOSIS: ICD-10-CM

## 2021-08-25 DIAGNOSIS — D46.9 MDS/MPN (MYELODYSPLASTIC/MYELOPROLIFERATIVE NEOPLASMS) (HCC): ICD-10-CM

## 2021-08-25 DIAGNOSIS — R53.83 OTHER FATIGUE: ICD-10-CM

## 2021-08-25 DIAGNOSIS — D72.829 LEUKOCYTOSIS, UNSPECIFIED TYPE: ICD-10-CM

## 2021-08-25 DIAGNOSIS — D46.9 MDS/MPN (MYELODYSPLASTIC/MYELOPROLIFERATIVE NEOPLASMS) (HCC): Primary | ICD-10-CM

## 2021-08-25 DIAGNOSIS — D64.9 ANEMIA, UNSPECIFIED TYPE: ICD-10-CM

## 2021-08-25 LAB
ALBUMIN SERPL-MCNC: 4.6 G/DL (ref 3.5–5.2)
ALBUMIN/GLOB SERPL: 1.9 G/DL
ALP SERPL-CCNC: 56 U/L (ref 39–117)
ALT SERPL W P-5'-P-CCNC: 26 U/L (ref 1–41)
ANION GAP SERPL CALCULATED.3IONS-SCNC: 10 MMOL/L (ref 5–15)
ANISOCYTOSIS BLD QL: NORMAL
AST SERPL-CCNC: 22 U/L (ref 1–40)
BASOPHILS # BLD AUTO: 0.07 10*3/MM3 (ref 0–0.2)
BASOPHILS # BLD MANUAL: 0.06 10*3/MM3 (ref 0–0.2)
BASOPHILS NFR BLD AUTO: 1 % (ref 0–1.5)
BASOPHILS NFR BLD AUTO: 1.2 % (ref 0–1.5)
BILIRUB SERPL-MCNC: 0.7 MG/DL (ref 0–1.2)
BUN SERPL-MCNC: 13 MG/DL (ref 8–23)
BUN/CREAT SERPL: 14.6 (ref 7–25)
CALCIUM SPEC-SCNC: 9.3 MG/DL (ref 8.6–10.5)
CHLORIDE SERPL-SCNC: 103 MMOL/L (ref 98–107)
CO2 SERPL-SCNC: 26 MMOL/L (ref 22–29)
CREAT SERPL-MCNC: 0.89 MG/DL (ref 0.76–1.27)
DEPRECATED RDW RBC AUTO: 90.2 FL (ref 37–54)
EOSINOPHIL # BLD AUTO: 0.14 10*3/MM3 (ref 0–0.4)
EOSINOPHIL # BLD MANUAL: 0.12 10*3/MM3 (ref 0–0.4)
EOSINOPHIL NFR BLD AUTO: 2.3 % (ref 0.3–6.2)
EOSINOPHIL NFR BLD MANUAL: 2 % (ref 0.3–6.2)
ERYTHROCYTE [DISTWIDTH] IN BLOOD BY AUTOMATED COUNT: 22.2 % (ref 12.3–15.4)
GFR SERPL CREATININE-BSD FRML MDRD: 86 ML/MIN/1.73
GLOBULIN UR ELPH-MCNC: 2.4 GM/DL
GLUCOSE SERPL-MCNC: 134 MG/DL (ref 65–99)
HCT VFR BLD AUTO: 30 % (ref 37.5–51)
HGB BLD-MCNC: 10.2 G/DL (ref 13–17.7)
HOLD SPECIMEN: NORMAL
HYPOCHROMIA BLD QL: NORMAL
IMM GRANULOCYTES # BLD AUTO: 0.04 10*3/MM3 (ref 0–0.05)
IMM GRANULOCYTES NFR BLD AUTO: 0.7 % (ref 0–0.5)
LYMPHOCYTES # BLD AUTO: 1.9 10*3/MM3 (ref 0.7–3.1)
LYMPHOCYTES # BLD MANUAL: 2.05 10*3/MM3 (ref 0.7–3.1)
LYMPHOCYTES NFR BLD AUTO: 31.6 % (ref 19.6–45.3)
LYMPHOCYTES NFR BLD MANUAL: 34 % (ref 19.6–45.3)
LYMPHOCYTES NFR BLD MANUAL: 5 % (ref 5–12)
MCH RBC QN AUTO: 38.9 PG (ref 26.6–33)
MCHC RBC AUTO-ENTMCNC: 34 G/DL (ref 31.5–35.7)
MCV RBC AUTO: 114.5 FL (ref 79–97)
MONOCYTES # BLD AUTO: 0.3 10*3/MM3 (ref 0.1–0.9)
MONOCYTES # BLD AUTO: 0.31 10*3/MM3 (ref 0.1–0.9)
MONOCYTES NFR BLD AUTO: 5.1 % (ref 5–12)
NEUTROPHILS # BLD AUTO: 3.49 10*3/MM3 (ref 1.7–7)
NEUTROPHILS NFR BLD AUTO: 3.56 10*3/MM3 (ref 1.7–7)
NEUTROPHILS NFR BLD AUTO: 59.1 % (ref 42.7–76)
NEUTROPHILS NFR BLD MANUAL: 55 % (ref 42.7–76)
NEUTS BAND NFR BLD MANUAL: 3 % (ref 0–5)
NRBC BLD AUTO-RTO: 1.5 /100 WBC (ref 0–0.2)
PLATELET # BLD AUTO: 711 10*3/MM3 (ref 140–450)
PMV BLD AUTO: 10.6 FL (ref 6–12)
POTASSIUM SERPL-SCNC: 4.1 MMOL/L (ref 3.5–5.2)
PROT SERPL-MCNC: 7 G/DL (ref 6–8.5)
RBC # BLD AUTO: 2.62 10*6/MM3 (ref 4.14–5.8)
SMALL PLATELETS BLD QL SMEAR: NORMAL
SODIUM SERPL-SCNC: 139 MMOL/L (ref 136–145)
WBC # BLD AUTO: 6.02 10*3/MM3 (ref 3.4–10.8)
WBC MORPH BLD: NORMAL

## 2021-08-25 PROCEDURE — 85007 BL SMEAR W/DIFF WBC COUNT: CPT | Performed by: INTERNAL MEDICINE

## 2021-08-25 PROCEDURE — 99214 OFFICE O/P EST MOD 30 MIN: CPT | Performed by: INTERNAL MEDICINE

## 2021-08-25 PROCEDURE — G9903 PT SCRN TBCO ID AS NON USER: HCPCS | Performed by: INTERNAL MEDICINE

## 2021-08-25 PROCEDURE — 80053 COMPREHEN METABOLIC PANEL: CPT | Performed by: INTERNAL MEDICINE

## 2021-08-25 PROCEDURE — G0463 HOSPITAL OUTPT CLINIC VISIT: HCPCS | Performed by: INTERNAL MEDICINE

## 2021-08-25 PROCEDURE — 1123F ACP DISCUSS/DSCN MKR DOCD: CPT | Performed by: INTERNAL MEDICINE

## 2021-08-25 PROCEDURE — 85025 COMPLETE CBC W/AUTO DIFF WBC: CPT | Performed by: INTERNAL MEDICINE

## 2021-08-25 PROCEDURE — 36415 COLL VENOUS BLD VENIPUNCTURE: CPT

## 2021-08-25 PROCEDURE — 1126F AMNT PAIN NOTED NONE PRSNT: CPT | Performed by: INTERNAL MEDICINE

## 2021-08-25 RX ORDER — HYDROXYUREA 500 MG/1
CAPSULE ORAL
Qty: 90 CAPSULE | Refills: 1 | Status: SHIPPED | OUTPATIENT
Start: 2021-08-25 | End: 2021-09-30 | Stop reason: SDUPTHER

## 2021-08-25 NOTE — PROGRESS NOTES
DATE OF VISIT: 8/25/2021      REASON FOR VISIT: MPN/MDS overlap syndrome, anemia      HISTORY OF PRESENT ILLNESS:   66-year-old male with medical problem consisting of hypertension, MPN/MDS overlap syndrome for which patient is currently on hydroxyurea, anemia, thrombocytosis is here for follow-up appointment today.  Denies any excessive nausea or vomiting or diarrhea.  Denies any ankle ulceration.  Denies any bleeding.  Denies any new lymph node enlargement.          Oncology history:    1.  MPN/MDS overlap syndrome:  -Bone marrow biopsy done at [facility] on March 23, 2020 is consistent with MPN/MDS overlap syndrome.  - Patient was on hydroxyurea until September 17, 2020 due to severe anemia with hemoglobin of 7.3 and neutropenia with absolute neutrophil count of 1.45 hydroxyurea has been discontinued.  - CBC done on September 25, 2020 shows white blood cell count is 3.97, hemoglobin is 7.7, platelet count is 432,000.     -Hydroxyurea was discontinued on September 17 2020.  -Hydroxyurea was started back on October 2, 2020 at dose of 500 mg p.o. daily.  - dose of hydroxyurea was increased to 1500 mg p.o. daily on November 20, 2020.  -Due to worsening cytopenia, dose of hydroxyurea was changed to 1500 mg p.o. on Monday Wednesday Friday and rest of the week 1000 mg on January 8, 2021  -Dose of hydroxyurea was changed to 1500 mg p.o. daily on Monday Wednesday and Friday and rest of the week 1000 mg p.o. daily on July 14, 2021        Past Medical History, Past Surgical History, Social History, Family History have been reviewed and are without significant changes except as mentioned.    Review of Systems   Constitutional: Positive for fatigue.      A comprehensive 14 point review of systems was performed and was negative except as mentioned.    Medications:  The current medication list was reviewed in the EMR    ALLERGIES:    Allergies   Allergen Reactions   • Other Rash     Pt was working with walnut wood and broke out  in a rash   • Tetracycline Rash       Objective      Vitals:    08/25/21 1351   BP: 162/74   Pulse: 107   Resp: 18   Temp: 98.3 °F (36.8 °C)   SpO2: 96%   Weight: 93.4 kg (206 lb)   PainSc: 0-No pain     Current Status 7/14/2021   ECOG score 0       Physical Exam  Pulmonary:      Breath sounds: Normal breath sounds.   Neurological:      Mental Status: He is alert and oriented to person, place, and time.           RECENT LABS:  Glucose   Date Value Ref Range Status   08/25/2021 134 (H) 65 - 99 mg/dL Final     Sodium   Date Value Ref Range Status   08/25/2021 139 136 - 145 mmol/L Final     Potassium   Date Value Ref Range Status   08/25/2021 4.1 3.5 - 5.2 mmol/L Final     CO2   Date Value Ref Range Status   08/25/2021 26.0 22.0 - 29.0 mmol/L Final     Chloride   Date Value Ref Range Status   08/25/2021 103 98 - 107 mmol/L Final     Anion Gap   Date Value Ref Range Status   08/25/2021 10.0 5.0 - 15.0 mmol/L Final     Creatinine   Date Value Ref Range Status   08/25/2021 0.89 0.76 - 1.27 mg/dL Final     BUN   Date Value Ref Range Status   08/25/2021 13 8 - 23 mg/dL Final     BUN/Creatinine Ratio   Date Value Ref Range Status   08/25/2021 14.6 7.0 - 25.0 Final     Calcium   Date Value Ref Range Status   08/25/2021 9.3 8.6 - 10.5 mg/dL Final     eGFR Non  Amer   Date Value Ref Range Status   08/25/2021 86 >60 mL/min/1.73 Final     Alkaline Phosphatase   Date Value Ref Range Status   08/25/2021 56 39 - 117 U/L Final     Total Protein   Date Value Ref Range Status   08/25/2021 7.0 6.0 - 8.5 g/dL Final     ALT (SGPT)   Date Value Ref Range Status   08/25/2021 26 1 - 41 U/L Final     AST (SGOT)   Date Value Ref Range Status   08/25/2021 22 1 - 40 U/L Final     Total Bilirubin   Date Value Ref Range Status   08/25/2021 0.7 0.0 - 1.2 mg/dL Final     Albumin   Date Value Ref Range Status   08/25/2021 4.60 3.50 - 5.20 g/dL Final     Globulin   Date Value Ref Range Status   08/25/2021 2.4 gm/dL Final     Lab Results    Component Value Date    WBC 6.02 08/25/2021    HGB 10.2 (L) 08/25/2021    HCT 30.0 (L) 08/25/2021    .5 (H) 08/25/2021     (H) 08/25/2021     Lab Results   Component Value Date    NEUTROABS 3.56 08/25/2021    IRON 109 05/28/2020    IRON 84 02/26/2020    TIBC 301 05/28/2020    TIBC 375 02/26/2020    LABIRON 36 05/28/2020    LABIRON 22 02/26/2020    FERRITIN 687.00 (H) 05/28/2020    FERRITIN 684 (H) 03/23/2020    FERRITIN 776.00 (H) 02/26/2020    [id number] >2,000 (H) 05/28/2020    FOLATE 11.70 05/28/2020     No results found for: , LABCA2, AFPTM, HCGQUANT, , CHROMGRNA, [id number], CEA, REFLABREPO      PATHOLOGY:  * Cannot find OR log *         RADIOLOGY DATA :  No radiology results for the last 7 days        Assessment/Plan     1.  MPN/MDS overlap syndrome:  -Review oncology history for prior treatment details  -Currently on hydroxyurea 1500 mg p.o. daily on Monday, Wednesday and Friday.  Rest of the week he is taking 1000 mg p.o. daily  -CBC done earlier today shows wbc is 8, hemoglobin is 10.2, platelet count is 711,000  -We will ask patient to have repeat CBC done in 3 weeks from now  -We will ask patient to return to clinic in 6 weeks with repeat CBC and CMP on that day    2.  Anemia and thrombocytosis:  -Secondary to MPN/MDS overlap syndrome plus hydroxyurea  -We will monitor with CBC    3.  Health maintenance: Patient does not smoke    4. Advance Care Planning: For now patient remains full code and is able to make decisions.  Patient has health care surrogate mentioned on chart.    5.  Prescriptions: Prescription for hydroxyurea has been sent to his pharmacy today             PHQ-9 Total Score: 0   -Patient is not homicidal or suicidal.  No acute intervention required.    Marv Messina reports a pain score of 0.  Given his pain assessment as noted, treatment options were discussed and the following options were decided upon as a follow-up plan to address the patient's pain:  continuation of current treatment plan for pain.         Vadim Song MD  8/25/2021  14:48 CDT        Part of this note may be an electronic transcription/translation of spoken language to printed text using the Dragon Dictation System.          CC:

## 2021-09-15 ENCOUNTER — LAB (OUTPATIENT)
Dept: ONCOLOGY | Facility: HOSPITAL | Age: 66
End: 2021-09-15

## 2021-09-15 DIAGNOSIS — D64.9 ANEMIA, UNSPECIFIED TYPE: ICD-10-CM

## 2021-09-15 DIAGNOSIS — D75.839 THROMBOCYTOSIS: ICD-10-CM

## 2021-09-15 DIAGNOSIS — R53.83 OTHER FATIGUE: ICD-10-CM

## 2021-09-15 DIAGNOSIS — D46.9 MDS/MPN (MYELODYSPLASTIC/MYELOPROLIFERATIVE NEOPLASMS) (HCC): ICD-10-CM

## 2021-09-15 LAB
ANISOCYTOSIS BLD QL: NORMAL
BASOPHILS # BLD AUTO: 0.05 10*3/MM3 (ref 0–0.2)
BASOPHILS NFR BLD AUTO: 1.1 % (ref 0–1.5)
DEPRECATED RDW RBC AUTO: 94 FL (ref 37–54)
EOSINOPHIL # BLD AUTO: 0.11 10*3/MM3 (ref 0–0.4)
EOSINOPHIL NFR BLD AUTO: 2.4 % (ref 0.3–6.2)
ERYTHROCYTE [DISTWIDTH] IN BLOOD BY AUTOMATED COUNT: 23 % (ref 12.3–15.4)
HCT VFR BLD AUTO: 27.7 % (ref 37.5–51)
HGB BLD-MCNC: 9.5 G/DL (ref 13–17.7)
HYPOCHROMIA BLD QL: NORMAL
IMM GRANULOCYTES # BLD AUTO: 0.03 10*3/MM3 (ref 0–0.05)
IMM GRANULOCYTES NFR BLD AUTO: 0.7 % (ref 0–0.5)
LYMPHOCYTES # BLD AUTO: 1.08 10*3/MM3 (ref 0.7–3.1)
LYMPHOCYTES NFR BLD AUTO: 23.9 % (ref 19.6–45.3)
MACROCYTES BLD QL SMEAR: NORMAL
MCH RBC QN AUTO: 39.9 PG (ref 26.6–33)
MCHC RBC AUTO-ENTMCNC: 34.3 G/DL (ref 31.5–35.7)
MCV RBC AUTO: 116.4 FL (ref 79–97)
MONOCYTES # BLD AUTO: 0.26 10*3/MM3 (ref 0.1–0.9)
MONOCYTES NFR BLD AUTO: 5.8 % (ref 5–12)
NEUTROPHILS NFR BLD AUTO: 2.98 10*3/MM3 (ref 1.7–7)
NEUTROPHILS NFR BLD AUTO: 66.1 % (ref 42.7–76)
NRBC BLD AUTO-RTO: 1.3 /100 WBC (ref 0–0.2)
PLATELET # BLD AUTO: 565 10*3/MM3 (ref 140–450)
PMV BLD AUTO: 10.8 FL (ref 6–12)
POIKILOCYTOSIS BLD QL SMEAR: NORMAL
POLYCHROMASIA BLD QL SMEAR: NORMAL
RBC # BLD AUTO: 2.38 10*6/MM3 (ref 4.14–5.8)
SMALL PLATELETS BLD QL SMEAR: NORMAL
WBC # BLD AUTO: 4.51 10*3/MM3 (ref 3.4–10.8)
WBC MORPH BLD: NORMAL

## 2021-09-15 PROCEDURE — 85007 BL SMEAR W/DIFF WBC COUNT: CPT

## 2021-09-15 PROCEDURE — 36415 COLL VENOUS BLD VENIPUNCTURE: CPT | Performed by: NURSE PRACTITIONER

## 2021-09-15 PROCEDURE — 85025 COMPLETE CBC W/AUTO DIFF WBC: CPT

## 2021-09-30 ENCOUNTER — TELEPHONE (OUTPATIENT)
Dept: ONCOLOGY | Facility: CLINIC | Age: 66
End: 2021-09-30

## 2021-09-30 RX ORDER — HYDROXYUREA 500 MG/1
CAPSULE ORAL
Qty: 90 CAPSULE | Refills: 1 | Status: SHIPPED | OUTPATIENT
Start: 2021-09-30 | End: 2021-11-14 | Stop reason: SDUPTHER

## 2021-09-30 NOTE — TELEPHONE ENCOUNTER
Rx Refill Note  Requested Prescriptions     Pending Prescriptions Disp Refills   • hydroxyurea (HYDREA) 500 MG capsule 90 capsule 1     Sig: Take 3 capsule  daily      Last office visit with prescribing clinician: 8/25/2021      Next office visit with prescribing clinician: 2/23/2022            Nova Herrera RN  09/30/21, 14:38 CDT

## 2021-10-06 ENCOUNTER — OFFICE VISIT (OUTPATIENT)
Dept: ONCOLOGY | Facility: CLINIC | Age: 66
End: 2021-10-06

## 2021-10-06 ENCOUNTER — LAB (OUTPATIENT)
Dept: ONCOLOGY | Facility: HOSPITAL | Age: 66
End: 2021-10-06

## 2021-10-06 VITALS
TEMPERATURE: 98.9 F | BODY MASS INDEX: 26.97 KG/M2 | HEART RATE: 93 BPM | SYSTOLIC BLOOD PRESSURE: 145 MMHG | WEIGHT: 198.9 LBS | RESPIRATION RATE: 18 BRPM | OXYGEN SATURATION: 98 % | DIASTOLIC BLOOD PRESSURE: 78 MMHG

## 2021-10-06 DIAGNOSIS — D64.9 ANEMIA, UNSPECIFIED TYPE: ICD-10-CM

## 2021-10-06 DIAGNOSIS — D75.839 THROMBOCYTOSIS: ICD-10-CM

## 2021-10-06 DIAGNOSIS — R53.83 OTHER FATIGUE: Primary | ICD-10-CM

## 2021-10-06 DIAGNOSIS — D46.9 MDS/MPN (MYELODYSPLASTIC/MYELOPROLIFERATIVE NEOPLASMS) (HCC): ICD-10-CM

## 2021-10-06 DIAGNOSIS — R53.83 OTHER FATIGUE: ICD-10-CM

## 2021-10-06 DIAGNOSIS — D46.9 MDS/MPN (MYELODYSPLASTIC/MYELOPROLIFERATIVE NEOPLASMS) (HCC): Primary | ICD-10-CM

## 2021-10-06 LAB
ANISOCYTOSIS BLD QL: ABNORMAL
BASOPHILS # BLD MANUAL: 0.2 10*3/MM3 (ref 0–0.2)
BASOPHILS NFR BLD AUTO: 3 % (ref 0–1.5)
DACRYOCYTES BLD QL SMEAR: ABNORMAL
DEPRECATED RDW RBC AUTO: 95.2 FL (ref 37–54)
EOSINOPHIL # BLD MANUAL: 0.13 10*3/MM3 (ref 0–0.4)
EOSINOPHIL NFR BLD MANUAL: 2 % (ref 0.3–6.2)
ERYTHROCYTE [DISTWIDTH] IN BLOOD BY AUTOMATED COUNT: 23.4 % (ref 12.3–15.4)
HCT VFR BLD AUTO: 29.5 % (ref 37.5–51)
HGB BLD-MCNC: 10.4 G/DL (ref 13–17.7)
LYMPHOCYTES # BLD MANUAL: 2.96 10*3/MM3 (ref 0.7–3.1)
LYMPHOCYTES NFR BLD MANUAL: 44 % (ref 19.6–45.3)
LYMPHOCYTES NFR BLD MANUAL: 7 % (ref 5–12)
MACROCYTES BLD QL SMEAR: ABNORMAL
MCH RBC QN AUTO: 40.6 PG (ref 26.6–33)
MCHC RBC AUTO-ENTMCNC: 35.3 G/DL (ref 31.5–35.7)
MCV RBC AUTO: 115.2 FL (ref 79–97)
MONOCYTES # BLD AUTO: 0.46 10*3/MM3 (ref 0.1–0.9)
NEUTROPHILS # BLD AUTO: 2.83 10*3/MM3 (ref 1.7–7)
NEUTROPHILS NFR BLD MANUAL: 37 % (ref 42.7–76)
NEUTS BAND NFR BLD MANUAL: 6 % (ref 0–5)
OVALOCYTES BLD QL SMEAR: ABNORMAL
PLATELET # BLD AUTO: 674 10*3/MM3 (ref 140–450)
PMV BLD AUTO: 10.6 FL (ref 6–12)
POLYCHROMASIA BLD QL SMEAR: ABNORMAL
RBC # BLD AUTO: 2.56 10*6/MM3 (ref 4.14–5.8)
SMALL PLATELETS BLD QL SMEAR: ABNORMAL
VARIANT LYMPHS NFR BLD MANUAL: 1 % (ref 0–5)
WBC # BLD AUTO: 6.57 10*3/MM3 (ref 3.4–10.8)
WBC MORPH BLD: NORMAL

## 2021-10-06 PROCEDURE — 85025 COMPLETE CBC W/AUTO DIFF WBC: CPT

## 2021-10-06 PROCEDURE — 82306 VITAMIN D 25 HYDROXY: CPT | Performed by: INTERNAL MEDICINE

## 2021-10-06 PROCEDURE — 1126F AMNT PAIN NOTED NONE PRSNT: CPT | Performed by: INTERNAL MEDICINE

## 2021-10-06 PROCEDURE — 85007 BL SMEAR W/DIFF WBC COUNT: CPT

## 2021-10-06 PROCEDURE — 99214 OFFICE O/P EST MOD 30 MIN: CPT | Performed by: INTERNAL MEDICINE

## 2021-10-06 PROCEDURE — 1123F ACP DISCUSS/DSCN MKR DOCD: CPT | Performed by: INTERNAL MEDICINE

## 2021-10-06 PROCEDURE — G0463 HOSPITAL OUTPT CLINIC VISIT: HCPCS | Performed by: INTERNAL MEDICINE

## 2021-10-06 NOTE — PROGRESS NOTES
DATE OF VISIT: 10/6/2021      REASON FOR VISIT: MPN/MDS overlap syndrome, anemia      HISTORY OF PRESENT ILLNESS:   66-year-old male with medical problem consisting of hypertension, MPN/MDS overlap syndrome for which patient is currently on hydroxyurea, anemia, thrombocytosis is here for follow-up appointment today.  Denies any excessive nausea or vomiting or diarrhea.  Complains of fatigue.  States she did not fill last week due to worsening fatigue and held his Hydrea on Sunday.  Has been having some sinus drainage issue recently.  Denies any ankle ulceration.  Denies any bleeding.  Denies any new lymph node enlargement.  Denies any recent infection or hospitalization.        Oncology history:    1.  MPN/MDS overlap syndrome:  -Bone marrow biopsy done at [place] on March 23, 2020 is consistent with MPN/MDS overlap syndrome.  - Patient was on hydroxyurea until September 17, 2020 due to severe anemia with hemoglobin of 7.3 and neutropenia with absolute neutrophil count of 1.45 hydroxyurea has been discontinued.  - CBC done on September 25, 2020 shows white blood cell count is 3.97, hemoglobin is 7.7, platelet count is 432,000.     -Hydroxyurea was discontinued on September 17 2020.  -Hydroxyurea was started back on October 2, 2020 at dose of 500 mg p.o. daily.  - dose of hydroxyurea was increased to 1500 mg p.o. daily on November 20, 2020.  -Due to worsening cytopenia, dose of hydroxyurea was changed to 1500 mg p.o. on Monday Wednesday Friday and rest of the week 1000 mg on January 8, 2021  -Dose of hydroxyurea was changed to 1500 mg p.o. daily on Monday Wednesday and Friday and rest of the week 1000 mg p.o. daily on July 14, 2021              Past Medical History, Past Surgical History, Social History, Family History have been reviewed and are without significant changes except as mentioned.    Review of Systems   A comprehensive 14 point review of systems was performed and was negative except as mentioned in  HPI.    Medications:  The current medication list was reviewed in the EMR    ALLERGIES:    Allergies   Allergen Reactions   • Other Rash     Pt was working with walnut wood and broke out in a rash   • Tetracycline Rash       Objective      Vitals:    10/06/21 0954   BP: 145/78   Pulse: 93   Resp: 18   Temp: 98.9 °F (37.2 °C)   SpO2: 98%   Weight: 90.2 kg (198 lb 14.4 oz)   PainSc: 0-No pain     Current Status 7/14/2021   ECOG score 0       Physical Exam  Pulmonary:      Breath sounds: Normal breath sounds.   Neurological:      Mental Status: He is alert and oriented to person, place, and time.           RECENT LABS:  Glucose   Date Value Ref Range Status   08/25/2021 134 (H) 65 - 99 mg/dL Final     Sodium   Date Value Ref Range Status   08/25/2021 139 136 - 145 mmol/L Final     Potassium   Date Value Ref Range Status   08/25/2021 4.1 3.5 - 5.2 mmol/L Final     CO2   Date Value Ref Range Status   08/25/2021 26.0 22.0 - 29.0 mmol/L Final     Chloride   Date Value Ref Range Status   08/25/2021 103 98 - 107 mmol/L Final     Anion Gap   Date Value Ref Range Status   08/25/2021 10.0 5.0 - 15.0 mmol/L Final     Creatinine   Date Value Ref Range Status   08/25/2021 0.89 0.76 - 1.27 mg/dL Final     BUN   Date Value Ref Range Status   08/25/2021 13 8 - 23 mg/dL Final     BUN/Creatinine Ratio   Date Value Ref Range Status   08/25/2021 14.6 7.0 - 25.0 Final     Calcium   Date Value Ref Range Status   08/25/2021 9.3 8.6 - 10.5 mg/dL Final     eGFR Non  Amer   Date Value Ref Range Status   08/25/2021 86 >60 mL/min/1.73 Final     Alkaline Phosphatase   Date Value Ref Range Status   08/25/2021 56 39 - 117 U/L Final     Total Protein   Date Value Ref Range Status   08/25/2021 7.0 6.0 - 8.5 g/dL Final     ALT (SGPT)   Date Value Ref Range Status   08/25/2021 26 1 - 41 U/L Final     AST (SGOT)   Date Value Ref Range Status   08/25/2021 22 1 - 40 U/L Final     Total Bilirubin   Date Value Ref Range Status   08/25/2021 0.7 0.0 -  1.2 mg/dL Final     Albumin   Date Value Ref Range Status   08/25/2021 4.60 3.50 - 5.20 g/dL Final     Globulin   Date Value Ref Range Status   08/25/2021 2.4 gm/dL Final     Lab Results   Component Value Date    WBC 6.57 10/06/2021    HGB 10.4 (L) 10/06/2021    HCT 29.5 (L) 10/06/2021    .2 (H) 10/06/2021     (H) 10/06/2021     Lab Results   Component Value Date    NEUTROABS 2.83 10/06/2021    IRON 109 05/28/2020    IRON 84 02/26/2020    TIBC 301 05/28/2020    TIBC 375 02/26/2020    LABIRON 36 05/28/2020    LABIRON 22 02/26/2020    FERRITIN 687.00 (H) 05/28/2020    FERRITIN 684 (H) 03/23/2020    FERRITIN 776.00 (H) 02/26/2020    [id number] >2,000 (H) 05/28/2020    FOLATE 11.70 05/28/2020     No results found for: , LABCA2, AFPTM, HCGQUANT, , CHROMGRNA, [id number], CEA, REFLABREPO      PATHOLOGY:  * Cannot find OR log *         RADIOLOGY DATA :  No radiology results for the last 7 days        Assessment/Plan     1.  MPN/MDS overlap syndrome  -Review oncology history for prior treatment details  -Patient is currently on hydroxyurea 1500 mg p.o. daily on Monday, Wednesday and Friday.  Rest of the week he is taking 1000 mg p.o. daily  -CBC done earlier today shows white blood cell count is 6.57, hemoglobin is 10.4, platelet count is 674,000.  Due to anemia there is no room to go up on hydroxyurea to control platelet count better which was discussed with patient  -Recommend following up with Dr. Arredondo to look for any other treatment option including experimental medication or clinical trial.  -We will continue with clinical monitoring.  -We will ask patient to have a repeat CBC done in 3 weeks from now.  -We will ask patient to return to clinic in 6 weeks with repeat CBC and CMP on that day    2.  Anemia and thrombocytosis:  -Secondary to MPN/MDS overlap syndrome plus hydroxyurea  -We will monitor with CBC    3.  Health maintenance: Patient does not smoke    4. Advance Care Planning: For now  patient remains full code and is able to make decisions.  Patient has health care surrogate mentioned on chart.                 PHQ-9 Total Score: 0   -Patient is not homicidal or suicidal.  No acute intervention required.    Marv Messina reports a pain score of 0.  Given his pain assessment as noted, treatment options were discussed and the following options were decided upon as a follow-up plan to address the patient's pain: No acute intervention required.         Vadim Song MD  10/6/2021  10:41 CDT        Part of this note may be an electronic transcription/translation of spoken language to printed text using the Dragon Dictation System.          CC:

## 2021-10-07 LAB — 25(OH)D3 SERPL-MCNC: 20.1 NG/ML

## 2021-10-08 ENCOUNTER — TELEPHONE (OUTPATIENT)
Dept: ONCOLOGY | Facility: HOSPITAL | Age: 66
End: 2021-10-08

## 2021-10-08 NOTE — TELEPHONE ENCOUNTER
----- Message from Vadim Song MD sent at 10/8/2021  6:27 AM CDT -----  Please let patient know vitamin D level is lower than normal.  Recommend starting vitamin D 1000 international unit p.o. daily.  Thank you

## 2021-10-27 ENCOUNTER — TRANSCRIBE ORDERS (OUTPATIENT)
Dept: LAB | Facility: HOSPITAL | Age: 66
End: 2021-10-27

## 2021-10-27 ENCOUNTER — LAB (OUTPATIENT)
Dept: LAB | Facility: HOSPITAL | Age: 66
End: 2021-10-27

## 2021-10-27 DIAGNOSIS — D64.9 ANEMIA, UNSPECIFIED TYPE: ICD-10-CM

## 2021-10-27 DIAGNOSIS — R73.09 IMPAIRED GLUCOSE TOLERANCE TEST: Primary | ICD-10-CM

## 2021-10-27 DIAGNOSIS — R53.83 FATIGUE, UNSPECIFIED TYPE: ICD-10-CM

## 2021-10-27 DIAGNOSIS — R73.09 IMPAIRED GLUCOSE TOLERANCE TEST: ICD-10-CM

## 2021-10-27 DIAGNOSIS — D46.9 MDS/MPN (MYELODYSPLASTIC/MYELOPROLIFERATIVE NEOPLASMS) (HCC): ICD-10-CM

## 2021-10-27 DIAGNOSIS — D75.839 THROMBOCYTOSIS: ICD-10-CM

## 2021-10-27 LAB
ANISOCYTOSIS BLD QL: ABNORMAL
DEPRECATED RDW RBC AUTO: 99.6 FL (ref 37–54)
EOSINOPHIL # BLD MANUAL: 0.16 10*3/MM3 (ref 0–0.4)
EOSINOPHIL NFR BLD MANUAL: 3 % (ref 0.3–6.2)
ERYTHROCYTE [DISTWIDTH] IN BLOOD BY AUTOMATED COUNT: 23.5 % (ref 12.3–15.4)
HCT VFR BLD AUTO: 29.8 % (ref 37.5–51)
HGB BLD-MCNC: 10.2 G/DL (ref 13–17.7)
LYMPHOCYTES # BLD MANUAL: 1.9 10*3/MM3 (ref 0.7–3.1)
LYMPHOCYTES NFR BLD MANUAL: 35 % (ref 19.6–45.3)
LYMPHOCYTES NFR BLD MANUAL: 8 % (ref 5–12)
MACROCYTES BLD QL SMEAR: ABNORMAL
MCH RBC QN AUTO: 41 PG (ref 26.6–33)
MCHC RBC AUTO-ENTMCNC: 34.2 G/DL (ref 31.5–35.7)
MCV RBC AUTO: 119.7 FL (ref 79–97)
MONOCYTES # BLD AUTO: 0.43 10*3/MM3 (ref 0.1–0.9)
NEUTROPHILS # BLD AUTO: 2.93 10*3/MM3 (ref 1.7–7)
NEUTROPHILS NFR BLD MANUAL: 46 % (ref 42.7–76)
NEUTS BAND NFR BLD MANUAL: 8 % (ref 0–5)
NRBC SPEC MANUAL: 2 /100 WBC (ref 0–0.2)
PLATELET # BLD AUTO: 674 10*3/MM3 (ref 140–450)
PMV BLD AUTO: 10.2 FL (ref 6–12)
RBC # BLD AUTO: 2.49 10*6/MM3 (ref 4.14–5.8)
SMALL PLATELETS BLD QL SMEAR: ABNORMAL
TSH SERPL DL<=0.05 MIU/L-ACNC: 1.48 UIU/ML (ref 0.27–4.2)
VIT B12 BLD-MCNC: 1160 PG/ML (ref 211–946)
WBC # BLD AUTO: 5.43 10*3/MM3 (ref 3.4–10.8)
WBC MORPH BLD: NORMAL

## 2021-10-27 PROCEDURE — 82607 VITAMIN B-12: CPT

## 2021-10-27 PROCEDURE — 83036 HEMOGLOBIN GLYCOSYLATED A1C: CPT | Performed by: FAMILY MEDICINE

## 2021-10-27 PROCEDURE — 36415 COLL VENOUS BLD VENIPUNCTURE: CPT | Performed by: FAMILY MEDICINE

## 2021-10-27 PROCEDURE — 84443 ASSAY THYROID STIM HORMONE: CPT | Performed by: FAMILY MEDICINE

## 2021-10-27 PROCEDURE — 85025 COMPLETE CBC W/AUTO DIFF WBC: CPT

## 2021-10-27 PROCEDURE — 85007 BL SMEAR W/DIFF WBC COUNT: CPT

## 2021-10-28 ENCOUNTER — TELEPHONE (OUTPATIENT)
Dept: ONCOLOGY | Facility: HOSPITAL | Age: 66
End: 2021-10-28

## 2021-10-28 LAB — HBA1C MFR BLD: 4.86 % (ref 4.8–5.6)

## 2021-10-28 NOTE — TELEPHONE ENCOUNTER
----- Message from Vadim Song MD sent at 10/27/2021 12:20 PM CDT -----  Please let patient know, hemoglobin is 10.2, platelet count is 674,000.  Recommend continue with same dose of hydroxyurea.  Thank you   [employer] dose 1 and 2

## 2021-10-28 NOTE — TELEPHONE ENCOUNTER
Called and spoke with pt regarding lab results and medication instruction as per Dr. Song, v/u obtained.

## 2021-11-15 RX ORDER — HYDROXYUREA 500 MG/1
CAPSULE ORAL
Qty: 90 CAPSULE | Refills: 1 | Status: SHIPPED | OUTPATIENT
Start: 2021-11-15 | End: 2021-12-22 | Stop reason: SDUPTHER

## 2021-11-15 NOTE — TELEPHONE ENCOUNTER
Rx Refill Note  Requested Prescriptions     Pending Prescriptions Disp Refills   • hydroxyurea (HYDREA) 500 MG capsule 90 capsule 1     Sig: Take 3 capsule  daily      Last office visit with prescribing clinician: 10/6/2021      Next office visit with prescribing clinician: 11/17/2021            Nova Herrera RN  11/15/21, 08:11 CST

## 2021-11-17 ENCOUNTER — LAB (OUTPATIENT)
Dept: ONCOLOGY | Facility: HOSPITAL | Age: 66
End: 2021-11-17

## 2021-11-17 ENCOUNTER — OFFICE VISIT (OUTPATIENT)
Dept: ONCOLOGY | Facility: CLINIC | Age: 66
End: 2021-11-17

## 2021-11-17 VITALS
TEMPERATURE: 97.9 F | OXYGEN SATURATION: 98 % | BODY MASS INDEX: 27.25 KG/M2 | WEIGHT: 201 LBS | DIASTOLIC BLOOD PRESSURE: 80 MMHG | SYSTOLIC BLOOD PRESSURE: 161 MMHG | HEART RATE: 81 BPM

## 2021-11-17 DIAGNOSIS — D75.839 THROMBOCYTOSIS: ICD-10-CM

## 2021-11-17 DIAGNOSIS — D64.9 ANEMIA, UNSPECIFIED TYPE: ICD-10-CM

## 2021-11-17 DIAGNOSIS — D46.9 MDS/MPN (MYELODYSPLASTIC/MYELOPROLIFERATIVE NEOPLASMS) (HCC): Primary | ICD-10-CM

## 2021-11-17 DIAGNOSIS — D46.9 MDS/MPN (MYELODYSPLASTIC/MYELOPROLIFERATIVE NEOPLASMS) (HCC): ICD-10-CM

## 2021-11-17 LAB
ANISOCYTOSIS BLD QL: ABNORMAL
BASOPHILS # BLD MANUAL: 0.06 10*3/MM3 (ref 0–0.2)
BASOPHILS NFR BLD AUTO: 1 % (ref 0–1.5)
DEPRECATED RDW RBC AUTO: 96.1 FL (ref 37–54)
EOSINOPHIL # BLD MANUAL: 0.24 10*3/MM3 (ref 0–0.4)
EOSINOPHIL NFR BLD MANUAL: 4 % (ref 0.3–6.2)
ERYTHROCYTE [DISTWIDTH] IN BLOOD BY AUTOMATED COUNT: 22.5 % (ref 12.3–15.4)
HCT VFR BLD AUTO: 29.7 % (ref 37.5–51)
HGB BLD-MCNC: 10.3 G/DL (ref 13–17.7)
LYMPHOCYTES # BLD MANUAL: 2.56 10*3/MM3 (ref 0.7–3.1)
LYMPHOCYTES NFR BLD MANUAL: 3 % (ref 5–12)
LYMPHOCYTES NFR BLD MANUAL: 41 % (ref 19.6–45.3)
MACROCYTES BLD QL SMEAR: ABNORMAL
MCH RBC QN AUTO: 41.5 PG (ref 26.6–33)
MCHC RBC AUTO-ENTMCNC: 34.7 G/DL (ref 31.5–35.7)
MCV RBC AUTO: 119.8 FL (ref 79–97)
MONOCYTES # BLD AUTO: 0.18 10*3/MM3 (ref 0.1–0.9)
NEUTROPHILS # BLD AUTO: 3.05 10*3/MM3 (ref 1.7–7)
NEUTROPHILS NFR BLD MANUAL: 46 % (ref 42.7–76)
NEUTS BAND NFR BLD MANUAL: 4 % (ref 0–5)
OVALOCYTES BLD QL SMEAR: ABNORMAL
PLATELET # BLD AUTO: 564 10*3/MM3 (ref 140–450)
PMV BLD AUTO: 10.6 FL (ref 6–12)
POLYCHROMASIA BLD QL SMEAR: ABNORMAL
RBC # BLD AUTO: 2.48 10*6/MM3 (ref 4.14–5.8)
SMALL PLATELETS BLD QL SMEAR: ABNORMAL
VARIANT LYMPHS NFR BLD MANUAL: 1 % (ref 0–5)
WBC # BLD AUTO: 6.09 10*3/MM3 (ref 3.4–10.8)
WBC MORPH BLD: NORMAL

## 2021-11-17 PROCEDURE — 1123F ACP DISCUSS/DSCN MKR DOCD: CPT | Performed by: INTERNAL MEDICINE

## 2021-11-17 PROCEDURE — G0463 HOSPITAL OUTPT CLINIC VISIT: HCPCS | Performed by: INTERNAL MEDICINE

## 2021-11-17 PROCEDURE — 85025 COMPLETE CBC W/AUTO DIFF WBC: CPT

## 2021-11-17 PROCEDURE — 99214 OFFICE O/P EST MOD 30 MIN: CPT | Performed by: INTERNAL MEDICINE

## 2021-11-17 PROCEDURE — 1126F AMNT PAIN NOTED NONE PRSNT: CPT | Performed by: INTERNAL MEDICINE

## 2021-11-17 PROCEDURE — 85007 BL SMEAR W/DIFF WBC COUNT: CPT

## 2021-11-17 NOTE — PROGRESS NOTES
DATE OF VISIT: 11/17/2021      REASON FOR VISIT: MPN/MDS overlap syndrome, anemia, thrombocytosis      HISTORY OF PRESENT ILLNESS:   66-year-old male with medical problem consisting of hypertension, MPN/MDS overlap syndrome for which patient is currently on hydroxyurea, anemia, thrombocytosis is here for follow-up appointment today.  Denies any excessive nausea vomiting or diarrhea.  Complains of fatigue.  Denies any ankle ulceration.  Denies any new chest pain or shortness of breath.  Denies any bleeding.            Oncology history:    1.  MPN/MDS overlap syndrome:  -Bone marrow biopsy done at [place] on March 23, 2020 is consistent with MPN/MDS overlap syndrome.  - Patient was on hydroxyurea until September 17, 2020 due to severe anemia with hemoglobin of 7.3 and neutropenia with absolute neutrophil count of 1.45 hydroxyurea has been discontinued.  - CBC done on September 25, 2020 shows white blood cell count is 3.97, hemoglobin is 7.7, platelet count is 432,000.     -Hydroxyurea was discontinued on September 17 2020.  -Hydroxyurea was started back on October 2, 2020 at dose of 500 mg p.o. daily.  - dose of hydroxyurea was increased to 1500 mg p.o. daily on November 20, 2020.  -Due to worsening cytopenia, dose of hydroxyurea was changed to 1500 mg p.o. on Monday Wednesday Friday and rest of the week 1000 mg on January 8, 2021  -Dose of hydroxyurea was changed to 1500 mg p.o. daily on Monday Wednesday and Friday and rest of the week 1000 mg p.o. daily on July 14, 2021          Past Medical History, Past Surgical History, Social History, Family History have been reviewed and are without significant changes except as mentioned.    Review of Systems   A comprehensive 14 point review of systems was performed and was negative except as mentioned in HPI.    Medications:  The current medication list was reviewed in the EMR    ALLERGIES:    Allergies   Allergen Reactions   • Other Rash     Pt was working with [employer] and broke out in a rash   • Tetracycline Rash       Objective      Vitals:    11/17/21 1016   BP: 161/80   Pulse: 81   Temp: 97.9 °F (36.6 °C)   SpO2: 98%   Weight: 91.2 kg (201 lb)   PainSc: 0-No pain     Current Status 7/14/2021   ECOG score 0       Physical Exam  Pulmonary:      Breath sounds: Normal breath sounds.   Neurological:      Mental Status: He is alert and oriented to person, place, and time.           RECENT LABS:  Glucose   Date Value Ref Range Status   08/25/2021 134 (H) 65 - 99 mg/dL Final     Sodium   Date Value Ref Range Status   08/25/2021 139 136 - 145 mmol/L Final     Potassium   Date Value Ref Range Status   08/25/2021 4.1 3.5 - 5.2 mmol/L Final     CO2   Date Value Ref Range Status   08/25/2021 26.0 22.0 - 29.0 mmol/L Final     Chloride   Date Value Ref Range Status   08/25/2021 103 98 - 107 mmol/L Final     Anion Gap   Date Value Ref Range Status   08/25/2021 10.0 5.0 - 15.0 mmol/L Final     Creatinine   Date Value Ref Range Status   08/25/2021 0.89 0.76 - 1.27 mg/dL Final     BUN   Date Value Ref Range Status   08/25/2021 13 8 - 23 mg/dL Final     BUN/Creatinine Ratio   Date Value Ref Range Status   08/25/2021 14.6 7.0 - 25.0 Final     Calcium   Date Value Ref Range Status   08/25/2021 9.3 8.6 - 10.5 mg/dL Final     eGFR Non  Amer   Date Value Ref Range Status   08/25/2021 86 >60 mL/min/1.73 Final     Alkaline Phosphatase   Date Value Ref Range Status   08/25/2021 56 39 - 117 U/L Final     Total Protein   Date Value Ref Range Status   08/25/2021 7.0 6.0 - 8.5 g/dL Final     ALT (SGPT)   Date Value Ref Range Status   08/25/2021 26 1 - 41 U/L Final     AST (SGOT)   Date Value Ref Range Status   08/25/2021 22 1 - 40 U/L Final     Total Bilirubin   Date Value Ref Range Status   08/25/2021 0.7 0.0 - 1.2 mg/dL Final     Albumin   Date Value Ref Range Status   08/25/2021 4.60 3.50 - 5.20 g/dL Final     Globulin   Date Value Ref Range Status   08/25/2021 2.4 gm/dL Final     Lab Results    Component Value Date    WBC 6.09 11/17/2021    HGB 10.3 (L) 11/17/2021    HCT 29.7 (L) 11/17/2021    .8 (H) 11/17/2021     (H) 11/17/2021     Lab Results   Component Value Date    NEUTROABS 2.93 10/27/2021    IRON 109 05/28/2020    IRON 84 02/26/2020    TIBC 301 05/28/2020    TIBC 375 02/26/2020    LABIRON 36 05/28/2020    LABIRON 22 02/26/2020    FERRITIN 687.00 (H) 05/28/2020    FERRITIN 684 (H) 03/23/2020    FERRITIN 776.00 (H) 02/26/2020    [id number] 1,160 (H) 10/27/2021    EUWBQYSL65 >2,000 (H) 05/28/2020    FOLATE 11.70 05/28/2020     No results found for: , LABCA2, AFPTM, HCGQUANT, , CHROMGRNA, [id number], CEA, REFLABREPO      PATHOLOGY:  * Cannot find OR log *         RADIOLOGY DATA :  No radiology results for the last 7 days        Assessment/Plan     1.  MPN/MDS overlap syndrome  -Review oncology history for prior treatment details  -Patient remains on hydroxyurea 1500 mg p.o. on Monday Wednesday and Friday, rest of the week patient is taking 1000 mg p.o. daily.  -Patient has not been able to tolerate any higher dose of hydroxyurea secondary to development of significant anemia.  -CBC done today shows white blood cell count is 6.09, hemoglobin is 10.3, platelet count is 564,000.  -Case was discussed with Dr. Arredondo over the phone last month.  -He is awaiting patient to have a follow-up appointment with him to discuss any further treatment option at [place].  -Patient was encouraged to follow-up with Dr. Arredondo at [place] to explore any further treatment options.  -We will continue with clinical monitoring  -We will ask patient to return to clinic in 4 weeks with repeat CBC to be done on that day  -We will repeat CBC and CMP upon next clinic visit in 8 weeks    2.  Anemia and thrombocytosis:  -Secondary to MPN/MDS overlap syndrome plus hydroxyurea  -We will monitor with CBC      3.  Health maintenance: Patient does not smoke    4. Advance Care Planning: For now  patient remains full code and is able to make decisions.  Patient has health care surrogate mentioned on chart.    5.  Prescriptions: Patient has enough prescription for hydroxyurea               PHQ-9 Total Score: 0   -Patient is not homicidal or suicidal.  No acute intervention required.    Marv Messina reports a pain score of 0.  Given his pain assessment as noted, treatment options were discussed and the following options were decided upon as a follow-up plan to address the patient's pain: No acute intervention required.         Vadim Song MD  11/17/2021  10:53 CST        Part of this note may be an electronic transcription/translation of spoken language to printed text using the Dragon Dictation System.          CC:

## 2021-12-15 ENCOUNTER — TELEPHONE (OUTPATIENT)
Dept: ONCOLOGY | Facility: HOSPITAL | Age: 66
End: 2021-12-15

## 2021-12-15 ENCOUNTER — LAB (OUTPATIENT)
Dept: ONCOLOGY | Facility: HOSPITAL | Age: 66
End: 2021-12-15

## 2021-12-15 DIAGNOSIS — D64.9 ANEMIA, UNSPECIFIED TYPE: ICD-10-CM

## 2021-12-15 DIAGNOSIS — D46.9 MDS/MPN (MYELODYSPLASTIC/MYELOPROLIFERATIVE NEOPLASMS) (HCC): ICD-10-CM

## 2021-12-15 DIAGNOSIS — D75.839 THROMBOCYTOSIS: ICD-10-CM

## 2021-12-15 LAB
ANISOCYTOSIS BLD QL: ABNORMAL
BASOPHILS # BLD MANUAL: 0.05 10*3/MM3 (ref 0–0.2)
BASOPHILS NFR BLD MANUAL: 1 % (ref 0–1.5)
DEPRECATED RDW RBC AUTO: 91.3 FL (ref 37–54)
EOSINOPHIL # BLD MANUAL: 0.1 10*3/MM3 (ref 0–0.4)
EOSINOPHIL NFR BLD MANUAL: 2 % (ref 0.3–6.2)
ERYTHROCYTE [DISTWIDTH] IN BLOOD BY AUTOMATED COUNT: 22.3 % (ref 12.3–15.4)
HCT VFR BLD AUTO: 27.2 % (ref 37.5–51)
HGB BLD-MCNC: 9.3 G/DL (ref 13–17.7)
LYMPHOCYTES # BLD MANUAL: 1.69 10*3/MM3 (ref 0.7–3.1)
LYMPHOCYTES NFR BLD MANUAL: 6 % (ref 5–12)
MACROCYTES BLD QL SMEAR: ABNORMAL
MCH RBC QN AUTO: 40.4 PG (ref 26.6–33)
MCHC RBC AUTO-ENTMCNC: 34.2 G/DL (ref 31.5–35.7)
MCV RBC AUTO: 118.3 FL (ref 79–97)
MONOCYTES # BLD: 0.3 10*3/MM3 (ref 0.1–0.9)
NEUTROPHILS # BLD AUTO: 2.83 10*3/MM3 (ref 1.7–7)
NEUTROPHILS NFR BLD MANUAL: 50 % (ref 42.7–76)
NEUTS BAND NFR BLD MANUAL: 7 % (ref 0–5)
NRBC SPEC MANUAL: 1 /100 WBC (ref 0–0.2)
OVALOCYTES BLD QL SMEAR: ABNORMAL
PLATELET # BLD AUTO: 587 10*3/MM3 (ref 140–450)
PMV BLD AUTO: 10.7 FL (ref 6–12)
RBC # BLD AUTO: 2.3 10*6/MM3 (ref 4.14–5.8)
SMALL PLATELETS BLD QL SMEAR: ABNORMAL
VARIANT LYMPHS NFR BLD MANUAL: 1 % (ref 0–5)
VARIANT LYMPHS NFR BLD MANUAL: 33 % (ref 19.6–45.3)
WBC MORPH BLD: NORMAL
WBC NRBC COR # BLD: 4.96 10*3/MM3 (ref 3.4–10.8)

## 2021-12-15 PROCEDURE — 85007 BL SMEAR W/DIFF WBC COUNT: CPT

## 2021-12-15 PROCEDURE — 36415 COLL VENOUS BLD VENIPUNCTURE: CPT | Performed by: INTERNAL MEDICINE

## 2021-12-15 PROCEDURE — 85025 COMPLETE CBC W/AUTO DIFF WBC: CPT

## 2021-12-15 NOTE — TELEPHONE ENCOUNTER
----- Message from Vadim Song MD sent at 12/15/2021 11:10 AM CST -----  Please let patient know, hemoglobin today is 9.3, platelet count is 587,000.  Recommend continue with same dose of hydroxyurea for now.  Thank you

## 2021-12-15 NOTE — TELEPHONE ENCOUNTER
Contacted pt regarding labs. Denies any further questions and verbalizes understanding.    verbalizes understanding/needs met

## 2021-12-23 RX ORDER — HYDROXYUREA 500 MG/1
CAPSULE ORAL
Qty: 90 CAPSULE | Refills: 1 | Status: SHIPPED | OUTPATIENT
Start: 2021-12-23 | End: 2022-02-19 | Stop reason: SDUPTHER

## 2021-12-23 NOTE — TELEPHONE ENCOUNTER
Rx Refill Note  Requested Prescriptions     Pending Prescriptions Disp Refills   • hydroxyurea (HYDREA) 500 MG capsule 90 capsule 1     Sig: Take 3 capsule  daily      Last office visit with prescribing clinician: 11/17/2021      Next office visit with prescribing clinician: 1/12/2022            Janee Ruff RN  12/23/21, 08:09 CST

## 2022-01-07 DIAGNOSIS — D46.9 MDS/MPN (MYELODYSPLASTIC/MYELOPROLIFERATIVE NEOPLASMS): Primary | ICD-10-CM

## 2022-01-12 ENCOUNTER — LAB (OUTPATIENT)
Dept: ONCOLOGY | Facility: HOSPITAL | Age: 67
End: 2022-01-12

## 2022-01-12 ENCOUNTER — OFFICE VISIT (OUTPATIENT)
Dept: ONCOLOGY | Facility: CLINIC | Age: 67
End: 2022-01-12

## 2022-01-12 VITALS
OXYGEN SATURATION: 100 % | SYSTOLIC BLOOD PRESSURE: 161 MMHG | BODY MASS INDEX: 27.58 KG/M2 | TEMPERATURE: 98.3 F | WEIGHT: 203.4 LBS | DIASTOLIC BLOOD PRESSURE: 65 MMHG | HEART RATE: 83 BPM

## 2022-01-12 DIAGNOSIS — D64.9 ANEMIA, UNSPECIFIED TYPE: Chronic | ICD-10-CM

## 2022-01-12 DIAGNOSIS — Z79.64 ENCOUNTER FOR MONITORING OF HYDROXYUREA THERAPY: Chronic | ICD-10-CM

## 2022-01-12 DIAGNOSIS — D75.839 THROMBOCYTOSIS: Chronic | ICD-10-CM

## 2022-01-12 DIAGNOSIS — Z51.81 ENCOUNTER FOR MONITORING OF HYDROXYUREA THERAPY: Chronic | ICD-10-CM

## 2022-01-12 DIAGNOSIS — D46.9 MDS/MPN (MYELODYSPLASTIC/MYELOPROLIFERATIVE NEOPLASMS): Primary | Chronic | ICD-10-CM

## 2022-01-12 DIAGNOSIS — D46.9 MDS/MPN (MYELODYSPLASTIC/MYELOPROLIFERATIVE NEOPLASMS): ICD-10-CM

## 2022-01-12 DIAGNOSIS — R53.83 OTHER FATIGUE: Chronic | ICD-10-CM

## 2022-01-12 LAB
ALBUMIN SERPL-MCNC: 5.1 G/DL (ref 3.5–5.2)
ALBUMIN/GLOB SERPL: 2.6 G/DL
ALP SERPL-CCNC: 55 U/L (ref 39–117)
ALT SERPL W P-5'-P-CCNC: 23 U/L (ref 1–41)
ANION GAP SERPL CALCULATED.3IONS-SCNC: 8 MMOL/L (ref 5–15)
ANISOCYTOSIS BLD QL: NORMAL
AST SERPL-CCNC: 23 U/L (ref 1–40)
BASOPHILS # BLD AUTO: 0.06 10*3/MM3 (ref 0–0.2)
BASOPHILS NFR BLD AUTO: 1.2 % (ref 0–1.5)
BILIRUB SERPL-MCNC: 0.8 MG/DL (ref 0–1.2)
BUN SERPL-MCNC: 14 MG/DL (ref 8–23)
BUN/CREAT SERPL: 16.7 (ref 7–25)
CALCIUM SPEC-SCNC: 9.4 MG/DL (ref 8.6–10.5)
CHLORIDE SERPL-SCNC: 103 MMOL/L (ref 98–107)
CO2 SERPL-SCNC: 28 MMOL/L (ref 22–29)
CREAT SERPL-MCNC: 0.84 MG/DL (ref 0.76–1.27)
DEPRECATED RDW RBC AUTO: 96.3 FL (ref 37–54)
EOSINOPHIL # BLD AUTO: 0.16 10*3/MM3 (ref 0–0.4)
EOSINOPHIL NFR BLD AUTO: 3.1 % (ref 0.3–6.2)
ERYTHROCYTE [DISTWIDTH] IN BLOOD BY AUTOMATED COUNT: 22.3 % (ref 12.3–15.4)
GFR SERPL CREATININE-BSD FRML MDRD: 92 ML/MIN/1.73
GLOBULIN UR ELPH-MCNC: 2 GM/DL
GLUCOSE SERPL-MCNC: 106 MG/DL (ref 65–99)
HCT VFR BLD AUTO: 29.3 % (ref 37.5–51)
HGB BLD-MCNC: 10 G/DL (ref 13–17.7)
HOLD SPECIMEN: NORMAL
IMM GRANULOCYTES # BLD AUTO: 0.05 10*3/MM3 (ref 0–0.05)
IMM GRANULOCYTES NFR BLD AUTO: 1 % (ref 0–0.5)
LYMPHOCYTES # BLD AUTO: 2.12 10*3/MM3 (ref 0.7–3.1)
LYMPHOCYTES NFR BLD AUTO: 40.8 % (ref 19.6–45.3)
MACROCYTES BLD QL SMEAR: NORMAL
MCH RBC QN AUTO: 42 PG (ref 26.6–33)
MCHC RBC AUTO-ENTMCNC: 34.1 G/DL (ref 31.5–35.7)
MCV RBC AUTO: 123.1 FL (ref 79–97)
MONOCYTES # BLD AUTO: 0.35 10*3/MM3 (ref 0.1–0.9)
MONOCYTES NFR BLD AUTO: 6.7 % (ref 5–12)
NEUTROPHILS NFR BLD AUTO: 2.46 10*3/MM3 (ref 1.7–7)
NEUTROPHILS NFR BLD AUTO: 47.2 % (ref 42.7–76)
NRBC BLD AUTO-RTO: 1.3 /100 WBC (ref 0–0.2)
OVALOCYTES BLD QL SMEAR: NORMAL
PLATELET # BLD AUTO: 596 10*3/MM3 (ref 140–450)
PMV BLD AUTO: 10.5 FL (ref 6–12)
POTASSIUM SERPL-SCNC: 4.3 MMOL/L (ref 3.5–5.2)
PROT SERPL-MCNC: 7.1 G/DL (ref 6–8.5)
RBC # BLD AUTO: 2.38 10*6/MM3 (ref 4.14–5.8)
SMALL PLATELETS BLD QL SMEAR: NORMAL
SODIUM SERPL-SCNC: 139 MMOL/L (ref 136–145)
WBC MORPH BLD: NORMAL
WBC NRBC COR # BLD: 5.2 10*3/MM3 (ref 3.4–10.8)

## 2022-01-12 PROCEDURE — 80053 COMPREHEN METABOLIC PANEL: CPT | Performed by: INTERNAL MEDICINE

## 2022-01-12 PROCEDURE — 99214 OFFICE O/P EST MOD 30 MIN: CPT | Performed by: INTERNAL MEDICINE

## 2022-01-12 PROCEDURE — 85007 BL SMEAR W/DIFF WBC COUNT: CPT | Performed by: INTERNAL MEDICINE

## 2022-01-12 PROCEDURE — 85025 COMPLETE CBC W/AUTO DIFF WBC: CPT | Performed by: INTERNAL MEDICINE

## 2022-01-12 PROCEDURE — 1123F ACP DISCUSS/DSCN MKR DOCD: CPT | Performed by: INTERNAL MEDICINE

## 2022-01-12 PROCEDURE — G0463 HOSPITAL OUTPT CLINIC VISIT: HCPCS | Performed by: INTERNAL MEDICINE

## 2022-01-12 PROCEDURE — 1126F AMNT PAIN NOTED NONE PRSNT: CPT | Performed by: INTERNAL MEDICINE

## 2022-01-12 RX ORDER — MELATONIN
5000 DAILY
COMMUNITY

## 2022-01-12 NOTE — PROGRESS NOTES
DATE OF VISIT: 1/12/2022      REASON FOR VISIT: MPN/MDS overlap syndrome, anemia, thrombocytosis      HISTORY OF PRESENT ILLNESS:   66-year-old male with medical problem consisting of hypertension, MPN/MDS overlap syndrome for which patient is currently on hydroxyurea, anemia, thrombocytosis is here for follow-up appointment today.  Complains of fatigue.  Denies any excessive nausea or vomiting or diarrhea.  Denies any ankle ulceration.  Denies any new chest pain or shortness of breath.  Denies any bleeding.        Oncology history:     1.  MPN/MDS overlap syndrome:  -Bone marrow biopsy done at [place] on March 23, 2020 is consistent with MPN/MDS overlap syndrome.  - Patient was on hydroxyurea until September 17, 2020 due to severe anemia with hemoglobin of 7.3 and neutropenia with absolute neutrophil count of 1.45 hydroxyurea has been discontinued.  - CBC done on September 25, 2020 shows white blood cell count is 3.97, hemoglobin is 7.7, platelet count is 432,000.     -Hydroxyurea was discontinued on September 17 2020.  -Hydroxyurea was started back on October 2, 2020 at dose of 500 mg p.o. daily.  - dose of hydroxyurea was increased to 1500 mg p.o. daily on November 20, 2020.  -Due to worsening cytopenia, dose of hydroxyurea was changed to 1500 mg p.o. on Monday Wednesday Friday and rest of the week 1000 mg on January 8, 2021  -Dose of hydroxyurea was changed to 1500 mg p.o. daily on Monday Wednesday and Friday and rest of the week 1000 mg p.o. daily on July 14, 2021              Past Medical History, Past Surgical History, Social History, Family History have been reviewed and are without significant changes except as mentioned.    Review of Systems   A comprehensive 14 point review of systems was performed and was negative except as mentioned in HPI.    Medications:  The current medication list was reviewed in the EMR    ALLERGIES:    Allergies   Allergen Reactions   • Other Rash     Pt was working with [employer] and broke out in a rash   • Tetracycline Rash       Objective      Vitals:    01/12/22 0946   BP: 161/65   Pulse: 83   Temp: 98.3 °F (36.8 °C)   TempSrc: Temporal   SpO2: 100%   Weight: 92.3 kg (203 lb 6.4 oz)   PainSc: 0-No pain     Current Status 7/14/2021   ECOG score 0       Physical Exam  Pulmonary:      Breath sounds: Normal breath sounds.   Neurological:      Mental Status: He is alert and oriented to person, place, and time.           RECENT LABS:  Glucose   Date Value Ref Range Status   01/12/2022 106 (H) 65 - 99 mg/dL Final     Sodium   Date Value Ref Range Status   01/12/2022 139 136 - 145 mmol/L Final     Potassium   Date Value Ref Range Status   01/12/2022 4.3 3.5 - 5.2 mmol/L Final     CO2   Date Value Ref Range Status   01/12/2022 28.0 22.0 - 29.0 mmol/L Final     Chloride   Date Value Ref Range Status   01/12/2022 103 98 - 107 mmol/L Final     Anion Gap   Date Value Ref Range Status   01/12/2022 8.0 5.0 - 15.0 mmol/L Final     Creatinine   Date Value Ref Range Status   01/12/2022 0.84 0.76 - 1.27 mg/dL Final     BUN   Date Value Ref Range Status   01/12/2022 14 8 - 23 mg/dL Final     BUN/Creatinine Ratio   Date Value Ref Range Status   01/12/2022 16.7 7.0 - 25.0 Final     Calcium   Date Value Ref Range Status   01/12/2022 9.4 8.6 - 10.5 mg/dL Final     eGFR Non  Amer   Date Value Ref Range Status   01/12/2022 92 >60 mL/min/1.73 Final     Alkaline Phosphatase   Date Value Ref Range Status   01/12/2022 55 39 - 117 U/L Final     Total Protein   Date Value Ref Range Status   01/12/2022 7.1 6.0 - 8.5 g/dL Final     ALT (SGPT)   Date Value Ref Range Status   01/12/2022 23 1 - 41 U/L Final     AST (SGOT)   Date Value Ref Range Status   01/12/2022 23 1 - 40 U/L Final     Total Bilirubin   Date Value Ref Range Status   01/12/2022 0.8 0.0 - 1.2 mg/dL Final     Albumin   Date Value Ref Range Status   01/12/2022 5.10 3.50 - 5.20 g/dL Final     Globulin   Date Value Ref Range Status   01/12/2022 2.0  gm/dL Final     Lab Results   Component Value Date    WBC 5.20 01/12/2022    HGB 10.0 (L) 01/12/2022    HCT 29.3 (L) 01/12/2022    .1 (H) 01/12/2022     (H) 01/12/2022     Lab Results   Component Value Date    NEUTROABS 2.46 01/12/2022    IRON 109 05/28/2020    IRON 84 02/26/2020    TIBC 301 05/28/2020    TIBC 375 02/26/2020    LABIRON 36 05/28/2020    LABIRON 22 02/26/2020    FERRITIN 687.00 (H) 05/28/2020    FERRITIN 684 (H) 03/23/2020    FERRITIN 776.00 (H) 02/26/2020    [id number] 1,160 (H) 10/27/2021    [id number] >2,000 (H) 05/28/2020    FOLATE 11.70 05/28/2020     No results found for: , LABCA2, AFPTM, HCGQUANT, , CHROMGRNA, [id number], CEA, REFLABREPO      PATHOLOGY:  * Cannot find OR log *         RADIOLOGY DATA :  No radiology results for the last 7 days        Assessment/Plan     1.  MPN/MDS overlap syndrome  - Review oncology history for prior treatment details  - Remains on hydroxyurea 1500 mg p.o. Monday Wednesday and Friday, rest of the week patient is taking 1000 mg p.o. daily  - Patient has not been able to tolerate any higher dose of hydroxyurea secondary to significant anemia and fatigue with it.  - CBC done today shows white blood cell count is 5.20, hemoglobin is 10.0, platelet count is 596,000.  - Patient was recently evaluated at [place] by Dr. Arredondo on November 23, 2021 and recommended to continue with Hydrea.  - We will recheck CBC in 5  weeks from now  - Patient will return to clinic in 10 weeks with repeat CBC and CMP on that day.    2.  Anemia and thrombocytosis:  - Secondary to MPN/MDS overlap syndrome plus chemotherapy and hydroxyurea  - We will monitor with CBC    3.  Health maintenance: Patient does not smoke    4. Advance Care Planning: For now patient remains full code and is able to make decisions.  Patient has health care surrogate mentioned on chart.    5.  Prescriptions: Patient has enough prescription for hydroxyurea             PHQ-9 Total  Score: 0   -Patient is not homicidal or suicidal.  No acute intervention required.    Marv Messina reports a pain score of 0.  Given his pain assessment as noted, treatment options were discussed and the following options were decided upon as a follow-up plan to address the patient's pain: continuation of current treatment plan for pain.         Vadim Song MD  1/12/2022  10:11 CST        Part of this note may be an electronic transcription/translation of spoken language to printed text using the Dragon Dictation System.          CC:

## 2022-02-16 ENCOUNTER — LAB (OUTPATIENT)
Dept: ONCOLOGY | Facility: HOSPITAL | Age: 67
End: 2022-02-16

## 2022-02-16 DIAGNOSIS — D75.839 THROMBOCYTOSIS: ICD-10-CM

## 2022-02-16 DIAGNOSIS — D64.9 ANEMIA, UNSPECIFIED TYPE: ICD-10-CM

## 2022-02-16 DIAGNOSIS — D46.9 MDS/MPN (MYELODYSPLASTIC/MYELOPROLIFERATIVE NEOPLASMS): ICD-10-CM

## 2022-02-16 LAB
ANISOCYTOSIS BLD QL: ABNORMAL
BASOPHILS # BLD MANUAL: 0.09 10*3/MM3 (ref 0–0.2)
BASOPHILS NFR BLD MANUAL: 2 % (ref 0–1.5)
DACRYOCYTES BLD QL SMEAR: ABNORMAL
DEPRECATED RDW RBC AUTO: 99.2 FL (ref 37–54)
EOSINOPHIL # BLD MANUAL: 0.09 10*3/MM3 (ref 0–0.4)
EOSINOPHIL NFR BLD MANUAL: 2 % (ref 0.3–6.2)
ERYTHROCYTE [DISTWIDTH] IN BLOOD BY AUTOMATED COUNT: 23.3 % (ref 12.3–15.4)
HCT VFR BLD AUTO: 27.4 % (ref 37.5–51)
HGB BLD-MCNC: 9.4 G/DL (ref 13–17.7)
LYMPHOCYTES # BLD MANUAL: 1.94 10*3/MM3 (ref 0.7–3.1)
LYMPHOCYTES NFR BLD MANUAL: 6 % (ref 5–12)
MACROCYTES BLD QL SMEAR: ABNORMAL
MCH RBC QN AUTO: 42.7 PG (ref 26.6–33)
MCHC RBC AUTO-ENTMCNC: 34.3 G/DL (ref 31.5–35.7)
MCV RBC AUTO: 124.5 FL (ref 79–97)
MONOCYTES # BLD: 0.28 10*3/MM3 (ref 0.1–0.9)
NEUTROPHILS # BLD AUTO: 2.22 10*3/MM3 (ref 1.7–7)
NEUTROPHILS NFR BLD MANUAL: 45 % (ref 42.7–76)
NEUTS BAND NFR BLD MANUAL: 3 % (ref 0–5)
NRBC SPEC MANUAL: 3 /100 WBC (ref 0–0.2)
OVALOCYTES BLD QL SMEAR: ABNORMAL
PLATELET # BLD AUTO: 685 10*3/MM3 (ref 140–450)
PMV BLD AUTO: 10.7 FL (ref 6–12)
POLYCHROMASIA BLD QL SMEAR: ABNORMAL
RBC # BLD AUTO: 2.2 10*6/MM3 (ref 4.14–5.8)
SMALL PLATELETS BLD QL SMEAR: ABNORMAL
VARIANT LYMPHS NFR BLD MANUAL: 3 % (ref 0–5)
VARIANT LYMPHS NFR BLD MANUAL: 39 % (ref 19.6–45.3)
WBC MORPH BLD: NORMAL
WBC NRBC COR # BLD: 4.63 10*3/MM3 (ref 3.4–10.8)

## 2022-02-16 PROCEDURE — 85007 BL SMEAR W/DIFF WBC COUNT: CPT

## 2022-02-16 PROCEDURE — 85025 COMPLETE CBC W/AUTO DIFF WBC: CPT

## 2022-02-17 ENCOUNTER — TELEPHONE (OUTPATIENT)
Dept: ONCOLOGY | Facility: HOSPITAL | Age: 67
End: 2022-02-17

## 2022-02-17 NOTE — TELEPHONE ENCOUNTER
----- Message from Vadim Song MD sent at 2/17/2022  7:17 AM CST -----  Please let patient know his white blood cell count is 4.63, hemoglobin is 9.4, platelet count is 685,000.  Recommend continue with same dose of hydroxyurea for now.  Thank you

## 2022-02-21 RX ORDER — HYDROXYUREA 500 MG/1
CAPSULE ORAL
Qty: 90 CAPSULE | Refills: 1 | Status: SHIPPED | OUTPATIENT
Start: 2022-02-21 | End: 2022-05-06 | Stop reason: SDUPTHER

## 2022-02-21 NOTE — TELEPHONE ENCOUNTER
Rx Refill Note  Requested Prescriptions     Pending Prescriptions Disp Refills   • hydroxyurea (HYDREA) 500 MG capsule 90 capsule 1     Sig: Take 3 capsule  daily      Last office visit with prescribing clinician: 1/12/2022      Next office visit with prescribing clinician: 3/23/2022            Janee Ruff RN  02/21/22, 08:09 CST

## 2022-03-23 ENCOUNTER — OFFICE VISIT (OUTPATIENT)
Dept: ONCOLOGY | Facility: CLINIC | Age: 67
End: 2022-03-23

## 2022-03-23 ENCOUNTER — LAB (OUTPATIENT)
Dept: ONCOLOGY | Facility: HOSPITAL | Age: 67
End: 2022-03-23

## 2022-03-23 VITALS
HEART RATE: 89 BPM | BODY MASS INDEX: 27.65 KG/M2 | TEMPERATURE: 98.2 F | OXYGEN SATURATION: 99 % | DIASTOLIC BLOOD PRESSURE: 77 MMHG | SYSTOLIC BLOOD PRESSURE: 164 MMHG | WEIGHT: 203.9 LBS

## 2022-03-23 DIAGNOSIS — Z51.81 ENCOUNTER FOR MONITORING OF HYDROXYUREA THERAPY: ICD-10-CM

## 2022-03-23 DIAGNOSIS — D46.9 MDS/MPN (MYELODYSPLASTIC/MYELOPROLIFERATIVE NEOPLASMS): Primary | Chronic | ICD-10-CM

## 2022-03-23 DIAGNOSIS — D75.839 THROMBOCYTOSIS: Chronic | ICD-10-CM

## 2022-03-23 DIAGNOSIS — Z51.81 ENCOUNTER FOR MONITORING OF HYDROXYUREA THERAPY: Chronic | ICD-10-CM

## 2022-03-23 DIAGNOSIS — D64.9 ANEMIA, UNSPECIFIED TYPE: ICD-10-CM

## 2022-03-23 DIAGNOSIS — R53.83 OTHER FATIGUE: Chronic | ICD-10-CM

## 2022-03-23 DIAGNOSIS — D75.839 THROMBOCYTOSIS: ICD-10-CM

## 2022-03-23 DIAGNOSIS — R53.83 OTHER FATIGUE: ICD-10-CM

## 2022-03-23 DIAGNOSIS — D64.9 ANEMIA, UNSPECIFIED TYPE: Chronic | ICD-10-CM

## 2022-03-23 DIAGNOSIS — Z79.64 ENCOUNTER FOR MONITORING OF HYDROXYUREA THERAPY: ICD-10-CM

## 2022-03-23 DIAGNOSIS — Z79.64 ENCOUNTER FOR MONITORING OF HYDROXYUREA THERAPY: Chronic | ICD-10-CM

## 2022-03-23 DIAGNOSIS — D46.9 MDS/MPN (MYELODYSPLASTIC/MYELOPROLIFERATIVE NEOPLASMS): ICD-10-CM

## 2022-03-23 PROCEDURE — G0463 HOSPITAL OUTPT CLINIC VISIT: HCPCS | Performed by: INTERNAL MEDICINE

## 2022-03-23 PROCEDURE — 1126F AMNT PAIN NOTED NONE PRSNT: CPT | Performed by: INTERNAL MEDICINE

## 2022-03-23 PROCEDURE — 1123F ACP DISCUSS/DSCN MKR DOCD: CPT | Performed by: INTERNAL MEDICINE

## 2022-03-23 PROCEDURE — 99214 OFFICE O/P EST MOD 30 MIN: CPT | Performed by: INTERNAL MEDICINE

## 2022-03-23 NOTE — PROGRESS NOTES
DATE OF VISIT: 3/23/2022      REASON FOR VISIT: MPN/MDS overlap syndrome, anemia, thrombocytosis      HISTORY OF PRESENT ILLNESS:   66-year-old male with medical problem consisting of hypertension, MPN/MDS overlap syndrome for which patient is currently on hydroxyurea, anemia, thrombocytosis is here for follow-up appointment today.  Complains of fatigue.  Denies any excessive nausea or vomiting or diarrhea.  Denies any ankle ulceration.  Denies any new chest pain or shortness of breath.  Denies any bleeding.        Oncology history:    1.  MPN/MDS overlap syndrome:  -Bone marrow biopsy done at [place] on March 23, 2020 is consistent with MPN/MDS overlap syndrome.  - Patient was on hydroxyurea until September 17, 2020 due to severe anemia with hemoglobin of 7.3 and neutropenia with absolute neutrophil count of 1.45 hydroxyurea has been discontinued.  - CBC done on September 25, 2020 shows white blood cell count is 3.97, hemoglobin is 7.7, platelet count is 432,000.     -Hydroxyurea was discontinued on September 17 2020.  -Hydroxyurea was started back on October 2, 2020 at dose of 500 mg p.o. daily.  - dose of hydroxyurea was increased to 1500 mg p.o. daily on November 20, 2020.  -Due to worsening cytopenia, dose of hydroxyurea was changed to 1500 mg p.o. on Monday Wednesday Friday and rest of the week 1000 mg on January 8, 2021  -Dose of hydroxyurea was changed to 1500 mg p.o. daily on Monday Wednesday and Friday and rest of the week 1000 mg p.o. daily on July 14, 2021          Past Medical History, Past Surgical History, Social History, Family History have been reviewed and are without significant changes except as mentioned.    Review of Systems   A comprehensive 14 point review of systems was performed and was negative except as mentioned.    Medications:  The current medication list was reviewed in the EMR    ALLERGIES:    Allergies   Allergen Reactions   • Other Rash     Pt was working with [employer] and  broke out in a rash   • Tetracycline Rash       Objective      Vitals:    03/23/22 1027   BP: 164/77   Pulse: 89   Temp: 98.2 °F (36.8 °C)   SpO2: 99%   Weight: 92.5 kg (203 lb 14.4 oz)   PainSc: 0-No pain     Current Status 7/14/2021   ECOG score 0       Physical Exam      RECENT LABS:  Glucose   Date Value Ref Range Status   01/12/2022 106 (H) 65 - 99 mg/dL Final     Sodium   Date Value Ref Range Status   01/12/2022 139 136 - 145 mmol/L Final     Potassium   Date Value Ref Range Status   01/12/2022 4.3 3.5 - 5.2 mmol/L Final     CO2   Date Value Ref Range Status   01/12/2022 28.0 22.0 - 29.0 mmol/L Final     Chloride   Date Value Ref Range Status   01/12/2022 103 98 - 107 mmol/L Final     Anion Gap   Date Value Ref Range Status   01/12/2022 8.0 5.0 - 15.0 mmol/L Final     Creatinine   Date Value Ref Range Status   01/12/2022 0.84 0.76 - 1.27 mg/dL Final     BUN   Date Value Ref Range Status   01/12/2022 14 8 - 23 mg/dL Final     BUN/Creatinine Ratio   Date Value Ref Range Status   01/12/2022 16.7 7.0 - 25.0 Final     Calcium   Date Value Ref Range Status   01/12/2022 9.4 8.6 - 10.5 mg/dL Final     eGFR Non  Amer   Date Value Ref Range Status   01/12/2022 92 >60 mL/min/1.73 Final     Alkaline Phosphatase   Date Value Ref Range Status   01/12/2022 55 39 - 117 U/L Final     Total Protein   Date Value Ref Range Status   01/12/2022 7.1 6.0 - 8.5 g/dL Final     ALT (SGPT)   Date Value Ref Range Status   01/12/2022 23 1 - 41 U/L Final     AST (SGOT)   Date Value Ref Range Status   01/12/2022 23 1 - 40 U/L Final     Total Bilirubin   Date Value Ref Range Status   01/12/2022 0.8 0.0 - 1.2 mg/dL Final     Albumin   Date Value Ref Range Status   01/12/2022 5.10 3.50 - 5.20 g/dL Final     Globulin   Date Value Ref Range Status   01/12/2022 2.0 gm/dL Final     Lab Results   Component Value Date    WBC 4.63 02/16/2022    HGB 9.4 (L) 02/16/2022    HCT 27.4 (L) 02/16/2022    .5 (H) 02/16/2022     (H)  02/16/2022     Lab Results   Component Value Date    NEUTROABS 2.22 02/16/2022    IRON 109 05/28/2020    IRON 84 02/26/2020    TIBC 301 05/28/2020    TIBC 375 02/26/2020    LABIRON 36 05/28/2020    LABIRON 22 02/26/2020    FERRITIN 687.00 (H) 05/28/2020    FERRITIN 684 (H) 03/23/2020    FERRITIN 776.00 (H) 02/26/2020    [id number] 1,160 (H) 10/27/2021    [id number] >2,000 (H) 05/28/2020    FOLATE 11.70 05/28/2020     No results found for: , LABCA2, AFPTM, HCGQUANT, , CHROMGRNA, [id number], CEA, REFLABREPO         CBC AND DIFFERENTIAL  Order: [id number]  Component   Ref Range & Units 2 d ago   White Blood Cells   3.9 - 10.7 x10(3)/mcL 3.4 Low     Red Blood Cells   4.50 - 6.00 x10(6)/mcL 2.28 Low     Hemoglobin   14.0 - 18.1 gm/dL 9.7 Low     Hematocrit   41 - 49 % 29 Low     MCV   81 - 98 fL 127 High     MCH   27.0 - 32.0 pg 42.5 High     Mean Cell Hemoglobin Concentration   31.0 - 35.0 gm/dL 33.6    RDW   37.4 - 52.4 fL 98.8 High     RDW   11.1 - 14.3 % 22.5 High     Platelet   135 - 371 x10(3)/mcL 506 High     MPV   9.3 - 12.8 fL 11.6    Nucleated RBC   0 - 0 /100 WBC 2 High     Nucleated RBC Abs   0.00 - 0.00 x10(3)/mcL 0.06 High     Auto Neutrophil Absolute   1.60 - 8.10 x10(3)/mcL 2.26    Resulting Agency [facility]   Specimen Collected: 03/21/22 10:58 Last Resulted: 03/21/22 14:51   Received From: [facility]  Result Received: 03/23/22 11:13         PATHOLOGY:  * Cannot find OR log *         RADIOLOGY DATA :  No radiology results for the last 7 days        Assessment/Plan     1.  MPN/MDS overlap syndrome  -Review oncology history for prior treatment details  -Remains on hydroxyurea 1500 mg p.o. daily Monday Wednesday and Friday rest of the week patient is taking 1000 mg p.o. daily.  -Patient has not been able to tolerate any higher dose of Hydrea secondary to significant anemia and fatigue with it.  -CBC done at [place] on March 21, 2022 shows white blood cell count is  3.4, hemoglobin is 9.7, platelet count is 506,000.  -Recommend continue with same dose of hydroxyurea.  -We will have patient return to clinic in 2 months with repeat CBC and BMP to be done on that day.      2.  Anemia and thrombocytosis:  -Secondary to MPN/MDS overlap syndrome plus chemotherapy and hydroxyurea  -Hemoglobin is 9.7, platelet count is 506,000.  -We will monitor with CBC    3.  Health maintenance: Patient does not smoke    4. Advance Care Planning: For now patient remains full code and is able to make decisions.  Patient has health care surrogate mentioned on chart.    5.  Prescriptions: Patient is in a prescription for hydroxyurea           PHQ-9 Total Score: 0   -Patient is not homicidal or suicidal.  No acute intervention required.    Marv Messina reports a pain score of 0.  Given his pain assessment as noted, treatment options were discussed and the following options were decided upon as a follow-up plan to address the patient's pain: continuation of current treatment plan for pain.         Vadim Snog MD  3/23/2022  10:46 CDT        Part of this note may be an electronic transcription/translation of spoken language to printed text using the Dragon Dictation System.          CC:

## 2022-05-06 RX ORDER — HYDROXYUREA 500 MG/1
CAPSULE ORAL
Qty: 90 CAPSULE | Refills: 1 | Status: SHIPPED | OUTPATIENT
Start: 2022-05-06 | End: 2022-07-20 | Stop reason: SDUPTHER

## 2022-05-06 NOTE — TELEPHONE ENCOUNTER
Rx Refill Note  Requested Prescriptions     Pending Prescriptions Disp Refills   • hydroxyurea (HYDREA) 500 MG capsule 90 capsule 1     Sig: Take 3 capsule  daily      Last office visit with prescribing clinician: 3/23/2022      Next office visit with prescribing clinician: 5/25/2022            Nova Herrera RN  05/06/22, 12:29 CDT

## 2022-05-25 ENCOUNTER — LAB (OUTPATIENT)
Dept: ONCOLOGY | Facility: HOSPITAL | Age: 67
End: 2022-05-25

## 2022-05-25 ENCOUNTER — OFFICE VISIT (OUTPATIENT)
Dept: ONCOLOGY | Facility: CLINIC | Age: 67
End: 2022-05-25

## 2022-05-25 VITALS
HEART RATE: 87 BPM | DIASTOLIC BLOOD PRESSURE: 76 MMHG | OXYGEN SATURATION: 98 % | BODY MASS INDEX: 27.61 KG/M2 | TEMPERATURE: 97.9 F | WEIGHT: 203.6 LBS | SYSTOLIC BLOOD PRESSURE: 149 MMHG

## 2022-05-25 DIAGNOSIS — Z51.81 ENCOUNTER FOR MONITORING OF HYDROXYUREA THERAPY: ICD-10-CM

## 2022-05-25 DIAGNOSIS — R53.83 OTHER FATIGUE: ICD-10-CM

## 2022-05-25 DIAGNOSIS — R53.83 OTHER FATIGUE: Chronic | ICD-10-CM

## 2022-05-25 DIAGNOSIS — D64.9 ANEMIA, UNSPECIFIED TYPE: Chronic | ICD-10-CM

## 2022-05-25 DIAGNOSIS — D46.9 MDS/MPN (MYELODYSPLASTIC/MYELOPROLIFERATIVE NEOPLASMS): ICD-10-CM

## 2022-05-25 DIAGNOSIS — Z51.81 ENCOUNTER FOR MONITORING OF HYDROXYUREA THERAPY: Chronic | ICD-10-CM

## 2022-05-25 DIAGNOSIS — Z79.64 ENCOUNTER FOR MONITORING OF HYDROXYUREA THERAPY: ICD-10-CM

## 2022-05-25 DIAGNOSIS — D64.9 ANEMIA, UNSPECIFIED TYPE: ICD-10-CM

## 2022-05-25 DIAGNOSIS — D46.9 MDS/MPN (MYELODYSPLASTIC/MYELOPROLIFERATIVE NEOPLASMS): Primary | Chronic | ICD-10-CM

## 2022-05-25 DIAGNOSIS — D75.839 THROMBOCYTOSIS: ICD-10-CM

## 2022-05-25 DIAGNOSIS — D75.839 THROMBOCYTOSIS: Chronic | ICD-10-CM

## 2022-05-25 DIAGNOSIS — Z79.64 ENCOUNTER FOR MONITORING OF HYDROXYUREA THERAPY: Chronic | ICD-10-CM

## 2022-05-25 LAB
ALBUMIN SERPL-MCNC: 4.6 G/DL (ref 3.5–5.2)
ALBUMIN/GLOB SERPL: 1.9 G/DL
ALP SERPL-CCNC: 53 U/L (ref 39–117)
ALT SERPL W P-5'-P-CCNC: 30 U/L (ref 1–41)
ANION GAP SERPL CALCULATED.3IONS-SCNC: 11 MMOL/L (ref 5–15)
ANISOCYTOSIS BLD QL: ABNORMAL
AST SERPL-CCNC: 25 U/L (ref 1–40)
BASOPHILS # BLD MANUAL: 0.1 10*3/MM3 (ref 0–0.2)
BASOPHILS NFR BLD MANUAL: 2 % (ref 0–1.5)
BILIRUB SERPL-MCNC: 1 MG/DL (ref 0–1.2)
BUN SERPL-MCNC: 13 MG/DL (ref 8–23)
BUN/CREAT SERPL: 14.4 (ref 7–25)
CALCIUM SPEC-SCNC: 9.9 MG/DL (ref 8.6–10.5)
CHLORIDE SERPL-SCNC: 104 MMOL/L (ref 98–107)
CO2 SERPL-SCNC: 26 MMOL/L (ref 22–29)
CREAT SERPL-MCNC: 0.9 MG/DL (ref 0.76–1.27)
DEPRECATED RDW RBC AUTO: 99.2 FL (ref 37–54)
EGFRCR SERPLBLD CKD-EPI 2021: 94.2 ML/MIN/1.73
EOSINOPHIL # BLD MANUAL: 0.1 10*3/MM3 (ref 0–0.4)
EOSINOPHIL NFR BLD MANUAL: 2 % (ref 0.3–6.2)
ERYTHROCYTE [DISTWIDTH] IN BLOOD BY AUTOMATED COUNT: 23.1 % (ref 12.3–15.4)
GLOBULIN UR ELPH-MCNC: 2.4 GM/DL
GLUCOSE SERPL-MCNC: 104 MG/DL (ref 65–99)
HCT VFR BLD AUTO: 26.8 % (ref 37.5–51)
HGB BLD-MCNC: 9.3 G/DL (ref 13–17.7)
HOLD SPECIMEN: NORMAL
LYMPHOCYTES # BLD MANUAL: 1.74 10*3/MM3 (ref 0.7–3.1)
LYMPHOCYTES NFR BLD MANUAL: 2 % (ref 5–12)
MACROCYTES BLD QL SMEAR: ABNORMAL
MCH RBC QN AUTO: 42.5 PG (ref 26.6–33)
MCHC RBC AUTO-ENTMCNC: 34.7 G/DL (ref 31.5–35.7)
MCV RBC AUTO: 122.4 FL (ref 79–97)
MONOCYTES # BLD: 0.1 10*3/MM3 (ref 0.1–0.9)
NEUTROPHILS # BLD AUTO: 2.81 10*3/MM3 (ref 1.7–7)
NEUTROPHILS NFR BLD MANUAL: 57 % (ref 42.7–76)
NEUTS BAND NFR BLD MANUAL: 1 % (ref 0–5)
NRBC SPEC MANUAL: 2 /100 WBC (ref 0–0.2)
OVALOCYTES BLD QL SMEAR: ABNORMAL
PLAT MORPH BLD: NORMAL
PLATELET # BLD AUTO: 478 10*3/MM3 (ref 140–450)
PMV BLD AUTO: 11.1 FL (ref 6–12)
POLYCHROMASIA BLD QL SMEAR: ABNORMAL
POTASSIUM SERPL-SCNC: 4.4 MMOL/L (ref 3.5–5.2)
PROT SERPL-MCNC: 7 G/DL (ref 6–8.5)
RBC # BLD AUTO: 2.19 10*6/MM3 (ref 4.14–5.8)
SODIUM SERPL-SCNC: 141 MMOL/L (ref 136–145)
VARIANT LYMPHS NFR BLD MANUAL: 2 % (ref 0–5)
VARIANT LYMPHS NFR BLD MANUAL: 34 % (ref 19.6–45.3)
WBC MORPH BLD: NORMAL
WBC NRBC COR # BLD: 4.84 10*3/MM3 (ref 3.4–10.8)

## 2022-05-25 PROCEDURE — 1123F ACP DISCUSS/DSCN MKR DOCD: CPT | Performed by: INTERNAL MEDICINE

## 2022-05-25 PROCEDURE — 36415 COLL VENOUS BLD VENIPUNCTURE: CPT

## 2022-05-25 PROCEDURE — 1126F AMNT PAIN NOTED NONE PRSNT: CPT | Performed by: INTERNAL MEDICINE

## 2022-05-25 PROCEDURE — 85025 COMPLETE CBC W/AUTO DIFF WBC: CPT

## 2022-05-25 PROCEDURE — 99214 OFFICE O/P EST MOD 30 MIN: CPT | Performed by: INTERNAL MEDICINE

## 2022-05-25 PROCEDURE — G0463 HOSPITAL OUTPT CLINIC VISIT: HCPCS | Performed by: INTERNAL MEDICINE

## 2022-05-25 PROCEDURE — 85007 BL SMEAR W/DIFF WBC COUNT: CPT

## 2022-05-25 PROCEDURE — 80053 COMPREHEN METABOLIC PANEL: CPT

## 2022-05-25 NOTE — PROGRESS NOTES
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  Complains of fatigue.  Denies any excessive nausea or vomiting or diarrhea with hydroxyurea.  Denies any ankle ulceration.  Denies any new chest pain or shortness of breath.  Denies any bleeding.        Oncology history:    1.  MPN/MDS overlap syndrome:  -Bone marrow biopsy done at [place] on March 23, 2020 is consistent with MPN/MDS overlap syndrome.  - Patient was on hydroxyurea until September 17, 2020 due to severe anemia with hemoglobin of 7.3 and neutropenia with absolute neutrophil count of 1.45 hydroxyurea has been discontinued.  - CBC done on September 25, 2020 shows white blood cell count is 3.97, hemoglobin is 7.7, platelet count is 432,000.     -Hydroxyurea was discontinued on September 17 2020.  -Hydroxyurea was started back on October 2, 2020 at dose of 500 mg p.o. daily.  - dose of hydroxyurea was increased to 1500 mg p.o. daily on November 20, 2020.  -Due to worsening cytopenia, dose of hydroxyurea was changed to 1500 mg p.o. on Monday Wednesday Friday and rest of the week 1000 mg on January 8, 2021  -Dose of hydroxyurea was changed to 1500 mg p.o. daily on Monday Wednesday and Friday and rest of the week 1000 mg p.o. daily on July 14, 2021            Past Medical History, Past Surgical History, Social History, Family History have been reviewed and are without significant changes except as mentioned.    Review of Systems   A comprehensive 14 point review of systems was performed and was negative except as mentioned in HPI.    Medications:  The current medication list was reviewed in the EMR    ALLERGIES:    Allergies   Allergen Reactions   • Other Rash     Pt was  working with walnut wood and broke out in a rash   • Tetracycline Rash       Objective      Vitals:    05/25/22 1013   BP: 149/76   Pulse: 87   Temp: 97.9 °F (36.6 °C)   TempSrc: Temporal   SpO2: 98%   Weight: 92.4 kg (203 lb 9.6 oz)   PainSc: 0-No pain     Current Status 7/14/2021   ECOG score 0       Physical Exam  Pulmonary:      Breath sounds: Normal breath sounds.   Neurological:      Mental Status: He is alert and oriented to person, place, and time.           RECENT LABS:  Glucose   Date Value Ref Range Status   05/25/2022 104 (H) 65 - 99 mg/dL Final     Sodium   Date Value Ref Range Status   05/25/2022 141 136 - 145 mmol/L Final     Potassium   Date Value Ref Range Status   05/25/2022 4.4 3.5 - 5.2 mmol/L Final     CO2   Date Value Ref Range Status   05/25/2022 26.0 22.0 - 29.0 mmol/L Final     Chloride   Date Value Ref Range Status   05/25/2022 104 98 - 107 mmol/L Final     Anion Gap   Date Value Ref Range Status   05/25/2022 11.0 5.0 - 15.0 mmol/L Final     Creatinine   Date Value Ref Range Status   05/25/2022 0.90 0.76 - 1.27 mg/dL Final     BUN   Date Value Ref Range Status   05/25/2022 13 8 - 23 mg/dL Final     BUN/Creatinine Ratio   Date Value Ref Range Status   05/25/2022 14.4 7.0 - 25.0 Final     Calcium   Date Value Ref Range Status   05/25/2022 9.9 8.6 - 10.5 mg/dL Final     eGFR Non  Amer   Date Value Ref Range Status   01/12/2022 92 >60 mL/min/1.73 Final     Alkaline Phosphatase   Date Value Ref Range Status   05/25/2022 53 39 - 117 U/L Final     Total Protein   Date Value Ref Range Status   05/25/2022 7.0 6.0 - 8.5 g/dL Final     ALT (SGPT)   Date Value Ref Range Status   05/25/2022 30 1 - 41 U/L Final     AST (SGOT)   Date Value Ref Range Status   05/25/2022 25 1 - 40 U/L Final     Total Bilirubin   Date Value Ref Range Status   05/25/2022 1.0 0.0 - 1.2 mg/dL Final     Albumin   Date Value Ref Range Status   05/25/2022 4.60 3.50 - 5.20 g/dL Final     Globulin   Date Value Ref Range  Status   05/25/2022 2.4 gm/dL Final     Lab Results   Component Value Date    WBC 4.84 05/25/2022    HGB 9.3 (L) 05/25/2022    HCT 26.8 (L) 05/25/2022    .4 (H) 05/25/2022     (H) 05/25/2022     Lab Results   Component Value Date    NEUTROABS 2.22 02/16/2022    IRON 109 05/28/2020    IRON 84 02/26/2020    TIBC 301 05/28/2020    TIBC 375 02/26/2020    LABIRON 36 05/28/2020    LABIRON 22 02/26/2020    FERRITIN 687.00 (H) 05/28/2020    FERRITIN 684 (H) 03/23/2020    FERRITIN 776.00 (H) 02/26/2020    [id number] 1,160 (H) 10/27/2021    [id number] >2,000 (H) 05/28/2020    FOLATE 11.70 05/28/2020     No results found for: , LABCA2, AFPTM, HCGQUANT, , CHROMGRNA, [id number], CEA, REFLABREPO      PATHOLOGY:  * Cannot find OR log *         RADIOLOGY DATA :  No radiology results for the last 7 days        Assessment & Plan     1.  MPN/MDS overlap syndrome  - Review oncology history for prior treatment details  - Patient is currently on hydroxyurea 1500 mg p.o. on Monday Wednesday and Friday rest of the week patient is taking 1000 mg p.o. daily  - Patient has not been able to tolerate dose of hydroxyurea secondary to worsening anemia and significant fatigue  - CBC done today shows white blood cell count is 4.84, hemoglobin is 9.3, platelet count is 478,000.  - Recommend continuing with same dose of hydroxyurea.  - Patient will return to clinic in 2 months with repeat CBC and CMP on that day    2.  Anemia and thrombocytosis:  - Secondary to MPN/MDS overlap syndrome plus chemotherapy plus hydroxyurea  - Hemoglobin is 9.3, platelet count is 478,000.  - We will monitor with CBC    3.  Health maintenance: Patient does not smoke    4. Advance Care Planning: For now patient remains full code and is able to make decisions.  Patient has health care surrogate mentioned on chart.    5.  Prescriptions: Patient has enough prescription for hydroxyurea             PHQ-9 Total Score: 0   -Patient is not homicidal or  suicidal.  No acute intervention required.    Marv Messina reports a pain score of 0.  Given his pain assessment as noted, treatment options were discussed and the following options were decided upon as a follow-up plan to address the patient's pain: continuation of current treatment plan for pain.         Vadim Song MD  5/25/2022  10:39 CDT        Part of this note may be an electronic transcription/translation of spoken language to printed text using the Dragon Dictation System.          CC:

## 2022-07-19 DIAGNOSIS — D46.9 MDS/MPN (MYELODYSPLASTIC/MYELOPROLIFERATIVE NEOPLASMS): ICD-10-CM

## 2022-07-19 DIAGNOSIS — R53.83 OTHER FATIGUE: Primary | ICD-10-CM

## 2022-07-19 DIAGNOSIS — E55.9 VITAMIN D DEFICIENCY: ICD-10-CM

## 2022-07-19 DIAGNOSIS — D72.829 LEUKOCYTOSIS, UNSPECIFIED TYPE: ICD-10-CM

## 2022-07-20 RX ORDER — HYDROXYUREA 500 MG/1
CAPSULE ORAL
Qty: 90 CAPSULE | Refills: 1 | Status: SHIPPED | OUTPATIENT
Start: 2022-07-20 | End: 2022-07-27

## 2022-07-20 NOTE — TELEPHONE ENCOUNTER
Rx Refill Note  Requested Prescriptions     Pending Prescriptions Disp Refills   • hydroxyurea (HYDREA) 500 MG capsule 90 capsule 1     Sig: Take 3 capsule  daily      Last office visit with prescribing clinician: 5/25/2022      Next office visit with prescribing clinician: 7/27/2022            Janee Ruff RN  07/20/22, 12:06 CDT

## 2022-07-27 ENCOUNTER — OFFICE VISIT (OUTPATIENT)
Dept: ONCOLOGY | Facility: CLINIC | Age: 67
End: 2022-07-27

## 2022-07-27 ENCOUNTER — LAB (OUTPATIENT)
Dept: ONCOLOGY | Facility: HOSPITAL | Age: 67
End: 2022-07-27

## 2022-07-27 VITALS
SYSTOLIC BLOOD PRESSURE: 138 MMHG | OXYGEN SATURATION: 98 % | WEIGHT: 202.1 LBS | TEMPERATURE: 98 F | DIASTOLIC BLOOD PRESSURE: 63 MMHG | BODY MASS INDEX: 27.4 KG/M2 | HEART RATE: 86 BPM

## 2022-07-27 DIAGNOSIS — D75.839 THROMBOCYTOSIS: ICD-10-CM

## 2022-07-27 DIAGNOSIS — E55.9 VITAMIN D DEFICIENCY: ICD-10-CM

## 2022-07-27 DIAGNOSIS — D47.1 MPN (MYELOPROLIFERATIVE NEOPLASM): ICD-10-CM

## 2022-07-27 DIAGNOSIS — D46.9 MDS/MPN (MYELODYSPLASTIC/MYELOPROLIFERATIVE NEOPLASMS): ICD-10-CM

## 2022-07-27 DIAGNOSIS — D75.839 THROMBOCYTOSIS: Primary | ICD-10-CM

## 2022-07-27 DIAGNOSIS — Z79.64 ENCOUNTER FOR MONITORING OF HYDROXYUREA THERAPY: ICD-10-CM

## 2022-07-27 DIAGNOSIS — D64.9 ANEMIA, UNSPECIFIED TYPE: ICD-10-CM

## 2022-07-27 DIAGNOSIS — Z51.81 ENCOUNTER FOR MONITORING OF HYDROXYUREA THERAPY: ICD-10-CM

## 2022-07-27 DIAGNOSIS — R53.83 OTHER FATIGUE: ICD-10-CM

## 2022-07-27 LAB
25(OH)D3 SERPL-MCNC: 24.7 NG/ML (ref 30–100)
ALBUMIN SERPL-MCNC: 4.8 G/DL (ref 3.5–5.2)
ALBUMIN/GLOB SERPL: 2.7 G/DL
ALP SERPL-CCNC: 51 U/L (ref 39–117)
ALT SERPL W P-5'-P-CCNC: 21 U/L (ref 1–41)
ANION GAP SERPL CALCULATED.3IONS-SCNC: 10 MMOL/L (ref 5–15)
ANISOCYTOSIS BLD QL: NORMAL
AST SERPL-CCNC: 21 U/L (ref 1–40)
BILIRUB SERPL-MCNC: 1 MG/DL (ref 0–1.2)
BUN SERPL-MCNC: 14 MG/DL (ref 8–23)
BUN/CREAT SERPL: 18.2 (ref 7–25)
CALCIUM SPEC-SCNC: 8.9 MG/DL (ref 8.6–10.5)
CHLORIDE SERPL-SCNC: 106 MMOL/L (ref 98–107)
CO2 SERPL-SCNC: 25 MMOL/L (ref 22–29)
CREAT SERPL-MCNC: 0.77 MG/DL (ref 0.76–1.27)
DACRYOCYTES BLD QL SMEAR: NORMAL
DEPRECATED RDW RBC AUTO: 96.6 FL (ref 37–54)
EGFRCR SERPLBLD CKD-EPI 2021: 98.7 ML/MIN/1.73
EOSINOPHIL # BLD MANUAL: 0.04 10*3/MM3 (ref 0–0.4)
EOSINOPHIL NFR BLD MANUAL: 1 % (ref 0.3–6.2)
ERYTHROCYTE [DISTWIDTH] IN BLOOD BY AUTOMATED COUNT: 22.3 % (ref 12.3–15.4)
GLOBULIN UR ELPH-MCNC: 1.8 GM/DL
GLUCOSE SERPL-MCNC: 104 MG/DL (ref 65–99)
HCT VFR BLD AUTO: 25.7 % (ref 37.5–51)
HGB BLD-MCNC: 8.9 G/DL (ref 13–17.7)
LYMPHOCYTES # BLD MANUAL: 2 10*3/MM3 (ref 0.7–3.1)
LYMPHOCYTES NFR BLD MANUAL: 8 % (ref 5–12)
MACROCYTES BLD QL SMEAR: NORMAL
MCH RBC QN AUTO: 42.4 PG (ref 26.6–33)
MCHC RBC AUTO-ENTMCNC: 34.6 G/DL (ref 31.5–35.7)
MCV RBC AUTO: 122.4 FL (ref 79–97)
MONOCYTES # BLD: 0.36 10*3/MM3 (ref 0.1–0.9)
NEUTROPHILS # BLD AUTO: 2.04 10*3/MM3 (ref 1.7–7)
NEUTROPHILS NFR BLD MANUAL: 44 % (ref 42.7–76)
NEUTS BAND NFR BLD MANUAL: 2 % (ref 0–5)
OVALOCYTES BLD QL SMEAR: NORMAL
PLATELET # BLD AUTO: 537 10*3/MM3 (ref 140–450)
PMV BLD AUTO: 10.9 FL (ref 6–12)
POIKILOCYTOSIS BLD QL SMEAR: NORMAL
POTASSIUM SERPL-SCNC: 4.4 MMOL/L (ref 3.5–5.2)
PROT SERPL-MCNC: 6.6 G/DL (ref 6–8.5)
RBC # BLD AUTO: 2.1 10*6/MM3 (ref 4.14–5.8)
SMALL PLATELETS BLD QL SMEAR: NORMAL
SODIUM SERPL-SCNC: 141 MMOL/L (ref 136–145)
VARIANT LYMPHS NFR BLD MANUAL: 1 % (ref 0–5)
VARIANT LYMPHS NFR BLD MANUAL: 44 % (ref 19.6–45.3)
WBC MORPH BLD: NORMAL
WBC NRBC COR # BLD: 4.44 10*3/MM3 (ref 3.4–10.8)

## 2022-07-27 PROCEDURE — 80053 COMPREHEN METABOLIC PANEL: CPT

## 2022-07-27 PROCEDURE — 82306 VITAMIN D 25 HYDROXY: CPT

## 2022-07-27 PROCEDURE — 1126F AMNT PAIN NOTED NONE PRSNT: CPT | Performed by: INTERNAL MEDICINE

## 2022-07-27 PROCEDURE — 85025 COMPLETE CBC W/AUTO DIFF WBC: CPT

## 2022-07-27 PROCEDURE — 99214 OFFICE O/P EST MOD 30 MIN: CPT | Performed by: INTERNAL MEDICINE

## 2022-07-27 PROCEDURE — G0463 HOSPITAL OUTPT CLINIC VISIT: HCPCS | Performed by: INTERNAL MEDICINE

## 2022-07-27 PROCEDURE — 85007 BL SMEAR W/DIFF WBC COUNT: CPT

## 2022-07-27 PROCEDURE — 1123F ACP DISCUSS/DSCN MKR DOCD: CPT | Performed by: INTERNAL MEDICINE

## 2022-07-27 RX ORDER — HYDROXYUREA 500 MG/1
CAPSULE ORAL
COMMUNITY
End: 2022-08-30 | Stop reason: SDUPTHER

## 2022-07-27 NOTE — TELEPHONE ENCOUNTER
Caller: ROSANA NINA    Relationship to patient: SELF    Best call back number: [phone]    PT MISSED A CALL FROM [facility]. DOES NOT KNOW WHAT THE CALL WAS IN REGARD TO. PLEASE CALL BACK WHEN POSSIBLE   GENERAL ANESTHESIA OR SEDATION ADULT DISCHARGE INSTRUCTIONS   SPECIAL PRECAUTIONS FOR 24 HOURS AFTER SURGERY    IT IS NOT UNUSUAL TO FEEL LIGHT-HEADED OR FAINT, UP TO 24 HOURS AFTER SURGERY OR WHILE TAKING PAIN MEDICATION.  IF YOU HAVE THESE SYMPTOMS; SIT FOR A FEW MINUTES BEFORE STANDING AND HAVE SOMEONE ASSIST YOU WHEN YOU GET UP TO WALK OR USE THE BATHROOM.    YOU SHOULD REST AND RELAX FOR THE NEXT 24 HOURS AND YOU MUST MAKE ARRANGEMENTS TO HAVE SOMEONE STAY WITH YOU FOR AT LEAST 24 HOURS AFTER YOUR DISCHARGE.  AVOID HAZARDOUS AND STRENUOUS ACTIVITIES.  DO NOT MAKE IMPORTANT DECISIONS FOR 24 HOURS.    DO NOT DRIVE ANY VEHICLE OR OPERATE MECHANICAL EQUIPMENT FOR 24 HOURS FOLLOWING THE END OF YOUR SURGERY.  EVEN THOUGH YOU MAY FEEL NORMAL, YOUR REACTIONS MAY BE AFFECTED BY THE MEDICATION YOU HAVE RECEIVED.    DO NOT DRINK ALCOHOLIC BEVERAGES FOR 24 HOURS FOLLOWING YOUR SURGERY.    DRINK CLEAR LIQUIDS (APPLE JUICE, GINGER ALE, 7-UP, BROTH, ETC.).  PROGRESS TO YOUR REGULAR DIET AS YOU FEEL ABLE.    YOU MAY HAVE A DRY MOUTH, A SORE THROAT, MUSCLES ACHES OR TROUBLE SLEEPING.  THESE SHOULD GO AWAY AFTER 24 HOURS.    CALL YOUR DOCTOR FOR ANY OF THE FOLLOWING:  SIGNS OF INFECTION (FEVER, GROWING TENDERNESS AT THE SURGERY SITE, A LARGE AMOUNT OF DRAINAGE OR BLEEDING, SEVERE PAIN, FOUL-SMELLING DRAINAGE, REDNESS OR SWELLING.    IT HAS BEEN OVER 8 TO 10 HOURS SINCE SURGERY AND YOU ARE STILL NOT ABLE TO URINATE (PASS WATER).

## 2022-07-27 NOTE — PROGRESS NOTES
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  Complains of fatigue.  Denies any excessive nausea or vomiting or diarrhea with hydroxyurea.  Complains of recent sinus infection which is chronic for him.  Denies any ankle ulceration.  Denies any new chest pain or shortness of breath.  Denies any bleeding.            Oncology history:    1.  MPN/MDS overlap syndrome:  -Bone marrow biopsy done at [facility] on March 23, 2020 is consistent with MPN/MDS overlap syndrome.  - Patient was on hydroxyurea until September 17, 2020 due to severe anemia with hemoglobin of 7.3 and neutropenia with absolute neutrophil count of 1.45 hydroxyurea has been discontinued.  - CBC done on September 25, 2020 shows white blood cell count is 3.97, hemoglobin is 7.7, platelet count is 432,000.     -Hydroxyurea was discontinued on September 17 2020.  -Hydroxyurea was started back on October 2, 2020 at dose of 500 mg p.o. daily.  - dose of hydroxyurea was increased to 1500 mg p.o. daily on November 20, 2020.  -Due to worsening cytopenia, dose of hydroxyurea was changed to 1500 mg p.o. on Monday Wednesday Friday and rest of the week 1000 mg on January 8, 2021  -Dose of hydroxyurea was changed to 1500 mg p.o. daily on Monday Wednesday and Friday and rest of the week 1000 mg p.o. daily on July 14, 2021            Past Medical History, Past Surgical History, Social History, Family History have been reviewed and are without significant changes except as mentioned.    Review of Systems   A comprehensive 14 point review of systems was performed and was negative except as mentioned in HPI.    Medications:  The current medication list was reviewed in the  EMR    ALLERGIES:    Allergies   Allergen Reactions   • Other Rash     Pt was working with walnut wood and broke out in a rash   • Tetracycline Rash       Objective      Vitals:    07/27/22 1005   BP: 138/63   Pulse: 86   Temp: 98 °F (36.7 °C)   TempSrc: Temporal   SpO2: 98%   Weight: 91.7 kg (202 lb 1.6 oz)   PainSc: 0-No pain     Current Status 7/14/2021   ECOG score 0       Physical Exam  Pulmonary:      Breath sounds: Normal breath sounds.   Neurological:      Mental Status: He is alert and oriented to person, place, and time.           RECENT LABS:  Glucose   Date Value Ref Range Status   07/27/2022 104 (H) 65 - 99 mg/dL Final     Sodium   Date Value Ref Range Status   07/27/2022 141 136 - 145 mmol/L Final     Potassium   Date Value Ref Range Status   07/27/2022 4.4 3.5 - 5.2 mmol/L Final     CO2   Date Value Ref Range Status   07/27/2022 25.0 22.0 - 29.0 mmol/L Final     Chloride   Date Value Ref Range Status   07/27/2022 106 98 - 107 mmol/L Final     Anion Gap   Date Value Ref Range Status   07/27/2022 10.0 5.0 - 15.0 mmol/L Final     Creatinine   Date Value Ref Range Status   07/27/2022 0.77 0.76 - 1.27 mg/dL Final     BUN   Date Value Ref Range Status   07/27/2022 14 8 - 23 mg/dL Final     BUN/Creatinine Ratio   Date Value Ref Range Status   07/27/2022 18.2 7.0 - 25.0 Final     Calcium   Date Value Ref Range Status   07/27/2022 8.9 8.6 - 10.5 mg/dL Final     eGFR Non  Amer   Date Value Ref Range Status   01/12/2022 92 >60 mL/min/1.73 Final     Alkaline Phosphatase   Date Value Ref Range Status   07/27/2022 51 39 - 117 U/L Final     Total Protein   Date Value Ref Range Status   07/27/2022 6.6 6.0 - 8.5 g/dL Final     ALT (SGPT)   Date Value Ref Range Status   07/27/2022 21 1 - 41 U/L Final     AST (SGOT)   Date Value Ref Range Status   07/27/2022 21 1 - 40 U/L Final     Total Bilirubin   Date Value Ref Range Status   07/27/2022 1.0 0.0 - 1.2 mg/dL Final     Albumin   Date Value Ref Range Status    07/27/2022 4.80 3.50 - 5.20 g/dL Final     Globulin   Date Value Ref Range Status   07/27/2022 1.8 gm/dL Final     Lab Results   Component Value Date    WBC 4.44 07/27/2022    HGB 8.9 (L) 07/27/2022    HCT 25.7 (L) 07/27/2022    .4 (H) 07/27/2022     (H) 07/27/2022     Lab Results   Component Value Date    NEUTROABS 2.04 07/27/2022    IRON 109 05/28/2020    IRON 84 02/26/2020    TIBC 301 05/28/2020    TIBC 375 02/26/2020    LABIRON 36 05/28/2020    LABIRON 22 02/26/2020    FERRITIN 687.00 (H) 05/28/2020    FERRITIN 684 (H) 03/23/2020    FERRITIN 776.00 (H) 02/26/2020    [id number] 1,160 (H) 10/27/2021    [id number] >2,000 (H) 05/28/2020    FOLATE 11.70 05/28/2020     No results found for: , LABCA2, AFPTM, HCGQUANT, , CHROMGRNA, [id number], CEA, REFLABREPO      Vitamin D 25 Hydroxy  Order: [id number]   Status: Final result     Visible to patient: No (scheduled for 7/28/2022 10:32 AM)     Next appt: 09/28/2022 at 09:30 AM in Oncology (NURSE  [facility])     Dx: Vitamin D deficiency; Other fatigue    Specimen Information: Arm, Right; Blood         0 Result Notes    Component   Ref Range & Units 1 d ago 9 mo ago   25 Hydroxy, Vitamin D   30.0 - 100.0 ng/ml 24.7 Low   20.1 R    [facility]ing [facility]             Narrative  Performed by:  [facility]  Reference Range for Total Vitamin D 25(OH)     Deficiency <20.0 ng/mL   Insufficiency 21-29 ng/mL   Sufficiency  ng/mL   Toxicity >100 ng/ml     Results may be falsely increased if patient taking Biotin.       Specimen Collected: 07/27/22 09:44 Last Resulted: 07/27/22 20:32                       PATHOLOGY:  * Cannot find OR log *         RADIOLOGY DATA :  No radiology results for the last 7 days        Assessment & Plan     1.  MPN/MDS overlap syndrome  - Review oncology history for prior treatment details  - Patient is currently on hydroxyurea 1500 mg p.o. on Monday, Wednesday and Friday.  Rest of the week patient is taking Hydrea  1000 mg p.o. daily.  - Patient has not been able to tolerate any higher dose of hydroxyurea secondary to worsening anemia and significant fatigue.  - CBC done earlier today shows white blood cell count is 4.4, hemoglobin is 8.9, platelet count is 537,000  - Recommend continuing with same dose of hydroxyurea.  - Patient will return clinic appointment in 2 months  with repeat CBC and CMP on that day    2.  Anemia and thrombocytosis:  - Secondary to MPN/MDS overlap syndrome because chemotherapy plus hydroxyurea  -Hemoglobin is 8.9, platelet count is 537,000  - We will monitor with CBC    3.  Health maintenance: Patient does not smoke    4. Advance Care Planning: For now patient remains full code and is able to make decisions.  Patient has health care surrogate mentioned on chart.    5.  Prescriptions: Patient has enough prescription for hydroxyurea    6.  Vitamin D deficiency:  - Vitamin D level is 24.  Recommend vitamin D 2000 international unit p.o. daily.  Recommend rechecking vitamin D level around November 2022.             PHQ-9 Total Score: 0   -Patient is not homicidal or suicidal.  No acute intervention required.    Marv Messina reports a pain score of 0.  Given his pain assessment as noted, treatment options were discussed and the following options were decided upon as a follow-up plan to address the patient's pain: continuation of current treatment plan for pain.         Vadim Song MD  7/28/2022  07:09 CDT        Part of this note may be an electronic transcription/translation of spoken language to printed text using the Dragon Dictation System.          CC:

## 2022-07-28 ENCOUNTER — TELEPHONE (OUTPATIENT)
Dept: ONCOLOGY | Facility: HOSPITAL | Age: 67
End: 2022-07-28

## 2022-07-28 NOTE — TELEPHONE ENCOUNTER
----- Message from Vadim Song MD sent at 7/28/2022  7:09 AM CDT -----  Regarding: Labs  Please let patient know his vitamin D level is 24 which is still below normal but improved as compared to last time it was checked.  Recommend taking vitamin D 2000 international unit daily and rechecking vitamin D level around November 2022.  Thank you

## 2022-07-28 NOTE — TELEPHONE ENCOUNTER
Contacted pt regarding lab work. PT states he is taking Vitamin D 2000IU daily. PT denies any further questions.

## 2022-08-30 RX ORDER — HYDROXYUREA 500 MG/1
CAPSULE ORAL
Qty: 68 CAPSULE | Refills: 1 | Status: SHIPPED | OUTPATIENT
Start: 2022-08-30 | End: 2022-10-03 | Stop reason: SDUPTHER

## 2022-09-28 ENCOUNTER — APPOINTMENT (OUTPATIENT)
Dept: ONCOLOGY | Facility: CLINIC | Age: 67
End: 2022-09-28

## 2022-09-28 ENCOUNTER — APPOINTMENT (OUTPATIENT)
Dept: ONCOLOGY | Facility: HOSPITAL | Age: 67
End: 2022-09-28

## 2022-09-29 NOTE — PROGRESS NOTES
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  Complains of fatigue.  Denies any excessive nausea or vomiting or diarrhea with hydroxyurea.  Denies any ankle ulceration.  Denies any new chest pain or shortness of breath.  Denies any bleeding.            Oncology history:    1.  MPN/MDS overlap syndrome:  -Bone marrow biopsy done at [facility] on March 23, 2020 is consistent with MPN/MDS overlap syndrome.  - Patient was on hydroxyurea until September 17, 2020 due to severe anemia with hemoglobin of 7.3 and neutropenia with absolute neutrophil count of 1.45 hydroxyurea has been discontinued.  - CBC done on September 25, 2020 shows white blood cell count is 3.97, hemoglobin is 7.7, platelet count is 432,000.     -Hydroxyurea was discontinued on September 17 2020.  -Hydroxyurea was started back on October 2, 2020 at dose of 500 mg p.o. daily.  - dose of hydroxyurea was increased to 1500 mg p.o. daily on November 20, 2020.  -Due to worsening cytopenia, dose of hydroxyurea was changed to 1500 mg p.o. on Monday Wednesday Friday and rest of the week 1000 mg on January 8, 2021  -Dose of hydroxyurea was changed to 1500 mg p.o. daily on Monday Wednesday and Friday and rest of the week 1000 mg p.o. daily on July 14, 2021              Past Medical History, Past Surgical History, Social History, Family History have been reviewed and are without significant changes except as mentioned.    Review of Systems   A comprehensive 14 point review of systems was performed and was negative except as mentioned in HPI.    Medications:  The current medication list was reviewed in the EMR    ALLERGIES:    Allergies   Allergen Reactions   • Other Rash     Pt was  working with walnut wood and broke out in a rash   • Tetracycline Rash       Objective      Vitals:    09/30/22 0853   BP: 139/70   Pulse: 86   Temp: 97.5 °F (36.4 °C)   SpO2: 99%   Weight: 88.2 kg (194 lb 6.4 oz)   PainSc: 0-No pain     Current Status 7/14/2021   ECOG score 0       Physical Exam  Pulmonary:      Breath sounds: Normal breath sounds.   Neurological:      Mental Status: He is alert and oriented to person, place, and time.           RECENT LABS:  Glucose   Date Value Ref Range Status   07/27/2022 104 (H) 65 - 99 mg/dL Final     Sodium   Date Value Ref Range Status   07/27/2022 141 136 - 145 mmol/L Final     Potassium   Date Value Ref Range Status   07/27/2022 4.4 3.5 - 5.2 mmol/L Final     CO2   Date Value Ref Range Status   07/27/2022 25.0 22.0 - 29.0 mmol/L Final     Chloride   Date Value Ref Range Status   07/27/2022 106 98 - 107 mmol/L Final     Anion Gap   Date Value Ref Range Status   07/27/2022 10.0 5.0 - 15.0 mmol/L Final     Creatinine   Date Value Ref Range Status   07/27/2022 0.77 0.76 - 1.27 mg/dL Final     BUN   Date Value Ref Range Status   07/27/2022 14 8 - 23 mg/dL Final     BUN/Creatinine Ratio   Date Value Ref Range Status   07/27/2022 18.2 7.0 - 25.0 Final     Calcium   Date Value Ref Range Status   07/27/2022 8.9 8.6 - 10.5 mg/dL Final     eGFR Non  Amer   Date Value Ref Range Status   01/12/2022 92 >60 mL/min/1.73 Final     Alkaline Phosphatase   Date Value Ref Range Status   07/27/2022 51 39 - 117 U/L Final     Total Protein   Date Value Ref Range Status   07/27/2022 6.6 6.0 - 8.5 g/dL Final     ALT (SGPT)   Date Value Ref Range Status   07/27/2022 21 1 - 41 U/L Final     AST (SGOT)   Date Value Ref Range Status   07/27/2022 21 1 - 40 U/L Final     Total Bilirubin   Date Value Ref Range Status   07/27/2022 1.0 0.0 - 1.2 mg/dL Final     Albumin   Date Value Ref Range Status   07/27/2022 4.80 3.50 - 5.20 g/dL Final     Globulin   Date Value Ref Range Status   07/27/2022 1.8  gm/dL Final     Lab Results   Component Value Date    WBC 4.44 07/27/2022    HGB 8.9 (L) 07/27/2022    HCT 25.7 (L) 07/27/2022    .4 (H) 07/27/2022     (H) 07/27/2022     Lab Results   Component Value Date    NEUTROABS 2.04 07/27/2022    IRON 109 05/28/2020    IRON 84 02/26/2020    TIBC 301 05/28/2020    TIBC 375 02/26/2020    LABIRON 36 05/28/2020    LABIRON 22 02/26/2020    FERRITIN 687.00 (H) 05/28/2020    FERRITIN 684 (H) 03/23/2020    FERRITIN 776.00 (H) 02/26/2020    [id number] 1,160 (H) 10/27/2021    JNAUOJVG84 >2,000 (H) 05/28/2020    FOLATE 11.70 05/28/2020     No results found for: , LABCA2, AFPTM, HCGQUANT, , CHROMGRNA, [id number], CEA, REFLABREPO      Contains abnormal data CBC AND DIFFERENTIAL  Order: [id number]  Component   Ref Range & Units 9 d ago   White Blood Cells   3.9 - 10.7 x10(3)/mcL 5.2    Red Blood Cells   4.50 - 6.00 x10(6)/mcL 2.21 Low     Hemoglobin   14.0 - 18.1 gm/dL 9.4 Low     Hematocrit   41 - 49 % 27 Low     MCV   81 - 98 fL 123 High     MCH   27.0 - 32.0 pg 42.5 High     Mean Cell Hemoglobin Concentration   31.0 - 35.0 gm/dL 34.6    RDW   37.4 - 52.4 fL 94.5 High     RDW   11.1 - 14.3 % 21.4 High     Platelet   135 - 371 x10(3)/mcL 617 High     MPV   9.3 - 12.8 fL 10.7    Nucleated RBC   0 - 0 /100 WBC 2 High     Nucleated RBC Abs   0.00 - 0.00 x10(3)/mcL 0.09 High     Auto Neutrophil Absolute   1.60 - 8.10 x10(3)/mcL 4.16    Comment: This test was performed at: [facility],CLIA# [id number],Max De La Cruz, PhD, [facility], Medical Director,[address] [place] [address],[address] [place] [address]   Resulting Agency [facility]   Specimen Collected: 09/21/22 11:49 Last Resulted: 09/21/22 20:09   Received From: [facility]  Result Received: 09/27/22 16:08     View Encounter      Received Information                          Specimen Date Taken Specimen Time Taken Specimen Received Date Specimen Received Time Result Date Result Time   Sep  21, 2022 11:49 AM   Sep 21, 2022  8:09 PM               PATHOLOGY:  * Cannot find OR log *         RADIOLOGY DATA :  No radiology results for the last 7 days        Assessment & Plan     1.  MPN/MDS overlap syndrome  -Review oncology history for prior treatment details  - Patient is currently on hydroxyurea 1500 mg p.o. Monday Wednesday and Friday.  Rest of the week patient is taking 1000 mg p.o. daily  - Patient has not been able to tolerate any higher dose of hydroxyurea secondary to worsening anemia and significant fatigue  - CBC done at [facility] on September 21, 2022 shows white blood cell count is 5.2, hemoglobin is 9.4, platelet count is 617,000  - Recommend continuing with same dose of hydroxyurea.  -Records were reviewed from [facility].  Dr. Ayers did discuss couple of clinical trial option with patient.  Patient is thinking about clinical trial but he is scared about side effects of clinical trial.  - Patient will return to clinic in 2 months with repeat CBC, 25-hydroxy vitamin D and CMP on that day.      2.  Anemia and thrombocytosis:  - Secondary to MPN/MDS overlap syndrome plus chemotherapy with hydroxyurea  - Hemoglobin is 9.4, platelet count is 617,000  - We will monitor with CBC    3.  Vitamin D deficiency  - Patient is currently on vitamin D 2000 international unit p.o. daily.  We will recheck vitamin D level upon next clinic visit in 2 months    4.  Health maintenance: Patient does not smoke    5. Advance Care Planning: For now patient remains full code and is able to make decisions.  Patient has health care surrogate mentioned on chart.     6.  Prescriptions: Patient has enough prescription for hydroxyurea      PHQ-9 Total Score: 0   -Patient is not homicidal or suicidal.  No acute intervention required.    Marv Haroon Siddharth reports a pain score of 0.  Given his pain assessment as noted, treatment options were discussed and the following options were decided upon as a follow-up plan to  address the patient's pain: continuation of current treatment plan for pain.         Vadim Song MD  9/30/2022  09:05 CDT        Part of this note may be an electronic transcription/translation of spoken language to printed text using the Dragon Dictation System.          CC:

## 2022-09-30 ENCOUNTER — OFFICE VISIT (OUTPATIENT)
Dept: ONCOLOGY | Facility: CLINIC | Age: 67
End: 2022-09-30

## 2022-09-30 ENCOUNTER — APPOINTMENT (OUTPATIENT)
Dept: ONCOLOGY | Facility: HOSPITAL | Age: 67
End: 2022-09-30

## 2022-09-30 VITALS
BODY MASS INDEX: 26.36 KG/M2 | DIASTOLIC BLOOD PRESSURE: 70 MMHG | WEIGHT: 194.4 LBS | SYSTOLIC BLOOD PRESSURE: 139 MMHG | HEART RATE: 86 BPM | TEMPERATURE: 97.5 F | OXYGEN SATURATION: 99 %

## 2022-09-30 DIAGNOSIS — E55.9 VITAMIN D DEFICIENCY: ICD-10-CM

## 2022-09-30 DIAGNOSIS — R53.83 OTHER FATIGUE: Chronic | ICD-10-CM

## 2022-09-30 DIAGNOSIS — D64.9 ANEMIA, UNSPECIFIED TYPE: Chronic | ICD-10-CM

## 2022-09-30 DIAGNOSIS — D46.9 MDS/MPN (MYELODYSPLASTIC/MYELOPROLIFERATIVE NEOPLASMS): Chronic | ICD-10-CM

## 2022-09-30 DIAGNOSIS — Z51.81 ENCOUNTER FOR MONITORING OF HYDROXYUREA THERAPY: Chronic | ICD-10-CM

## 2022-09-30 DIAGNOSIS — D75.839 THROMBOCYTOSIS: Primary | Chronic | ICD-10-CM

## 2022-09-30 DIAGNOSIS — Z79.64 ENCOUNTER FOR MONITORING OF HYDROXYUREA THERAPY: Chronic | ICD-10-CM

## 2022-09-30 PROCEDURE — 1126F AMNT PAIN NOTED NONE PRSNT: CPT | Performed by: INTERNAL MEDICINE

## 2022-09-30 PROCEDURE — 1123F ACP DISCUSS/DSCN MKR DOCD: CPT | Performed by: INTERNAL MEDICINE

## 2022-09-30 PROCEDURE — G0463 HOSPITAL OUTPT CLINIC VISIT: HCPCS | Performed by: INTERNAL MEDICINE

## 2022-09-30 PROCEDURE — 99214 OFFICE O/P EST MOD 30 MIN: CPT | Performed by: INTERNAL MEDICINE

## 2022-09-30 RX ORDER — PREDNISONE 20 MG/1
TABLET ORAL
COMMUNITY
Start: 2022-09-07

## 2022-09-30 NOTE — TELEPHONE ENCOUNTER
Incoming Refill Request      Medication requested (name and dose):     Pharmacy where request should be sent:     Additional details provided by patient: Pt is checking back with you to see if you found out about labs  Best call back number:     Does the patient have less than a 3 day supply:  [] Yes  [] No    Yesenia Elias  09/30/22, 15:04 CDT

## 2022-10-04 RX ORDER — HYDROXYUREA 500 MG/1
CAPSULE ORAL
Qty: 68 CAPSULE | Refills: 1 | Status: SHIPPED | OUTPATIENT
Start: 2022-10-04 | End: 2022-11-28 | Stop reason: SDUPTHER

## 2022-10-04 NOTE — TELEPHONE ENCOUNTER
Rx Refill Note  Requested Prescriptions     Pending Prescriptions Disp Refills   • hydroxyurea (HYDREA) 500 MG capsule 68 capsule 1     Sig: Take 3 tablets by mouth on Monday, Wednesday and Friday.  Rest of the week take 2 tablets p.o. daily      Last office visit with prescribing clinician: 9/30/2022      Next office visit with prescribing clinician: 12/2/2022            Osmany Morejon Rep  10/04/22, 08:28 CDT

## 2022-11-28 RX ORDER — HYDROXYUREA 500 MG/1
CAPSULE ORAL
Qty: 68 CAPSULE | Refills: 1 | Status: SHIPPED | OUTPATIENT
Start: 2022-11-28 | End: 2023-01-30 | Stop reason: SDUPTHER

## 2022-11-28 NOTE — TELEPHONE ENCOUNTER
Rx Refill Note  Requested Prescriptions     Pending Prescriptions Disp Refills   • hydroxyurea (HYDREA) 500 MG capsule 68 capsule 1     Sig: Take 3 tablets by mouth on Monday, Wednesday and Friday.  Rest of the week take 2 tablets p.o. daily      Last office visit with prescribing clinician: 9/30/2022      Next office visit with prescribing clinician: 12/2/2022            Ev Bro  11/28/22, 09:18 CST

## 2022-12-02 ENCOUNTER — LAB (OUTPATIENT)
Dept: ONCOLOGY | Facility: HOSPITAL | Age: 67
End: 2022-12-02

## 2022-12-02 ENCOUNTER — OFFICE VISIT (OUTPATIENT)
Dept: ONCOLOGY | Facility: CLINIC | Age: 67
End: 2022-12-02

## 2022-12-02 VITALS
WEIGHT: 200.8 LBS | SYSTOLIC BLOOD PRESSURE: 156 MMHG | DIASTOLIC BLOOD PRESSURE: 82 MMHG | TEMPERATURE: 97.5 F | BODY MASS INDEX: 27.23 KG/M2 | OXYGEN SATURATION: 100 % | HEART RATE: 91 BPM

## 2022-12-02 DIAGNOSIS — Z12.5 SPECIAL SCREENING, PROSTATE CANCER: ICD-10-CM

## 2022-12-02 DIAGNOSIS — Z51.81 ENCOUNTER FOR MONITORING OF HYDROXYUREA THERAPY: Chronic | ICD-10-CM

## 2022-12-02 DIAGNOSIS — D46.9 MDS/MPN (MYELODYSPLASTIC/MYELOPROLIFERATIVE NEOPLASMS): Chronic | ICD-10-CM

## 2022-12-02 DIAGNOSIS — D64.9 ANEMIA, UNSPECIFIED TYPE: Chronic | ICD-10-CM

## 2022-12-02 DIAGNOSIS — Z12.5 SCREENING FOR PROSTATE CANCER: ICD-10-CM

## 2022-12-02 DIAGNOSIS — D64.9 ANEMIA, UNSPECIFIED TYPE: ICD-10-CM

## 2022-12-02 DIAGNOSIS — D46.9 MDS/MPN (MYELODYSPLASTIC/MYELOPROLIFERATIVE NEOPLASMS): Primary | Chronic | ICD-10-CM

## 2022-12-02 DIAGNOSIS — D75.839 THROMBOCYTOSIS: ICD-10-CM

## 2022-12-02 DIAGNOSIS — R53.83 OTHER FATIGUE: Chronic | ICD-10-CM

## 2022-12-02 DIAGNOSIS — D75.839 THROMBOCYTOSIS: Chronic | ICD-10-CM

## 2022-12-02 DIAGNOSIS — Z12.5 PROSTATE CANCER SCREENING: ICD-10-CM

## 2022-12-02 DIAGNOSIS — Z13.220 SCREENING CHOLESTEROL LEVEL: ICD-10-CM

## 2022-12-02 DIAGNOSIS — E55.9 VITAMIN D DEFICIENCY: ICD-10-CM

## 2022-12-02 DIAGNOSIS — R03.0 BLOOD PRESSURE INCREASE DIASTOLIC: Primary | ICD-10-CM

## 2022-12-02 DIAGNOSIS — Z79.64 ENCOUNTER FOR MONITORING OF HYDROXYUREA THERAPY: Chronic | ICD-10-CM

## 2022-12-02 DIAGNOSIS — I10 ESSENTIAL (PRIMARY) HYPERTENSION: ICD-10-CM

## 2022-12-02 DIAGNOSIS — N32.0 BLADDER-NECK OBSTRUCTION: ICD-10-CM

## 2022-12-02 DIAGNOSIS — D47.1 MPN (MYELOPROLIFERATIVE NEOPLASM): ICD-10-CM

## 2022-12-02 LAB
25(OH)D3 SERPL-MCNC: 26.7 NG/ML (ref 30–100)
ALBUMIN SERPL-MCNC: 4.7 G/DL (ref 3.5–5.2)
ALBUMIN/GLOB SERPL: 2.4 G/DL
ALP SERPL-CCNC: 61 U/L (ref 39–117)
ALT SERPL W P-5'-P-CCNC: 42 U/L (ref 1–41)
ANION GAP SERPL CALCULATED.3IONS-SCNC: 10 MMOL/L (ref 5–15)
ANISOCYTOSIS BLD QL: ABNORMAL
AST SERPL-CCNC: 32 U/L (ref 1–40)
BASOPHILS # BLD MANUAL: 0.08 10*3/MM3 (ref 0–0.2)
BASOPHILS NFR BLD MANUAL: 2 % (ref 0–1.5)
BILIRUB SERPL-MCNC: 1 MG/DL (ref 0–1.2)
BUN SERPL-MCNC: 11 MG/DL (ref 8–23)
BUN/CREAT SERPL: 12.2 (ref 7–25)
CALCIUM SPEC-SCNC: 9.5 MG/DL (ref 8.6–10.5)
CHLORIDE SERPL-SCNC: 105 MMOL/L (ref 98–107)
CHOLEST SERPL-MCNC: 137 MG/DL (ref 0–200)
CO2 SERPL-SCNC: 25 MMOL/L (ref 22–29)
CREAT SERPL-MCNC: 0.9 MG/DL (ref 0.76–1.27)
DACRYOCYTES BLD QL SMEAR: ABNORMAL
DEPRECATED RDW RBC AUTO: 100.5 FL (ref 37–54)
EGFRCR SERPLBLD CKD-EPI 2021: 94.2 ML/MIN/1.73
EOSINOPHIL # BLD MANUAL: 0.12 10*3/MM3 (ref 0–0.4)
EOSINOPHIL NFR BLD MANUAL: 3 % (ref 0.3–6.2)
ERYTHROCYTE [DISTWIDTH] IN BLOOD BY AUTOMATED COUNT: 23.4 % (ref 12.3–15.4)
GLOBULIN UR ELPH-MCNC: 2 GM/DL
GLUCOSE SERPL-MCNC: 105 MG/DL (ref 65–99)
HCT VFR BLD AUTO: 24 % (ref 37.5–51)
HDLC SERPL-MCNC: 33 MG/DL (ref 40–60)
HGB BLD-MCNC: 8.1 G/DL (ref 13–17.7)
LDLC SERPL CALC-MCNC: 83 MG/DL (ref 0–100)
LDLC/HDLC SERPL: 2.44 {RATIO}
LYMPHOCYTES # BLD MANUAL: 1.73 10*3/MM3 (ref 0.7–3.1)
LYMPHOCYTES NFR BLD MANUAL: 7 % (ref 5–12)
MACROCYTES BLD QL SMEAR: ABNORMAL
MCH RBC QN AUTO: 41.8 PG (ref 26.6–33)
MCHC RBC AUTO-ENTMCNC: 33.8 G/DL (ref 31.5–35.7)
MCV RBC AUTO: 123.7 FL (ref 79–97)
MONOCYTES # BLD: 0.29 10*3/MM3 (ref 0.1–0.9)
NEUTROPHILS # BLD AUTO: 1.9 10*3/MM3 (ref 1.7–7)
NEUTROPHILS NFR BLD MANUAL: 42 % (ref 42.7–76)
NEUTS BAND NFR BLD MANUAL: 4 % (ref 0–5)
NRBC SPEC MANUAL: 4 /100 WBC (ref 0–0.2)
OVALOCYTES BLD QL SMEAR: ABNORMAL
PLATELET # BLD AUTO: 528 10*3/MM3 (ref 140–450)
PMV BLD AUTO: 11.2 FL (ref 6–12)
POLYCHROMASIA BLD QL SMEAR: ABNORMAL
POTASSIUM SERPL-SCNC: 4.3 MMOL/L (ref 3.5–5.2)
PROT SERPL-MCNC: 6.7 G/DL (ref 6–8.5)
PSA SERPL-MCNC: 0.31 NG/ML (ref 0–4)
RBC # BLD AUTO: 1.94 10*6/MM3 (ref 4.14–5.8)
SMALL PLATELETS BLD QL SMEAR: ABNORMAL
SODIUM SERPL-SCNC: 140 MMOL/L (ref 136–145)
TRIGL SERPL-MCNC: 117 MG/DL (ref 0–150)
VARIANT LYMPHS NFR BLD MANUAL: 38 % (ref 19.6–45.3)
VARIANT LYMPHS NFR BLD MANUAL: 4 % (ref 0–5)
VLDLC SERPL-MCNC: 21 MG/DL (ref 5–40)
WBC MORPH BLD: NORMAL
WBC NRBC COR # BLD: 4.12 10*3/MM3 (ref 3.4–10.8)

## 2022-12-02 PROCEDURE — G0463 HOSPITAL OUTPT CLINIC VISIT: HCPCS | Performed by: INTERNAL MEDICINE

## 2022-12-02 PROCEDURE — 99214 OFFICE O/P EST MOD 30 MIN: CPT | Performed by: INTERNAL MEDICINE

## 2022-12-02 PROCEDURE — 80061 LIPID PANEL: CPT

## 2022-12-02 PROCEDURE — 84153 ASSAY OF PSA TOTAL: CPT

## 2022-12-02 PROCEDURE — 1123F ACP DISCUSS/DSCN MKR DOCD: CPT | Performed by: INTERNAL MEDICINE

## 2022-12-02 PROCEDURE — 82306 VITAMIN D 25 HYDROXY: CPT

## 2022-12-02 PROCEDURE — 80053 COMPREHEN METABOLIC PANEL: CPT

## 2022-12-02 PROCEDURE — 85025 COMPLETE CBC W/AUTO DIFF WBC: CPT

## 2022-12-02 PROCEDURE — 1126F AMNT PAIN NOTED NONE PRSNT: CPT | Performed by: INTERNAL MEDICINE

## 2022-12-02 PROCEDURE — 85007 BL SMEAR W/DIFF WBC COUNT: CPT

## 2022-12-02 RX ORDER — METRONIDAZOLE 7.5 MG/G
GEL TOPICAL
COMMUNITY
Start: 2022-10-21

## 2022-12-02 NOTE — PROGRESS NOTES
DATE OF VISIT: 12/2/2022      REASON FOR VISIT: MPN/MDS overlap syndrome, anemia, thrombocytosis      HISTORY OF PRESENT ILLNESS:   67-year-old male with medical problem consisting of hypertension, MPN/MDS overlap syndrome for which patient is currently on hydroxyurea, anemia, thrombocytosis is here for follow-up appointment today.  Complains of worsening fatigue over the last few weeks.  Denies any excessive nausea or vomiting or diarrhea with hydroxyurea.  Denies any ankle ulceration.  Denies any new chest pain or shortness of breath.  Denies any bleeding.                Oncology history:    1.  MPN/MDS overlap syndrome:  -Bone marrow biopsy done at [facility] on March 23, 2020 is consistent with MPN/MDS overlap syndrome.  - Patient was on hydroxyurea until September 17, 2020 due to severe anemia with hemoglobin of 7.3 and neutropenia with absolute neutrophil count of 1.45 hydroxyurea has been discontinued.  - CBC done on September 25, 2020 shows white blood cell count is 3.97, hemoglobin is 7.7, platelet count is 432,000.     -Hydroxyurea was discontinued on September 17 2020.  -Hydroxyurea was started back on October 2, 2020 at dose of 500 mg p.o. daily.  - dose of hydroxyurea was increased to 1500 mg p.o. daily on November 20, 2020.  -Due to worsening cytopenia, dose of hydroxyurea was changed to 1500 mg p.o. on Monday Wednesday Friday and rest of the week 1000 mg on January 8, 2021  -Dose of hydroxyurea was changed to 1500 mg p.o. daily on Monday Wednesday and Friday and rest of the week 1000 mg p.o. daily on July 14, 2021                Past Medical History, Past Surgical History, Social History, Family History have been reviewed and are without significant changes except as mentioned.    Review of Systems   A comprehensive 14 point review of systems was performed and was negative except as mentioned in HPI.    Medications:  The current medication list was reviewed in the EMR    ALLERGIES:    Allergies    Allergen Reactions   • Other Rash     Pt was working with walnut wood and broke out in a rash   • Tetracycline Rash       Objective      Vitals:    12/02/22 0812   BP: 156/82   Pulse: 91   Temp: 97.5 °F (36.4 °C)   TempSrc: Temporal   SpO2: 100%   Weight: 91.1 kg (200 lb 12.8 oz)   PainSc: 0-No pain     Current Status 7/14/2021   ECOG score 0       Physical Exam  Pulmonary:      Breath sounds: Normal breath sounds.   Neurological:      Mental Status: He is alert and oriented to person, place, and time.           RECENT LABS:  Glucose   Date Value Ref Range Status   07/27/2022 104 (H) 65 - 99 mg/dL Final     Sodium   Date Value Ref Range Status   07/27/2022 141 136 - 145 mmol/L Final     Potassium   Date Value Ref Range Status   07/27/2022 4.4 3.5 - 5.2 mmol/L Final     CO2   Date Value Ref Range Status   07/27/2022 25.0 22.0 - 29.0 mmol/L Final     Chloride   Date Value Ref Range Status   07/27/2022 106 98 - 107 mmol/L Final     Anion Gap   Date Value Ref Range Status   07/27/2022 10.0 5.0 - 15.0 mmol/L Final     Creatinine   Date Value Ref Range Status   07/27/2022 0.77 0.76 - 1.27 mg/dL Final     BUN   Date Value Ref Range Status   07/27/2022 14 8 - 23 mg/dL Final     BUN/Creatinine Ratio   Date Value Ref Range Status   07/27/2022 18.2 7.0 - 25.0 Final     Calcium   Date Value Ref Range Status   07/27/2022 8.9 8.6 - 10.5 mg/dL Final     eGFR Non  Amer   Date Value Ref Range Status   01/12/2022 92 >60 mL/min/1.73 Final     Alkaline Phosphatase   Date Value Ref Range Status   07/27/2022 51 39 - 117 U/L Final     Total Protein   Date Value Ref Range Status   07/27/2022 6.6 6.0 - 8.5 g/dL Final     ALT (SGPT)   Date Value Ref Range Status   07/27/2022 21 1 - 41 U/L Final     AST (SGOT)   Date Value Ref Range Status   07/27/2022 21 1 - 40 U/L Final     Total Bilirubin   Date Value Ref Range Status   07/27/2022 1.0 0.0 - 1.2 mg/dL Final     Albumin   Date Value Ref Range Status   07/27/2022 4.80 3.50 - 5.20  g/dL Final     Globulin   Date Value Ref Range Status   07/27/2022 1.8 gm/dL Final     Lab Results   Component Value Date    WBC 4.12 12/02/2022    HGB 8.1 (L) 12/02/2022    HCT 24.0 (L) 12/02/2022    .7 (H) 12/02/2022     (H) 12/02/2022     Lab Results   Component Value Date    NEUTROABS 2.04 07/27/2022    IRON 109 05/28/2020    IRON 84 02/26/2020    TIBC 301 05/28/2020    TIBC 375 02/26/2020    LABIRON 36 05/28/2020    LABIRON 22 02/26/2020    FERRITIN 687.00 (H) 05/28/2020    FERRITIN 684 (H) 03/23/2020    FERRITIN 776.00 (H) 02/26/2020    [id number] 1,160 (H) 10/27/2021    [id number] >2,000 (H) 05/28/2020    FOLATE 11.70 05/28/2020     No results found for: , LABCA2, AFPTM, HCGQUANT, , CHROMGRNA, [id number], CEA, REFLABREPO      PATHOLOGY:  * Cannot find OR log *         RADIOLOGY DATA :  No radiology results for the last 7 days        Assessment & Plan     1.  MPN/MDS overlap syndrome  - Review oncology history for prior treatment details  - Patient is currently on hydroxyurea 1500 mg p.o. Monday Wednesday and Friday.  Rest of the week patient is taking 1000 mg p.o. daily  - Patient has not been able to tolerate any higher dose of hydroxyurea secondary to worsening anemia and significant fatigue.  - CBC done today shows white blood cell count is 4.12, hemoglobin is 8.1, platelet count is 528,000  - Recommend decreasing hydroxyurea to 1500 mg p.o. on Monday and Friday.  Recommend hydroxyurea 1000 mg for rest of the week  - Patient has been followed up at [facility] for same.  - We will have patient return to clinic in 6 weeks with repeat CBC,  and CMP on that day.    2.  Anemia and thrombocytosis:  Is secondary to MPN/MDS overlap syndrome plus chemotherapy plus hydroxyurea  - Hemoglobin today is 8.1, platelet count is 528,000  - We will monitor with CBC    3.  Vitamin D deficiency:  - Patient is on Vitamin D 2000 international unit p.o. daily.  Vitamin D level done today is minimally  improved to 26.7.  - We will recheck vitamin D level around March 2023    4.  Elevated liver function test:  - ALT borderline elevated at 42.  We will recheck CMP upon next clinic visit in 3 months.    5.  Health maintenance: Patient does not smoke    6. Advance Care Planning: For now patient remains full code and is able to make decisions.  Patient has health care surrogate mentioned on chart.    7.  Prescriptions: Patient has enough prescription for hydroxyurea             PHQ-9 Total Score: 0   -Patient is not homicidal or suicidal.  No acute intervention required.    Marv Messina reports a pain score of 0.  Given his pain assessment as noted, treatment options were discussed and the following options were decided upon as a follow-up plan to address the patient's pain: continuation of current treatment plan for pain.         Vadim Song MD  12/2/2022  08:34 CST        Part of this note may be an electronic transcription/translation of spoken language to printed text using the Dragon Dictation System.          CC:

## 2022-12-05 ENCOUNTER — TELEPHONE (OUTPATIENT)
Dept: ONCOLOGY | Facility: HOSPITAL | Age: 67
End: 2022-12-05

## 2022-12-05 NOTE — TELEPHONE ENCOUNTER
----- Message from Vadim Song MD sent at 12/2/2022  5:28 PM CST -----  Please let patient know, vitamin D level is showing minimal improvement.  Recommend continuing with same dose of vitamin D 2000 international unit daily.  His liver enzyme ALT is borderline elevated at 42.  Normal is 41.  Will recheck liver enzymes upon next clinic visit.  Thank you

## 2022-12-05 NOTE — TELEPHONE ENCOUNTER
Pt returns call, lab results given along with medication instructions as per Dr. Song, v/u obtained.

## 2023-01-13 ENCOUNTER — OFFICE VISIT (OUTPATIENT)
Dept: ONCOLOGY | Facility: CLINIC | Age: 68
End: 2023-01-13
Payer: MEDICARE

## 2023-01-13 ENCOUNTER — LAB (OUTPATIENT)
Dept: ONCOLOGY | Facility: HOSPITAL | Age: 68
End: 2023-01-13
Payer: MEDICARE

## 2023-01-13 VITALS
SYSTOLIC BLOOD PRESSURE: 174 MMHG | OXYGEN SATURATION: 100 % | BODY MASS INDEX: 27.13 KG/M2 | HEART RATE: 77 BPM | DIASTOLIC BLOOD PRESSURE: 64 MMHG | WEIGHT: 200.1 LBS | TEMPERATURE: 97.5 F

## 2023-01-13 DIAGNOSIS — D75.839 THROMBOCYTOSIS: Primary | Chronic | ICD-10-CM

## 2023-01-13 DIAGNOSIS — Z79.64 ENCOUNTER FOR MONITORING OF HYDROXYUREA THERAPY: ICD-10-CM

## 2023-01-13 DIAGNOSIS — Z51.81 ENCOUNTER FOR MONITORING OF HYDROXYUREA THERAPY: Chronic | ICD-10-CM

## 2023-01-13 DIAGNOSIS — D75.839 THROMBOCYTOSIS: ICD-10-CM

## 2023-01-13 DIAGNOSIS — D46.9 MDS/MPN (MYELODYSPLASTIC/MYELOPROLIFERATIVE NEOPLASMS): Chronic | ICD-10-CM

## 2023-01-13 DIAGNOSIS — R53.83 OTHER FATIGUE: Chronic | ICD-10-CM

## 2023-01-13 DIAGNOSIS — Z79.64 ENCOUNTER FOR MONITORING OF HYDROXYUREA THERAPY: Chronic | ICD-10-CM

## 2023-01-13 DIAGNOSIS — D64.9 ANEMIA, UNSPECIFIED TYPE: Chronic | ICD-10-CM

## 2023-01-13 DIAGNOSIS — D64.9 ANEMIA, UNSPECIFIED TYPE: ICD-10-CM

## 2023-01-13 DIAGNOSIS — D46.9 MDS/MPN (MYELODYSPLASTIC/MYELOPROLIFERATIVE NEOPLASMS): ICD-10-CM

## 2023-01-13 DIAGNOSIS — R53.83 OTHER FATIGUE: ICD-10-CM

## 2023-01-13 DIAGNOSIS — Z51.81 ENCOUNTER FOR MONITORING OF HYDROXYUREA THERAPY: ICD-10-CM

## 2023-01-13 LAB
ALBUMIN SERPL-MCNC: 4.9 G/DL (ref 3.5–5.2)
ALBUMIN/GLOB SERPL: 2.2 G/DL
ALP SERPL-CCNC: 58 U/L (ref 39–117)
ALT SERPL W P-5'-P-CCNC: 27 U/L (ref 1–41)
ANION GAP SERPL CALCULATED.3IONS-SCNC: 10 MMOL/L (ref 5–15)
ANISOCYTOSIS BLD QL: ABNORMAL
AST SERPL-CCNC: 33 U/L (ref 1–40)
BILIRUB SERPL-MCNC: 0.9 MG/DL (ref 0–1.2)
BUN SERPL-MCNC: 13 MG/DL (ref 8–23)
BUN/CREAT SERPL: 18.1 (ref 7–25)
CALCIUM SPEC-SCNC: 9.3 MG/DL (ref 8.6–10.5)
CHLORIDE SERPL-SCNC: 103 MMOL/L (ref 98–107)
CO2 SERPL-SCNC: 23 MMOL/L (ref 22–29)
CREAT SERPL-MCNC: 0.72 MG/DL (ref 0.76–1.27)
DACRYOCYTES BLD QL SMEAR: ABNORMAL
DEPRECATED RDW RBC AUTO: 98.3 FL (ref 37–54)
EGFRCR SERPLBLD CKD-EPI 2021: 100.8 ML/MIN/1.73
EOSINOPHIL # BLD MANUAL: 0.32 10*3/MM3 (ref 0–0.4)
EOSINOPHIL NFR BLD MANUAL: 7 % (ref 0.3–6.2)
ERYTHROCYTE [DISTWIDTH] IN BLOOD BY AUTOMATED COUNT: 22.9 % (ref 12.3–15.4)
GLOBULIN UR ELPH-MCNC: 2.2 GM/DL
GLUCOSE SERPL-MCNC: 103 MG/DL (ref 65–99)
HCT VFR BLD AUTO: 28.2 % (ref 37.5–51)
HGB BLD-MCNC: 9.5 G/DL (ref 13–17.7)
LYMPHOCYTES # BLD MANUAL: 1.88 10*3/MM3 (ref 0.7–3.1)
LYMPHOCYTES NFR BLD MANUAL: 5 % (ref 5–12)
MACROCYTES BLD QL SMEAR: ABNORMAL
MCH RBC QN AUTO: 41.1 PG (ref 26.6–33)
MCHC RBC AUTO-ENTMCNC: 33.7 G/DL (ref 31.5–35.7)
MCV RBC AUTO: 122.1 FL (ref 79–97)
MICROCYTES BLD QL: ABNORMAL
MONOCYTES # BLD: 0.23 10*3/MM3 (ref 0.1–0.9)
NEUTROPHILS # BLD AUTO: 2.15 10*3/MM3 (ref 1.7–7)
NEUTROPHILS NFR BLD MANUAL: 47 % (ref 42.7–76)
NRBC SPEC MANUAL: 2 /100 WBC (ref 0–0.2)
PLATELET # BLD AUTO: 588 10*3/MM3 (ref 140–450)
PMV BLD AUTO: 11 FL (ref 6–12)
POTASSIUM SERPL-SCNC: 4.6 MMOL/L (ref 3.5–5.2)
PROT SERPL-MCNC: 7.1 G/DL (ref 6–8.5)
RBC # BLD AUTO: 2.31 10*6/MM3 (ref 4.14–5.8)
SMALL PLATELETS BLD QL SMEAR: ABNORMAL
SODIUM SERPL-SCNC: 136 MMOL/L (ref 136–145)
VARIANT LYMPHS NFR BLD MANUAL: 1 % (ref 0–5)
VARIANT LYMPHS NFR BLD MANUAL: 40 % (ref 19.6–45.3)
WBC MORPH BLD: NORMAL
WBC NRBC COR # BLD: 4.58 10*3/MM3 (ref 3.4–10.8)

## 2023-01-13 PROCEDURE — 85007 BL SMEAR W/DIFF WBC COUNT: CPT

## 2023-01-13 PROCEDURE — G0463 HOSPITAL OUTPT CLINIC VISIT: HCPCS | Performed by: INTERNAL MEDICINE

## 2023-01-13 PROCEDURE — 1126F AMNT PAIN NOTED NONE PRSNT: CPT | Performed by: INTERNAL MEDICINE

## 2023-01-13 PROCEDURE — 80053 COMPREHEN METABOLIC PANEL: CPT

## 2023-01-13 PROCEDURE — 99214 OFFICE O/P EST MOD 30 MIN: CPT | Performed by: INTERNAL MEDICINE

## 2023-01-13 PROCEDURE — 85025 COMPLETE CBC W/AUTO DIFF WBC: CPT

## 2023-01-13 PROCEDURE — 1123F ACP DISCUSS/DSCN MKR DOCD: CPT | Performed by: INTERNAL MEDICINE

## 2023-01-30 RX ORDER — HYDROXYUREA 500 MG/1
CAPSULE ORAL
Qty: 68 CAPSULE | Refills: 1 | Status: SHIPPED | OUTPATIENT
Start: 2023-01-30 | End: 2023-04-02 | Stop reason: SDUPTHER

## 2023-01-30 NOTE — TELEPHONE ENCOUNTER
Rx Refill Note  Requested Prescriptions     Pending Prescriptions Disp Refills   • hydroxyurea (HYDREA) 500 MG capsule 68 capsule 1     Sig: Take 3 tablets by mouth on Monday, Wednesday and Friday.  Rest of the week take 2 tablets p.o. daily      Last office visit with prescribing clinician: 1/13/2023   Last telemedicine visit with prescribing clinician: 3/10/2023   Next office visit with prescribing clinician: 3/10/2023                         Would you like a call back once the refill request has been completed: [] Yes [] No    If the office needs to give you a call back, can they leave a voicemail: [] Yes [] No    Isabela Bucio, Osmany Rep  01/30/23, 09:57 CST

## 2023-03-10 ENCOUNTER — OFFICE VISIT (OUTPATIENT)
Dept: ONCOLOGY | Facility: CLINIC | Age: 68
End: 2023-03-10
Payer: MEDICARE

## 2023-03-10 ENCOUNTER — LAB (OUTPATIENT)
Dept: ONCOLOGY | Facility: HOSPITAL | Age: 68
End: 2023-03-10
Payer: MEDICARE

## 2023-03-10 VITALS
RESPIRATION RATE: 18 BRPM | SYSTOLIC BLOOD PRESSURE: 151 MMHG | BODY MASS INDEX: 26.85 KG/M2 | WEIGHT: 198 LBS | OXYGEN SATURATION: 100 % | DIASTOLIC BLOOD PRESSURE: 80 MMHG | HEART RATE: 74 BPM

## 2023-03-10 DIAGNOSIS — Z79.64 ENCOUNTER FOR MONITORING OF HYDROXYUREA THERAPY: Chronic | ICD-10-CM

## 2023-03-10 DIAGNOSIS — Z79.64 ENCOUNTER FOR MONITORING OF HYDROXYUREA THERAPY: ICD-10-CM

## 2023-03-10 DIAGNOSIS — Z51.81 ENCOUNTER FOR MONITORING OF HYDROXYUREA THERAPY: Chronic | ICD-10-CM

## 2023-03-10 DIAGNOSIS — D75.839 THROMBOCYTOSIS: Chronic | ICD-10-CM

## 2023-03-10 DIAGNOSIS — R53.83 OTHER FATIGUE: Chronic | ICD-10-CM

## 2023-03-10 DIAGNOSIS — Z51.81 ENCOUNTER FOR MONITORING OF HYDROXYUREA THERAPY: ICD-10-CM

## 2023-03-10 DIAGNOSIS — D46.9 MDS/MPN (MYELODYSPLASTIC/MYELOPROLIFERATIVE NEOPLASMS): ICD-10-CM

## 2023-03-10 DIAGNOSIS — D64.9 ANEMIA, UNSPECIFIED TYPE: ICD-10-CM

## 2023-03-10 DIAGNOSIS — D46.9 MDS/MPN (MYELODYSPLASTIC/MYELOPROLIFERATIVE NEOPLASMS): Primary | Chronic | ICD-10-CM

## 2023-03-10 DIAGNOSIS — R53.83 OTHER FATIGUE: ICD-10-CM

## 2023-03-10 DIAGNOSIS — D75.839 THROMBOCYTOSIS: ICD-10-CM

## 2023-03-10 LAB
ALBUMIN SERPL-MCNC: 4.4 G/DL (ref 3.5–5.2)
ALBUMIN/GLOB SERPL: 2 G/DL
ALP SERPL-CCNC: 52 U/L (ref 39–117)
ALT SERPL W P-5'-P-CCNC: 26 U/L (ref 1–41)
ANION GAP SERPL CALCULATED.3IONS-SCNC: 9 MMOL/L (ref 5–15)
ANISOCYTOSIS BLD QL: NORMAL
AST SERPL-CCNC: 24 U/L (ref 1–40)
BASOPHILS # BLD AUTO: 0.05 10*3/MM3 (ref 0–0.2)
BASOPHILS NFR BLD AUTO: 1.2 % (ref 0–1.5)
BILIRUB SERPL-MCNC: 0.9 MG/DL (ref 0–1.2)
BUN SERPL-MCNC: 15 MG/DL (ref 8–23)
BUN/CREAT SERPL: 18.8 (ref 7–25)
CALCIUM SPEC-SCNC: 9.1 MG/DL (ref 8.6–10.5)
CHLORIDE SERPL-SCNC: 106 MMOL/L (ref 98–107)
CO2 SERPL-SCNC: 27 MMOL/L (ref 22–29)
CREAT SERPL-MCNC: 0.8 MG/DL (ref 0.76–1.27)
DEPRECATED RDW RBC AUTO: 104.7 FL (ref 37–54)
EGFRCR SERPLBLD CKD-EPI 2021: 97.6 ML/MIN/1.73
EOSINOPHIL # BLD AUTO: 0.1 10*3/MM3 (ref 0–0.4)
EOSINOPHIL NFR BLD AUTO: 2.4 % (ref 0.3–6.2)
ERYTHROCYTE [DISTWIDTH] IN BLOOD BY AUTOMATED COUNT: 23.5 % (ref 12.3–15.4)
GLOBULIN UR ELPH-MCNC: 2.2 GM/DL
GLUCOSE SERPL-MCNC: 111 MG/DL (ref 65–99)
HCT VFR BLD AUTO: 28.5 % (ref 37.5–51)
HGB BLD-MCNC: 9.3 G/DL (ref 13–17.7)
HOLD SPECIMEN: NORMAL
IMM GRANULOCYTES # BLD AUTO: 0.03 10*3/MM3 (ref 0–0.05)
IMM GRANULOCYTES NFR BLD AUTO: 0.7 % (ref 0–0.5)
LYMPHOCYTES # BLD AUTO: 1.56 10*3/MM3 (ref 0.7–3.1)
LYMPHOCYTES NFR BLD AUTO: 37.9 % (ref 19.6–45.3)
MCH RBC QN AUTO: 41 PG (ref 26.6–33)
MCHC RBC AUTO-ENTMCNC: 32.6 G/DL (ref 31.5–35.7)
MCV RBC AUTO: 125.6 FL (ref 79–97)
MONOCYTES # BLD AUTO: 0.3 10*3/MM3 (ref 0.1–0.9)
MONOCYTES NFR BLD AUTO: 7.3 % (ref 5–12)
NEUTROPHILS NFR BLD AUTO: 2.08 10*3/MM3 (ref 1.7–7)
NEUTROPHILS NFR BLD AUTO: 50.5 % (ref 42.7–76)
NRBC BLD AUTO-RTO: 1.2 /100 WBC (ref 0–0.2)
PLATELET # BLD AUTO: 537 10*3/MM3 (ref 140–450)
PMV BLD AUTO: 10.8 FL (ref 6–12)
POTASSIUM SERPL-SCNC: 4.3 MMOL/L (ref 3.5–5.2)
PROT SERPL-MCNC: 6.6 G/DL (ref 6–8.5)
RBC # BLD AUTO: 2.27 10*6/MM3 (ref 4.14–5.8)
SMALL PLATELETS BLD QL SMEAR: NORMAL
SODIUM SERPL-SCNC: 142 MMOL/L (ref 136–145)
WBC MORPH BLD: NORMAL
WBC NRBC COR # BLD: 4.12 10*3/MM3 (ref 3.4–10.8)

## 2023-03-10 PROCEDURE — G0463 HOSPITAL OUTPT CLINIC VISIT: HCPCS | Performed by: INTERNAL MEDICINE

## 2023-03-10 PROCEDURE — 85025 COMPLETE CBC W/AUTO DIFF WBC: CPT

## 2023-03-10 PROCEDURE — 1126F AMNT PAIN NOTED NONE PRSNT: CPT | Performed by: INTERNAL MEDICINE

## 2023-03-10 PROCEDURE — 85007 BL SMEAR W/DIFF WBC COUNT: CPT

## 2023-03-10 PROCEDURE — 1123F ACP DISCUSS/DSCN MKR DOCD: CPT | Performed by: INTERNAL MEDICINE

## 2023-03-10 PROCEDURE — 99214 OFFICE O/P EST MOD 30 MIN: CPT | Performed by: INTERNAL MEDICINE

## 2023-03-10 PROCEDURE — 80053 COMPREHEN METABOLIC PANEL: CPT

## 2023-03-10 NOTE — PROGRESS NOTES
DATE OF VISIT: 3/10/2023      REASON FOR VISIT: MPN/MDS overlap syndrome, anemia, thrombocytosis      HISTORY OF PRESENT ILLNESS:   67-year-old male with medical problem consisting of hypertension, MPN/MDS overlap syndrome for which patient is currently on Hydrea, anemia, thrombocytosis is here for follow-up appointment today.  Denies any recent worsening of fatigue.  Denies any ankle ulceration.  Denies any excessive nausea or vomiting or diarrhea with treatment.  Denies any new chest pain or shortness of breath.  Denies any bleeding.            Oncology history:    1.  MPN/MDS overlap syndrome:  -Bone marrow biopsy done at [place] on March 23, 2020 is consistent with MPN/MDS overlap syndrome.  - Patient was on hydroxyurea until September 17, 2020 due to severe anemia with hemoglobin of 7.3 and neutropenia with absolute neutrophil count of 1.45 hydroxyurea has been discontinued.  - CBC done on September 25, 2020 shows white blood cell count is 3.97, hemoglobin is 7.7, platelet count is 432,000.     -Hydroxyurea was discontinued on September 17 2020.  -Hydroxyurea was started back on October 2, 2020 at dose of 500 mg p.o. daily.  - dose of hydroxyurea was increased to 1500 mg p.o. daily on November 20, 2020.  -Due to worsening cytopenia, dose of hydroxyurea was changed to 1500 mg p.o. on Monday Wednesday Friday and rest of the week 1000 mg on January 8, 2021  -Dose of hydroxyurea was changed to 1500 mg p.o. daily on Monday Wednesday and Friday and rest of the week 1000 mg p.o. daily on July 14, 2021            Past Medical History, Past Surgical History, Social History, Family History have been reviewed and are without significant changes except as mentioned.    Review of Systems   A comprehensive 14 point review of systems was performed and was negative except as mentioned in HPI.    Medications:  The current medication list was reviewed in the EMR    ALLERGIES:    Allergies   Allergen Reactions   • Other Rash      Pt was working with walnut wood and broke out in a rash   • Tetracycline Rash       Objective      Vitals:    03/10/23 0836   BP: 151/80   Pulse: 74   Resp: 18   SpO2: 100%   Weight: 89.8 kg (198 lb)   PainSc: 0-No pain     Current Status 7/14/2021   ECOG score 0       Physical Exam  Pulmonary:      Breath sounds: Normal breath sounds.   Neurological:      Mental Status: He is alert and oriented to person, place, and time.           RECENT LABS:  Glucose   Date Value Ref Range Status   03/10/2023 111 (H) 65 - 99 mg/dL Final     Sodium   Date Value Ref Range Status   03/10/2023 142 136 - 145 mmol/L Final     Potassium   Date Value Ref Range Status   03/10/2023 4.3 3.5 - 5.2 mmol/L Final     CO2   Date Value Ref Range Status   03/10/2023 27.0 22.0 - 29.0 mmol/L Final     Chloride   Date Value Ref Range Status   03/10/2023 106 98 - 107 mmol/L Final     Anion Gap   Date Value Ref Range Status   03/10/2023 9.0 5.0 - 15.0 mmol/L Final     Creatinine   Date Value Ref Range Status   03/10/2023 0.80 0.76 - 1.27 mg/dL Final     BUN   Date Value Ref Range Status   03/10/2023 15 8 - 23 mg/dL Final     BUN/Creatinine Ratio   Date Value Ref Range Status   03/10/2023 18.8 7.0 - 25.0 Final     Calcium   Date Value Ref Range Status   03/10/2023 9.1 8.6 - 10.5 mg/dL Final     eGFR Non  Amer   Date Value Ref Range Status   01/12/2022 92 >60 mL/min/1.73 Final     Alkaline Phosphatase   Date Value Ref Range Status   03/10/2023 52 39 - 117 U/L Final     Total Protein   Date Value Ref Range Status   03/10/2023 6.6 6.0 - 8.5 g/dL Final     ALT (SGPT)   Date Value Ref Range Status   03/10/2023 26 1 - 41 U/L Final     AST (SGOT)   Date Value Ref Range Status   03/10/2023 24 1 - 40 U/L Final     Total Bilirubin   Date Value Ref Range Status   03/10/2023 0.9 0.0 - 1.2 mg/dL Final     Albumin   Date Value Ref Range Status   03/10/2023 4.4 3.5 - 5.2 g/dL Final     Globulin   Date Value Ref Range Status   03/10/2023 2.2 gm/dL Final      Lab Results   Component Value Date    WBC 4.12 03/10/2023    HGB 9.3 (L) 03/10/2023    HCT 28.5 (L) 03/10/2023    .6 (H) 03/10/2023     (H) 03/10/2023     Lab Results   Component Value Date    NEUTROABS 2.08 03/10/2023    IRON 109 05/28/2020    IRON 84 02/26/2020    TIBC 301 05/28/2020    TIBC 375 02/26/2020    LABIRON 36 05/28/2020    LABIRON 22 02/26/2020    FERRITIN 687.00 (H) 05/28/2020    FERRITIN 684 (H) 03/23/2020    FERRITIN 776.00 (H) 02/26/2020    LQGLFBXY86 1,160 (H) 10/27/2021    [id number] >2,000 (H) 05/28/2020    FOLATE 11.70 05/28/2020     No results found for: , LABCA2, AFPTM, HCGQUANT, , CHROMGRNA, [id number], CEA, REFLABREPO      PATHOLOGY:  * Cannot find OR log *         RADIOLOGY DATA :  No radiology results for the last 7 days        Assessment & Plan     1.  MPN/MDS overlap syndrome  - Review oncology history for prior treatment details  - Patient is currently on hydroxyurea 1500 mg p.o. Monday and Friday.  Rest of the week patient taking 1000 mg p.o. daily.  - Patient has not been able to tolerate higher dose of Hydrea secondary to worsening anemia and significant fatigue with it.  - CBC done today shows wbc is 4.12, hemoglobin is 9.3, platelet count is 537,000.  - Recommend continue with same dose of hydroxyurea.  - We will have patient return to clinic in 8 weeks with repeat CBC and CMP on that day.  - Patient is being followed by [place] for same.    2.  Anemia and thrombocytosis:  - Secondary to MDS/MPN overlap syndrome plus chemotherapy  - Hemoglobin today 9.3, platelet count is 537,000  - We will monitor with CBC    3.  Vitamin D deficiency  - Patient is currently on vitamin D 2000 international unit p.o. daily.  - Vitamin D level is 28.0 in February 2023.    4.  Health maintenance: Patient does not smoke    5.  Advance care planning:  - CODE STATUS and resuscitation were discussed with patient today.  Patient remains full code.    6.  Prescription: Patient  has enough prescription for hydroxyurea.               PHQ-9 Total Score: 0   -Patient is not homicidal or suicidal.  No acute intervention required.    Marv Messina reports a pain score of 0.  Given his pain assessment as noted, treatment options were discussed and the following options were decided upon as a follow-up plan to address the patient's pain: continuation of current treatment plan for pain.         Vadim Song MD  3/10/2023  18:38 CST        Part of this note may be an electronic transcription/translation of spoken language to printed text using the Dragon Dictation System.          CC:

## 2023-04-03 RX ORDER — HYDROXYUREA 500 MG/1
CAPSULE ORAL
Qty: 68 CAPSULE | Refills: 1 | Status: SHIPPED | OUTPATIENT
Start: 2023-04-03

## 2023-04-03 NOTE — TELEPHONE ENCOUNTER
Rx Refill Note  Requested Prescriptions     Pending Prescriptions Disp Refills   • hydroxyurea (HYDREA) 500 MG capsule 68 capsule 1     Sig: Take 3 tablets by mouth on Monday, Wednesday and Friday.  Rest of the week take 2 tablets p.o. daily      Last office visit with prescribing clinician: 3/10/2023   Last telemedicine visit with prescribing clinician: 5/5/2023   Next office visit with prescribing clinician: 5/5/2023                         Would you like a call back once the refill request has been completed: [] Yes [] No    If the office needs to give you a call back, can they leave a voicemail: [] Yes [] No    Osmany Morejon Rep  04/03/23, 07:58 CDT

## 2023-05-02 RX ORDER — HYDROXYUREA 500 MG/1
CAPSULE ORAL
Qty: 64 CAPSULE | Refills: 1 | Status: SHIPPED | OUTPATIENT
Start: 2023-05-02

## 2023-05-02 NOTE — TELEPHONE ENCOUNTER
Rx Refill Note  Requested Prescriptions     Pending Prescriptions Disp Refills   • hydroxyurea (HYDREA) 500 MG capsule 68 capsule 1     Sig: Take 3 tablets by mouth on Monday, Wednesday and Friday.  Rest of the week take 2 tablets p.o. daily      Last office visit with prescribing clinician: 3/10/2023   Last telemedicine visit with prescribing clinician: 5/5/2023   Next office visit with prescribing clinician: Visit date not found                         Would you like a call back once the refill request has been completed: [] Yes [] No    If the office needs to give you a call back, can they leave a voicemail: [] Yes [] No    Isabela Bucio, Osmany Rep  05/02/23, 07:47 CDT

## 2023-05-04 DIAGNOSIS — D46.9 MDS/MPN (MYELODYSPLASTIC/MYELOPROLIFERATIVE NEOPLASMS): Primary | Chronic | ICD-10-CM

## 2023-05-05 ENCOUNTER — OFFICE VISIT (OUTPATIENT)
Dept: ONCOLOGY | Facility: CLINIC | Age: 68
End: 2023-05-05
Payer: MEDICARE

## 2023-05-05 ENCOUNTER — LAB (OUTPATIENT)
Dept: ONCOLOGY | Facility: HOSPITAL | Age: 68
End: 2023-05-05
Payer: MEDICARE

## 2023-05-05 VITALS
DIASTOLIC BLOOD PRESSURE: 84 MMHG | OXYGEN SATURATION: 100 % | BODY MASS INDEX: 26.71 KG/M2 | SYSTOLIC BLOOD PRESSURE: 158 MMHG | RESPIRATION RATE: 18 BRPM | HEART RATE: 78 BPM | WEIGHT: 197 LBS

## 2023-05-05 DIAGNOSIS — D46.9 MDS/MPN (MYELODYSPLASTIC/MYELOPROLIFERATIVE NEOPLASMS): Primary | Chronic | ICD-10-CM

## 2023-05-05 DIAGNOSIS — D72.829 LEUKOCYTOSIS, UNSPECIFIED TYPE: ICD-10-CM

## 2023-05-05 DIAGNOSIS — D46.9 MDS/MPN (MYELODYSPLASTIC/MYELOPROLIFERATIVE NEOPLASMS): Chronic | ICD-10-CM

## 2023-05-05 DIAGNOSIS — D46.9 MDS/MPN (MYELODYSPLASTIC/MYELOPROLIFERATIVE NEOPLASMS): ICD-10-CM

## 2023-05-05 LAB
ALBUMIN SERPL-MCNC: 4.5 G/DL (ref 3.5–5.2)
ALBUMIN/GLOB SERPL: 2 G/DL
ALP SERPL-CCNC: 54 U/L (ref 39–117)
ALT SERPL W P-5'-P-CCNC: 26 U/L (ref 1–41)
ANION GAP SERPL CALCULATED.3IONS-SCNC: 10 MMOL/L (ref 5–15)
ANISOCYTOSIS BLD QL: ABNORMAL
AST SERPL-CCNC: 24 U/L (ref 1–40)
BASOPHILS # BLD MANUAL: 0.12 10*3/MM3 (ref 0–0.2)
BASOPHILS NFR BLD MANUAL: 3 % (ref 0–1.5)
BILIRUB SERPL-MCNC: 1 MG/DL (ref 0–1.2)
BUN SERPL-MCNC: 11 MG/DL (ref 8–23)
BUN/CREAT SERPL: 14.3 (ref 7–25)
CALCIUM SPEC-SCNC: 9 MG/DL (ref 8.6–10.5)
CHLORIDE SERPL-SCNC: 104 MMOL/L (ref 98–107)
CO2 SERPL-SCNC: 25 MMOL/L (ref 22–29)
CREAT SERPL-MCNC: 0.77 MG/DL (ref 0.76–1.27)
DACRYOCYTES BLD QL SMEAR: ABNORMAL
DEPRECATED RDW RBC AUTO: 104.6 FL (ref 37–54)
EGFRCR SERPLBLD CKD-EPI 2021: 98.1 ML/MIN/1.73
ERYTHROCYTE [DISTWIDTH] IN BLOOD BY AUTOMATED COUNT: 24.9 % (ref 12.3–15.4)
GLOBULIN UR ELPH-MCNC: 2.2 GM/DL
GLUCOSE SERPL-MCNC: 105 MG/DL (ref 65–99)
HCT VFR BLD AUTO: 25.5 % (ref 37.5–51)
HGB BLD-MCNC: 8.4 G/DL (ref 13–17.7)
HOLD SPECIMEN: NORMAL
LYMPHOCYTES # BLD MANUAL: 1.56 10*3/MM3 (ref 0.7–3.1)
LYMPHOCYTES NFR BLD MANUAL: 4 % (ref 5–12)
MACROCYTES BLD QL SMEAR: ABNORMAL
MCH RBC QN AUTO: 40.4 PG (ref 26.6–33)
MCHC RBC AUTO-ENTMCNC: 32.9 G/DL (ref 31.5–35.7)
MCV RBC AUTO: 122.6 FL (ref 79–97)
MONOCYTES # BLD: 0.16 10*3/MM3 (ref 0.1–0.9)
NEUTROPHILS # BLD AUTO: 2.15 10*3/MM3 (ref 1.7–7)
NEUTROPHILS NFR BLD MANUAL: 51 % (ref 42.7–76)
NEUTS BAND NFR BLD MANUAL: 3 % (ref 0–5)
OVALOCYTES BLD QL SMEAR: ABNORMAL
PLATELET # BLD AUTO: 429 10*3/MM3 (ref 140–450)
PMV BLD AUTO: 10.9 FL (ref 6–12)
POIKILOCYTOSIS BLD QL SMEAR: ABNORMAL
POTASSIUM SERPL-SCNC: 4 MMOL/L (ref 3.5–5.2)
PROT SERPL-MCNC: 6.7 G/DL (ref 6–8.5)
RBC # BLD AUTO: 2.08 10*6/MM3 (ref 4.14–5.8)
SMALL PLATELETS BLD QL SMEAR: ADEQUATE
SODIUM SERPL-SCNC: 139 MMOL/L (ref 136–145)
STOMATOCYTES BLD QL SMEAR: ABNORMAL
VARIANT LYMPHS NFR BLD MANUAL: 39 % (ref 19.6–45.3)
WBC MORPH BLD: NORMAL
WBC NRBC COR # BLD: 3.99 10*3/MM3 (ref 3.4–10.8)

## 2023-05-05 PROCEDURE — 85007 BL SMEAR W/DIFF WBC COUNT: CPT | Performed by: NURSE PRACTITIONER

## 2023-05-05 PROCEDURE — 85025 COMPLETE CBC W/AUTO DIFF WBC: CPT | Performed by: NURSE PRACTITIONER

## 2023-05-05 PROCEDURE — 80053 COMPREHEN METABOLIC PANEL: CPT | Performed by: NURSE PRACTITIONER

## 2023-05-10 NOTE — PROGRESS NOTES
DATE OF VISIT: 5/5/2023      REASON FOR VISIT:  MPN/MDS overlap syndrome, anemia, thrombocytosis        HISTORY OF PRESENT ILLNESS:   67-year-old male with medical problem consisting of hypertension, MPN/MDS overlap syndrome for which patient is currently on Hydrea, anemia, thrombocytosis is here for follow-up appointment today.  Denies any recent worsening of fatigue.  Denies any ankle ulceration.  Denies any excessive nausea or vomiting or diarrhea with treatment.  Denies any new chest pain or shortness of breath.  Denies any bleeding.  He is currently taking 1500 mg Hydrea on Monday and Friday and the other days 1000 mg.                 Oncology history:     1.  MPN/MDS overlap syndrome:  -Bone marrow biopsy done at [place] on March 23, 2020 is consistent with MPN/MDS overlap syndrome.  - Patient was on hydroxyurea until September 17, 2020 due to severe anemia with hemoglobin of 7.3 and neutropenia with absolute neutrophil count of 1.45 hydroxyurea has been discontinued.  - CBC done on September 25, 2020 shows white blood cell count is 3.97, hemoglobin is 7.7, platelet count is 432,000.     -Hydroxyurea was discontinued on September 17 2020.  -Hydroxyurea was started back on October 2, 2020 at dose of 500 mg p.o. daily.  - dose of hydroxyurea was increased to 1500 mg p.o. daily on November 20, 2020.  -Due to worsening cytopenia, dose of hydroxyurea was changed to 1500 mg p.o. on Monday Wednesday Friday and rest of the week 1000 mg on January 8, 2021  -Dose of hydroxyurea was changed to 1500 mg p.o. daily on Monday Wednesday and Friday and rest of the week 1000 mg p.o. daily on July 14, 2021              Past Medical History, Past Surgical History, Social History, Family History have been reviewed and are without significant changes except as mentioned.    Review of Systems   A comprehensive 14 point review of systems was performed and was negative except as mentioned.    Medications:  The current medication  list was reviewed in the EMR    ALLERGIES:    Allergies   Allergen Reactions   • Other Rash     Pt was working with walnut wood and broke out in a rash   • Tetracycline Rash       Objective      Vitals:    05/05/23 0813   BP: 158/84   Pulse: 78   Resp: 18   SpO2: 100%   Weight: 89.4 kg (197 lb)   PainSc: 0-No pain         7/14/2021     1:12 PM   Current Status   ECOG score 0       Physical Exam  General:alert and oriented no acute distress  Lungs;normal breath sounds  Card:RRR  Ext; no edema    RECENT LABS:  Glucose   Date Value Ref Range Status   05/05/2023 105 (H) 65 - 99 mg/dL Final     Sodium   Date Value Ref Range Status   05/05/2023 139 136 - 145 mmol/L Final     Potassium   Date Value Ref Range Status   05/05/2023 4.0 3.5 - 5.2 mmol/L Final     CO2   Date Value Ref Range Status   05/05/2023 25.0 22.0 - 29.0 mmol/L Final     Chloride   Date Value Ref Range Status   05/05/2023 104 98 - 107 mmol/L Final     Anion Gap   Date Value Ref Range Status   05/05/2023 10.0 5.0 - 15.0 mmol/L Final     Creatinine   Date Value Ref Range Status   05/05/2023 0.77 0.76 - 1.27 mg/dL Final     BUN   Date Value Ref Range Status   05/05/2023 11 8 - 23 mg/dL Final     BUN/Creatinine Ratio   Date Value Ref Range Status   05/05/2023 14.3 7.0 - 25.0 Final     Calcium   Date Value Ref Range Status   05/05/2023 9.0 8.6 - 10.5 mg/dL Final     eGFR Non  Amer   Date Value Ref Range Status   01/12/2022 92 >60 mL/min/1.73 Final     Alkaline Phosphatase   Date Value Ref Range Status   05/05/2023 54 39 - 117 U/L Final     Total Protein   Date Value Ref Range Status   05/05/2023 6.7 6.0 - 8.5 g/dL Final     ALT (SGPT)   Date Value Ref Range Status   05/05/2023 26 1 - 41 U/L Final     AST (SGOT)   Date Value Ref Range Status   05/05/2023 24 1 - 40 U/L Final     Total Bilirubin   Date Value Ref Range Status   05/05/2023 1.0 0.0 - 1.2 mg/dL Final     Albumin   Date Value Ref Range Status   05/05/2023 4.5 3.5 - 5.2 g/dL Final     Globulin    Date Value Ref Range Status   05/05/2023 2.2 gm/dL Final     Lab Results   Component Value Date    WBC 3.99 05/05/2023    HGB 8.4 (L) 05/05/2023    HCT 25.5 (L) 05/05/2023    .6 (H) 05/05/2023     05/05/2023     Lab Results   Component Value Date    NEUTROABS 2.15 05/05/2023    IRON 109 05/28/2020    IRON 84 02/26/2020    TIBC 301 05/28/2020    TIBC 375 02/26/2020    LABIRON 36 05/28/2020    LABIRON 22 02/26/2020    FERRITIN 687.00 (H) 05/28/2020    FERRITIN 684 (H) 03/23/2020    FERRITIN 776.00 (H) 02/26/2020    [id number] 1,160 (H) 10/27/2021    [id number] >2,000 (H) 05/28/2020    FOLATE 11.70 05/28/2020     No results found for: , LABCA2, AFPTM, HCGQUANT, , CHROMGRNA, [id number], CEA, REFLABREPO      RADIOLOGY DATA :  No radiology results for the last 7 days        Assessment & Plan     1.  MPN/MDS overlap syndrome  - Review oncology history for prior treatment details  - Patient is currently on hydroxyurea 1500 mg p.o. Monday and Friday.  Rest of the week patient taking 1000 mg p.o. daily.  - labs were reviewed today and WBC is 3.99 and Hgb is 8.4 with platelet count 429,000; due to his dropping Hgb will decrease his Hydrea to 1000 mg daily; he has been instructed not take 1500 mg on Monday and Friday; pt voices understanding.  -RTC 6 weeks with CBC and CMP on that day  - Patient is being followed by [place] for same.     2.  Anemia and thrombocytosis:  - Secondary to MDS/MPN overlap syndrome plus chemotherapy  - Hemoglobin today 8.4, platelet count is 429,000  - We will monitor with CBC     3.  Vitamin D deficiency  - Patient is currently on vitamin D 2000 international unit p.o. daily.  - Vitamin D level is 28.0 in February 2023.     4.  Health maintenance: Patient does not smoke     5.  Advance care planning:  - CODE STATUS and resuscitation were discussed with patient today.  Patient remains full code.     6.  Prescription: Patient has enough prescription for hydroxyurea.                PHQ-9 Total Score: 0     Marv Messina reports a pain score of 0.  Given his pain assessment as noted, treatment options were discussed and the following options were decided upon as a follow-up plan to address the patient's pain: no pain today.         Zaynab Mccormick, JUNAID  5/10/2023  14:31 CDT        Part of this note may be an electronic transcription/translation of spoken language to printed text using the Dragon Dictation System.          CC:

## 2023-06-06 RX ORDER — HYDROXYUREA 500 MG/1
CAPSULE ORAL
Qty: 64 CAPSULE | Refills: 1 | Status: SHIPPED | OUTPATIENT
Start: 2023-06-06

## 2023-06-06 NOTE — TELEPHONE ENCOUNTER
Rx Refill Note  Requested Prescriptions     Pending Prescriptions Disp Refills    hydroxyurea (HYDREA) 500 MG capsule 64 capsule 1     Sig: Take 3 tablets by mouth on Monday and Friday.  Rest of the week take 2 tablets p.o. daily      Last office visit with prescribing clinician: 3/10/2023   Last telemedicine visit with prescribing clinician: Visit date not found   Next office visit with prescribing clinician: 6/16/2023                         Would you like a call back once the refill request has been completed: [] Yes [] No    If the office needs to give you a call back, can they leave a voicemail: [] Yes [] No    Osmany Morejon Rep  06/06/23, 08:08 CDT

## 2023-06-16 ENCOUNTER — OFFICE VISIT (OUTPATIENT)
Dept: ONCOLOGY | Facility: CLINIC | Age: 68
End: 2023-06-16
Payer: MEDICARE

## 2023-06-16 ENCOUNTER — LAB (OUTPATIENT)
Dept: ONCOLOGY | Facility: HOSPITAL | Age: 68
End: 2023-06-16
Payer: MEDICARE

## 2023-06-16 VITALS
SYSTOLIC BLOOD PRESSURE: 184 MMHG | OXYGEN SATURATION: 99 % | DIASTOLIC BLOOD PRESSURE: 78 MMHG | BODY MASS INDEX: 26.74 KG/M2 | WEIGHT: 197.2 LBS | HEART RATE: 86 BPM | TEMPERATURE: 97.5 F

## 2023-06-16 DIAGNOSIS — D75.839 THROMBOCYTOSIS: Chronic | ICD-10-CM

## 2023-06-16 DIAGNOSIS — D46.9 MDS/MPN (MYELODYSPLASTIC/MYELOPROLIFERATIVE NEOPLASMS): ICD-10-CM

## 2023-06-16 DIAGNOSIS — Z51.81 ENCOUNTER FOR MONITORING OF HYDROXYUREA THERAPY: Chronic | ICD-10-CM

## 2023-06-16 DIAGNOSIS — D46.9 MDS/MPN (MYELODYSPLASTIC/MYELOPROLIFERATIVE NEOPLASMS): Primary | Chronic | ICD-10-CM

## 2023-06-16 DIAGNOSIS — R53.83 OTHER FATIGUE: Chronic | ICD-10-CM

## 2023-06-16 DIAGNOSIS — D64.9 ANEMIA, UNSPECIFIED TYPE: Chronic | ICD-10-CM

## 2023-06-16 DIAGNOSIS — Z79.64 ENCOUNTER FOR MONITORING OF HYDROXYUREA THERAPY: Chronic | ICD-10-CM

## 2023-06-16 LAB
ALBUMIN SERPL-MCNC: 4.4 G/DL (ref 3.5–5.2)
ALBUMIN/GLOB SERPL: 1.8 G/DL
ALP SERPL-CCNC: 60 U/L (ref 39–117)
ALT SERPL W P-5'-P-CCNC: 24 U/L (ref 1–41)
ANION GAP SERPL CALCULATED.3IONS-SCNC: 11 MMOL/L (ref 5–15)
AST SERPL-CCNC: 24 U/L (ref 1–40)
BASOPHILS # BLD AUTO: 0.04 10*3/MM3 (ref 0–0.2)
BASOPHILS NFR BLD AUTO: 0.8 % (ref 0–1.5)
BILIRUB SERPL-MCNC: 1.1 MG/DL (ref 0–1.2)
BUN SERPL-MCNC: 12 MG/DL (ref 8–23)
BUN/CREAT SERPL: 14.5 (ref 7–25)
CALCIUM SPEC-SCNC: 9.3 MG/DL (ref 8.6–10.5)
CHLORIDE SERPL-SCNC: 103 MMOL/L (ref 98–107)
CO2 SERPL-SCNC: 24 MMOL/L (ref 22–29)
CREAT SERPL-MCNC: 0.83 MG/DL (ref 0.76–1.27)
DEPRECATED RDW RBC AUTO: 108.9 FL (ref 37–54)
EGFRCR SERPLBLD CKD-EPI 2021: 95.9 ML/MIN/1.73
EOSINOPHIL # BLD AUTO: 0.09 10*3/MM3 (ref 0–0.4)
EOSINOPHIL NFR BLD AUTO: 1.9 % (ref 0.3–6.2)
ERYTHROCYTE [DISTWIDTH] IN BLOOD BY AUTOMATED COUNT: 24.7 % (ref 12.3–15.4)
GLOBULIN UR ELPH-MCNC: 2.4 GM/DL
GLUCOSE SERPL-MCNC: 110 MG/DL (ref 65–99)
HCT VFR BLD AUTO: 26.1 % (ref 37.5–51)
HGB BLD-MCNC: 8.8 G/DL (ref 13–17.7)
HOLD SPECIMEN: NORMAL
IMM GRANULOCYTES # BLD AUTO: 0.06 10*3/MM3 (ref 0–0.05)
IMM GRANULOCYTES NFR BLD AUTO: 1.2 % (ref 0–0.5)
LYMPHOCYTES # BLD AUTO: 1.47 10*3/MM3 (ref 0.7–3.1)
LYMPHOCYTES NFR BLD AUTO: 30.2 % (ref 19.6–45.3)
MACROCYTES BLD QL SMEAR: NORMAL
MCH RBC QN AUTO: 42.1 PG (ref 26.6–33)
MCHC RBC AUTO-ENTMCNC: 33.7 G/DL (ref 31.5–35.7)
MCV RBC AUTO: 124.9 FL (ref 79–97)
MONOCYTES # BLD AUTO: 0.39 10*3/MM3 (ref 0.1–0.9)
MONOCYTES NFR BLD AUTO: 8 % (ref 5–12)
NEUTROPHILS NFR BLD AUTO: 2.81 10*3/MM3 (ref 1.7–7)
NEUTROPHILS NFR BLD AUTO: 57.9 % (ref 42.7–76)
NRBC BLD AUTO-RTO: 1.9 /100 WBC (ref 0–0.2)
PLATELET # BLD AUTO: 562 10*3/MM3 (ref 140–450)
PMV BLD AUTO: 11.1 FL (ref 6–12)
POTASSIUM SERPL-SCNC: 4.6 MMOL/L (ref 3.5–5.2)
PROT SERPL-MCNC: 6.8 G/DL (ref 6–8.5)
RBC # BLD AUTO: 2.09 10*6/MM3 (ref 4.14–5.8)
SMALL PLATELETS BLD QL SMEAR: NORMAL
SODIUM SERPL-SCNC: 138 MMOL/L (ref 136–145)
WBC MORPH BLD: NORMAL
WBC NRBC COR # BLD: 4.86 10*3/MM3 (ref 3.4–10.8)

## 2023-06-16 PROCEDURE — 36415 COLL VENOUS BLD VENIPUNCTURE: CPT

## 2023-06-16 PROCEDURE — 85007 BL SMEAR W/DIFF WBC COUNT: CPT

## 2023-06-16 PROCEDURE — 80053 COMPREHEN METABOLIC PANEL: CPT

## 2023-06-16 PROCEDURE — 85025 COMPLETE CBC W/AUTO DIFF WBC: CPT

## 2023-06-16 NOTE — PROGRESS NOTES
DATE OF VISIT: 6/16/2023      REASON FOR VISIT: MPN/MDS overlap syndrome, anemia, thrombocytosis      HISTORY OF PRESENT ILLNESS:   68-year-old male with medical problem consisting of hypertension, MPN/MDS overlap syndrome for which patient is currently on hydroxyurea, anemia, thrombocytosis is here for follow-up appointment today.  Complains of intermittent back spasm for which she is taking ibuprofen intermittently.  Denies any recent worsening of fatigue.  Denies any ankle ulceration.  Denies any excessive nausea or vomiting or diarrhea with treatment.  Denies any new chest pain or shortness of breath.  Denies any bleeding.            Oncology history:    1.  MPN/MDS overlap syndrome:  -Bone marrow biopsy done at [place] on March 23, 2020 is consistent with MPN/MDS overlap syndrome.  - Patient was on hydroxyurea until September 17, 2020 due to severe anemia with hemoglobin of 7.3 and neutropenia with absolute neutrophil count of 1.45 hydroxyurea has been discontinued.  - CBC done on September 25, 2020 shows white blood cell count is 3.97, hemoglobin is 7.7, platelet count is 432,000.     -Hydroxyurea was discontinued on September 17 2020.  -Hydroxyurea was started back on October 2, 2020 at dose of 500 mg p.o. daily.  - dose of hydroxyurea was increased to 1500 mg p.o. daily on November 20, 2020.  -Due to worsening cytopenia, dose of hydroxyurea was changed to 1500 mg p.o. on Monday Wednesday Friday and rest of the week 1000 mg on January 8, 2021  -Dose of hydroxyurea was changed to 1500 mg p.o. daily on Monday Wednesday and Friday and rest of the week 1000 mg p.o. daily on July 14, 2021            Past Medical History, Past Surgical History, Social History, Family History have been reviewed and are without significant changes except as mentioned.    Review of Systems   A comprehensive 14 point review of systems was performed and was negative except as mentioned in HPI.    Medications:  The current medication  list was reviewed in the EMR    ALLERGIES:    Allergies   Allergen Reactions    Other Rash     Pt was working with walnut wood and broke out in a rash    Tetracycline Rash       Objective      Vitals:    06/16/23 0942   BP: (!) 184/78   Pulse: 86   Temp: 97.5 °F (36.4 °C)   TempSrc: Temporal   SpO2: 99%   Weight: 89.4 kg (197 lb 3.2 oz)   PainSc: 0-No pain         7/14/2021     1:12 PM   Current Status   ECOG score 0       Physical Exam  Pulmonary:      Breath sounds: Normal breath sounds.   Neurological:      Mental Status: He is alert and oriented to person, place, and time.         RECENT LABS:  Glucose   Date Value Ref Range Status   06/16/2023 110 (H) 65 - 99 mg/dL Final     Sodium   Date Value Ref Range Status   06/16/2023 138 136 - 145 mmol/L Final     Potassium   Date Value Ref Range Status   06/16/2023 4.6 3.5 - 5.2 mmol/L Final     CO2   Date Value Ref Range Status   06/16/2023 24.0 22.0 - 29.0 mmol/L Final     Chloride   Date Value Ref Range Status   06/16/2023 103 98 - 107 mmol/L Final     Anion Gap   Date Value Ref Range Status   06/16/2023 11.0 5.0 - 15.0 mmol/L Final     Creatinine   Date Value Ref Range Status   06/16/2023 0.83 0.76 - 1.27 mg/dL Final     BUN   Date Value Ref Range Status   06/16/2023 12 8 - 23 mg/dL Final     BUN/Creatinine Ratio   Date Value Ref Range Status   06/16/2023 14.5 7.0 - 25.0 Final     Calcium   Date Value Ref Range Status   06/16/2023 9.3 8.6 - 10.5 mg/dL Final     eGFR Non  Amer   Date Value Ref Range Status   01/12/2022 92 >60 mL/min/1.73 Final     Alkaline Phosphatase   Date Value Ref Range Status   06/16/2023 60 39 - 117 U/L Final     Total Protein   Date Value Ref Range Status   06/16/2023 6.8 6.0 - 8.5 g/dL Final     ALT (SGPT)   Date Value Ref Range Status   06/16/2023 24 1 - 41 U/L Final     AST (SGOT)   Date Value Ref Range Status   06/16/2023 24 1 - 40 U/L Final     Total Bilirubin   Date Value Ref Range Status   06/16/2023 1.1 0.0 - 1.2 mg/dL Final      Albumin   Date Value Ref Range Status   06/16/2023 4.4 3.5 - 5.2 g/dL Final     Globulin   Date Value Ref Range Status   06/16/2023 2.4 gm/dL Final     Lab Results   Component Value Date    WBC 4.86 06/16/2023    HGB 8.8 (L) 06/16/2023    HCT 26.1 (L) 06/16/2023    .9 (H) 06/16/2023     (H) 06/16/2023     Lab Results   Component Value Date    NEUTROABS 2.81 06/16/2023    IRON 109 05/28/2020    IRON 84 02/26/2020    TIBC 301 05/28/2020    TIBC 375 02/26/2020    LABIRON 36 05/28/2020    LABIRON 22 02/26/2020    FERRITIN 687.00 (H) 05/28/2020    FERRITIN 684 (H) 03/23/2020    FERRITIN 776.00 (H) 02/26/2020    [id number] 1,160 (H) 10/27/2021    [id number] >2,000 (H) 05/28/2020    FOLATE 11.70 05/28/2020     No results found for: , LABCA2, AFPTM, HCGQUANT, , CHROMGRNA, [id number], CEA, REFLABREPO      PATHOLOGY:  * Cannot find OR log *         RADIOLOGY DATA :  No radiology results for the last 7 days        Assessment & Plan     1.  MPN/MDS overlap syndrome  - Review oncology history for prior treatment details  - Due to worsening anemia, dose of hydroxyurea has been decreased to 1000 mg p.o. daily on May 5, 2023.  - Patient has not been able to tolerate any higher dose of hydroxyurea secondary to worsening anemia and significant fatigue with it.  - CBC done today shows white blood cell count is 4.86, hemoglobin is 8.8, platelet count is 562,000  - Recommend continuing with same dose of hydroxyurea for now  - We will have patient return to clinic in 8 weeks with repeat CBC and CMP on that day.  - Patient has been followed at [place] as well.    2.  Anemia and thrombocytosis:  - Secondary to MDS/MPN overlap syndrome plus chemotherapy  - Hemoglobin is 8.8 platelet count is 562,000  - We will monitor with CBC    3.  Vitamin D deficiency:  - Patient is currently on vitamin D 2000 international unit p.o. daily.    4.  Health maintenance: Patient does not smoke    5.  Advance care planning:  -  Patient remains full code.    6.  Prescriptions: Patient has enough prescription for hydroxyurea.                     PHQ-9 Total Score: 0   -Patient is not homicidal or suicidal.  No acute intervention required.     Marv Messina reports a pain score of 0.  Given his pain assessment as noted, treatment options were discussed and the following options were decided upon as a follow-up plan to address the patient's pain: continuation of current treatment plan for pain.         Vadim Song MD  6/16/2023  17:10 CDT        Part of this note may be an electronic transcription/translation of spoken language to printed text using the Dragon Dictation System.          CC:

## 2023-08-08 RX ORDER — HYDROXYUREA 500 MG/1
CAPSULE ORAL
Qty: 64 CAPSULE | Refills: 1 | Status: SHIPPED | OUTPATIENT
Start: 2023-08-08

## 2023-08-08 NOTE — TELEPHONE ENCOUNTER
Rx Refill Note  Requested Prescriptions     Pending Prescriptions Disp Refills    hydroxyurea (HYDREA) 500 MG capsule 64 capsule 1     Sig: Take 3 tablets by mouth on Monday and Friday.  Rest of the week take 2 tablets p.o. daily      Last office visit with prescribing clinician: 6/16/2023   Last telemedicine visit with prescribing clinician: Visit date not found   Next office visit with prescribing clinician: 8/11/2023                         Would you like a call back once the refill request has been completed: [] Yes [] No    If the office needs to give you a call back, can they leave a voicemail: [] Yes [] No    Batsheva Andrade, ROGER  08/08/23, 08:16 CDT

## 2023-08-11 ENCOUNTER — OFFICE VISIT (OUTPATIENT)
Dept: ONCOLOGY | Facility: CLINIC | Age: 68
End: 2023-08-11
Payer: MEDICARE

## 2023-08-11 ENCOUNTER — LAB (OUTPATIENT)
Dept: ONCOLOGY | Facility: HOSPITAL | Age: 68
End: 2023-08-11
Payer: MEDICARE

## 2023-08-11 VITALS
SYSTOLIC BLOOD PRESSURE: 163 MMHG | WEIGHT: 192.6 LBS | BODY MASS INDEX: 26.12 KG/M2 | DIASTOLIC BLOOD PRESSURE: 72 MMHG | OXYGEN SATURATION: 99 % | HEART RATE: 80 BPM

## 2023-08-11 DIAGNOSIS — Z51.81 ENCOUNTER FOR MONITORING OF HYDROXYUREA THERAPY: ICD-10-CM

## 2023-08-11 DIAGNOSIS — R53.83 OTHER FATIGUE: ICD-10-CM

## 2023-08-11 DIAGNOSIS — D75.839 THROMBOCYTOSIS: ICD-10-CM

## 2023-08-11 DIAGNOSIS — Z79.64 ENCOUNTER FOR MONITORING OF HYDROXYUREA THERAPY: ICD-10-CM

## 2023-08-11 DIAGNOSIS — D64.9 ANEMIA, UNSPECIFIED TYPE: Primary | ICD-10-CM

## 2023-08-11 DIAGNOSIS — D46.9 MDS/MPN (MYELODYSPLASTIC/MYELOPROLIFERATIVE NEOPLASMS): ICD-10-CM

## 2023-08-11 LAB
ANISOCYTOSIS BLD QL: ABNORMAL
DACRYOCYTES BLD QL SMEAR: ABNORMAL
DEPRECATED RDW RBC AUTO: 96 FL (ref 37–54)
ELLIPTOCYTES BLD QL SMEAR: ABNORMAL
ERYTHROCYTE [DISTWIDTH] IN BLOOD BY AUTOMATED COUNT: 24.3 % (ref 12.3–15.4)
HCT VFR BLD AUTO: 28.9 % (ref 37.5–51)
HGB BLD-MCNC: 9.5 G/DL (ref 13–17.7)
HYPOCHROMIA BLD QL: ABNORMAL
LYMPHOCYTES # BLD MANUAL: 1.49 10*3/MM3 (ref 0.7–3.1)
LYMPHOCYTES NFR BLD MANUAL: 4 % (ref 5–12)
MCH RBC QN AUTO: 38.2 PG (ref 26.6–33)
MCHC RBC AUTO-ENTMCNC: 32.9 G/DL (ref 31.5–35.7)
MCV RBC AUTO: 116.1 FL (ref 79–97)
MONOCYTES # BLD: 0.23 10*3/MM3 (ref 0.1–0.9)
NEUTROPHILS # BLD AUTO: 4.02 10*3/MM3 (ref 1.7–7)
NEUTROPHILS NFR BLD MANUAL: 67 % (ref 42.7–76)
NEUTS BAND NFR BLD MANUAL: 3 % (ref 0–5)
OVALOCYTES BLD QL SMEAR: ABNORMAL
PLATELET # BLD AUTO: 702 10*3/MM3 (ref 140–450)
PMV BLD AUTO: 10.2 FL (ref 6–12)
RBC # BLD AUTO: 2.49 10*6/MM3 (ref 4.14–5.8)
SMALL PLATELETS BLD QL SMEAR: ABNORMAL
VARIANT LYMPHS NFR BLD MANUAL: 26 % (ref 19.6–45.3)
WBC MORPH BLD: NORMAL
WBC NRBC COR # BLD: 5.74 10*3/MM3 (ref 3.4–10.8)

## 2023-08-11 PROCEDURE — 85025 COMPLETE CBC W/AUTO DIFF WBC: CPT | Performed by: INTERNAL MEDICINE

## 2023-08-11 PROCEDURE — 82668 ASSAY OF ERYTHROPOIETIN: CPT | Performed by: INTERNAL MEDICINE

## 2023-08-11 PROCEDURE — 85007 BL SMEAR W/DIFF WBC COUNT: CPT | Performed by: INTERNAL MEDICINE

## 2023-08-11 NOTE — PROGRESS NOTES
DATE OF VISIT: 8/11/2023      REASON FOR VISIT: MPN/MDS overlap syndrome, anemia, thrombocytosis      HISTORY OF PRESENT ILLNESS:   68-year-old male with medical problems significant for hypertension, MPN/MDS overlap syndrome for which patient is currently on hydroxyurea, anemia, thrombocytosis is here for follow-up appointment today.  Complains of intermittent fatigue.  Denies any ankle ulceration.  Denies any new shortness of breath.  Denies any bleeding.                Oncology history:    1.  MPN/MDS overlap syndrome:  -Bone marrow biopsy done at [facility] on March 23, 2020 is consistent with MPN/MDS overlap syndrome.  - Patient was on hydroxyurea until September 17, 2020 due to severe anemia with hemoglobin of 7.3 and neutropenia with absolute neutrophil count of 1.45 hydroxyurea has been discontinued.  - CBC done on September 25, 2020 shows white blood cell count is 3.97, hemoglobin is 7.7, platelet count is 432,000.     -Hydroxyurea was discontinued on September 17 2020.  -Hydroxyurea was started back on October 2, 2020 at dose of 500 mg p.o. daily.  - dose of hydroxyurea was increased to 1500 mg p.o. daily on November 20, 2020.  -Due to worsening cytopenia, dose of hydroxyurea was changed to 1500 mg p.o. on Monday Wednesday Friday and rest of the week 1000 mg on January 8, 2021  -Dose of hydroxyurea was changed to 1500 mg p.o. daily on Monday Wednesday and Friday and rest of the week 1000 mg p.o. daily on July 14, 2021        Past Medical History, Past Surgical History, Social History, Family History have been reviewed and are without significant changes except as mentioned.    Review of Systems   A comprehensive 14 point review of systems was performed and was negative except as mentioned in HPI.    Medications:  The current medication list was reviewed in the EMR    ALLERGIES:    Allergies   Allergen Reactions    Other Rash     Pt was working with walnut wood and broke out in a rash    Tetracycline Rash        Objective      Vitals:    08/11/23 0904   BP: 163/72   Pulse: 80   SpO2: 99%   Weight: 87.4 kg (192 lb 9.6 oz)   PainSc: 0-No pain         7/14/2021     1:12 PM   Current Status   ECOG score 0       Physical Exam  Pulmonary:      Breath sounds: Normal breath sounds.   Neurological:      Mental Status: He is alert and oriented to person, place, and time.         RECENT LABS:  Glucose   Date Value Ref Range Status   06/16/2023 110 (H) 65 - 99 mg/dL Final     Sodium   Date Value Ref Range Status   06/16/2023 138 136 - 145 mmol/L Final     Potassium   Date Value Ref Range Status   06/16/2023 4.6 3.5 - 5.2 mmol/L Final     CO2   Date Value Ref Range Status   06/16/2023 24.0 22.0 - 29.0 mmol/L Final     Chloride   Date Value Ref Range Status   06/16/2023 103 98 - 107 mmol/L Final     Anion Gap   Date Value Ref Range Status   06/16/2023 11.0 5.0 - 15.0 mmol/L Final     Creatinine   Date Value Ref Range Status   06/16/2023 0.83 0.76 - 1.27 mg/dL Final     BUN   Date Value Ref Range Status   06/16/2023 12 8 - 23 mg/dL Final     BUN/Creatinine Ratio   Date Value Ref Range Status   06/16/2023 14.5 7.0 - 25.0 Final     Calcium   Date Value Ref Range Status   06/16/2023 9.3 8.6 - 10.5 mg/dL Final     eGFR Non  Amer   Date Value Ref Range Status   01/12/2022 92 >60 mL/min/1.73 Final     Alkaline Phosphatase   Date Value Ref Range Status   06/16/2023 60 39 - 117 U/L Final     Total Protein   Date Value Ref Range Status   06/16/2023 6.8 6.0 - 8.5 g/dL Final     ALT (SGPT)   Date Value Ref Range Status   06/16/2023 24 1 - 41 U/L Final     AST (SGOT)   Date Value Ref Range Status   06/16/2023 24 1 - 40 U/L Final     Total Bilirubin   Date Value Ref Range Status   06/16/2023 1.1 0.0 - 1.2 mg/dL Final     Albumin   Date Value Ref Range Status   06/16/2023 4.4 3.5 - 5.2 g/dL Final     Globulin   Date Value Ref Range Status   06/16/2023 2.4 gm/dL Final     Lab Results   Component Value Date    WBC 5.74 08/11/2023    HGB 9.5  (L) 08/11/2023    HCT 28.9 (L) 08/11/2023    .1 (H) 08/11/2023     (H) 08/11/2023     Lab Results   Component Value Date    NEUTROABS 4.02 08/11/2023    IRON 78 08/09/2023    IRON 109 05/28/2020    IRON 84 02/26/2020    TIBC 241 (L) 08/09/2023    TIBC 301 05/28/2020    TIBC 375 02/26/2020    LABIRON 32 08/09/2023    LABIRON 36 05/28/2020    LABIRON 22 02/26/2020    FERRITIN 1,115 (H) 08/09/2023    FERRITIN 687.00 (H) 05/28/2020    FERRITIN 684 (H) 03/23/2020    REBAKJEI95 1,084 (H) 08/09/2023    DWWFFTFH51 1,160 (H) 10/27/2021    UFRWYNNS48 >2,000 (H) 05/28/2020    FOLATE 8.5 08/09/2023    FOLATE 11.70 05/28/2020     No results found for: , LABCA2, AFPTM, HCGQUANT, , CHROMGRNA, 3OPCE27GLE, CEA, REFLABREPO      CBC AND DIFFERENTIAL  Order: [id number]  Component  Ref Range & Units 2 d ago   White Blood Cells  3.9 - 10.7 x10(3)/mcL 3.0 Low    Red Blood Cells  4.50 - 6.00 x10(6)/mcL 2.35 Low    Hemoglobin  14.0 - 18.1 gm/dL 9.1 Low    Hematocrit  41 - 49 % 28 Low    MCV  81 - 98 fL 117 High    MCH  27.0 - 32.0 pg 38.7 High    Mean Cell Hemoglobin Concentration  31.0 - 35.0 gm/dL 33.1   RDW  37.4 - 52.4 fL 98.0 High    RDW  11.1 - 14.3 % 23.6 High    Platelet  135 - 371 x10(3)/mcL 579 High    MPV  9.3 - 12.8 fL 10.5   Nucleated RBC  0 - 0 /100 WBC 1 High    Nucleated RBC Abs  0.00 - 0.00 x10(3)/mcL 0.04 High    Auto Neutrophil Absolute  1.60 - 8.10 x10(3)/mcL 1.78   Resulting Agency [facility]   Specimen Collected: 08/09/23 10:54 Last Resulted: 08/09/23 21:46   Received From: [facility]  Result Received: 08/11/23 09:59    View Encounter        Received Information    Specimen Date Taken Specimen Time Taken Specimen Received Date             PATHOLOGY:  * Cannot find OR log *         RADIOLOGY DATA :  No radiology results for the last 7 days        Assessment & Plan     1.  MPN/MDS overlap syndrome  - Review oncology history for prior treatment details  - Due to  worsening anemia hydroxyurea has been decreased to 1000 mg p.o. daily since May 5, 2023  - Patient has not been able to tolerate any higher dose of hydroxyurea due to worsening anemia in the past  - CBC done today shows white blood cell count is 5.74, hemoglobin is 9.5, platelet count is 702,000.  - Due to rising platelet count recommend increasing hydroxyurea to 1500 mg on Monday and Thursday, recommend taking hydroxyurea 1000 mg p.o. rest of the week.  - Recommend returning to clinic in 8 weeks with repeat CBC and CMP on that day  - Patient was recently evaluated at [facility] on August 9, 2023.  Records were obtained and reviewed.    2.  Anemia and thrombocytosis:  - Secondary to MDS and MPN component of bone marrow pathology  - Hemoglobin is 9.5  - [facility] team recommended starting patient on Aranesp.  Side effect of Aranesp including but not limited to risk of DVT/pulmonary embolism, fluid retention were discussed with patient.  Patient at this point does not want to take Aranesp.  - We will check erythropoietin level today to see if his level is low enough to start Aranesp in future if required.    3.  Vitamin D deficiency  - Remains on vitamin D 2000 international unit p.o. daily    4.  Health maintenance: Patient does not smoke    5.  Advance care planning:  - Patient remains full code.    6.  Prescriptions: Patient has enough prescription for hydroxyurea.                     PHQ-9 Total Score: 0   -Patient is not homicidal or suicidal.  No acute intervention required.     Marv Messina reports a pain score of 0.  Given his pain assessment as noted, treatment options were discussed and the following options were decided upon as a follow-up plan to address the patient's pain: continuation of current treatment plan for pain.         Vadim Song MD  8/11/2023  14:34 CDT        Part of this note may be an electronic transcription/translation of spoken language to printed text using the Dragon  Dictation System.          CC:

## 2023-08-15 LAB — EPO SERPL-ACNC: 91.2 MIU/ML (ref 2.6–18.5)

## 2023-09-07 RX ORDER — HYDROXYUREA 500 MG/1
CAPSULE ORAL
Qty: 64 CAPSULE | Refills: 1 | Status: SHIPPED | OUTPATIENT
Start: 2023-09-07

## 2023-09-07 NOTE — TELEPHONE ENCOUNTER
Rx Refill Note  Requested Prescriptions     Pending Prescriptions Disp Refills    hydroxyurea (HYDREA) 500 MG capsule 64 capsule 1     Sig: Take 3 tablets by mouth on Monday and Friday.  Rest of the week take 2 tablets p.o. daily      Last office visit with prescribing clinician: 8/11/2023   Last telemedicine visit with prescribing clinician: Visit date not found   Next office visit with prescribing clinician: 10/6/2023                         Would you like a call back once the refill request has been completed: [] Yes [] No    If the office needs to give you a call back, can they leave a voicemail: [] Yes [] No    Batsheva Andrade, [facility]  09/07/23, 07:50 CDT